# Patient Record
Sex: FEMALE | Race: WHITE | NOT HISPANIC OR LATINO
[De-identification: names, ages, dates, MRNs, and addresses within clinical notes are randomized per-mention and may not be internally consistent; named-entity substitution may affect disease eponyms.]

---

## 2018-02-02 PROBLEM — Z00.00 ENCOUNTER FOR PREVENTIVE HEALTH EXAMINATION: Status: ACTIVE | Noted: 2018-02-02

## 2018-02-16 ENCOUNTER — APPOINTMENT (OUTPATIENT)
Dept: ORTHOPEDIC SURGERY | Facility: CLINIC | Age: 78
End: 2018-02-16
Payer: MEDICARE

## 2018-02-16 VITALS
TEMPERATURE: 98.4 F | SYSTOLIC BLOOD PRESSURE: 156 MMHG | WEIGHT: 140 LBS | DIASTOLIC BLOOD PRESSURE: 77 MMHG | HEIGHT: 64 IN | BODY MASS INDEX: 23.9 KG/M2 | HEART RATE: 79 BPM

## 2018-02-16 DIAGNOSIS — M16.11 UNILATERAL PRIMARY OSTEOARTHRITIS, RIGHT HIP: ICD-10-CM

## 2018-02-16 DIAGNOSIS — Z85.3 PERSONAL HISTORY OF MALIGNANT NEOPLASM OF BREAST: ICD-10-CM

## 2018-02-16 DIAGNOSIS — Z78.9 OTHER SPECIFIED HEALTH STATUS: ICD-10-CM

## 2018-02-16 DIAGNOSIS — M87.051 IDIOPATHIC ASEPTIC NECROSIS OF RIGHT FEMUR: ICD-10-CM

## 2018-02-16 DIAGNOSIS — M25.551 PAIN IN RIGHT HIP: ICD-10-CM

## 2018-02-16 DIAGNOSIS — M16.10 UNILATERAL PRIMARY OSTEOARTHRITIS, UNSPECIFIED HIP: ICD-10-CM

## 2018-02-16 PROCEDURE — 99203 OFFICE O/P NEW LOW 30 MIN: CPT

## 2018-02-16 PROCEDURE — 73502 X-RAY EXAM HIP UNI 2-3 VIEWS: CPT | Mod: RT

## 2018-02-20 PROBLEM — M25.551 HIP PAIN, RIGHT: Status: ACTIVE | Noted: 2018-02-20

## 2018-02-20 PROBLEM — M16.11 PRIMARY OSTEOARTHRITIS OF RIGHT HIP: Status: ACTIVE | Noted: 2018-02-20

## 2018-02-20 PROBLEM — Z85.3 HISTORY OF MALIGNANT NEOPLASM OF FEMALE BREAST: Status: RESOLVED | Noted: 2018-02-16 | Resolved: 2018-02-20

## 2018-02-20 PROBLEM — M87.051 AVASCULAR NECROSIS OF BONE OF RIGHT HIP: Status: ACTIVE | Noted: 2018-02-20

## 2018-02-23 ENCOUNTER — OUTPATIENT (OUTPATIENT)
Dept: OUTPATIENT SERVICES | Facility: HOSPITAL | Age: 78
LOS: 1 days | End: 2018-02-23
Payer: COMMERCIAL

## 2018-02-23 VITALS
OXYGEN SATURATION: 99 % | RESPIRATION RATE: 16 BRPM | HEART RATE: 76 BPM | WEIGHT: 145.06 LBS | DIASTOLIC BLOOD PRESSURE: 78 MMHG | TEMPERATURE: 98 F | HEIGHT: 65 IN | SYSTOLIC BLOOD PRESSURE: 154 MMHG

## 2018-02-23 DIAGNOSIS — Z01.818 ENCOUNTER FOR OTHER PREPROCEDURAL EXAMINATION: ICD-10-CM

## 2018-02-23 DIAGNOSIS — M87.051 IDIOPATHIC ASEPTIC NECROSIS OF RIGHT FEMUR: ICD-10-CM

## 2018-02-23 DIAGNOSIS — M25.551 PAIN IN RIGHT HIP: ICD-10-CM

## 2018-02-23 DIAGNOSIS — Z98.49 CATARACT EXTRACTION STATUS, UNSPECIFIED EYE: Chronic | ICD-10-CM

## 2018-02-23 DIAGNOSIS — Z98.890 OTHER SPECIFIED POSTPROCEDURAL STATES: Chronic | ICD-10-CM

## 2018-02-23 DIAGNOSIS — M16.11 UNILATERAL PRIMARY OSTEOARTHRITIS, RIGHT HIP: ICD-10-CM

## 2018-02-23 LAB
ALBUMIN SERPL ELPH-MCNC: 4.5 G/DL — SIGNIFICANT CHANGE UP (ref 3.3–5)
ALP SERPL-CCNC: 55 U/L — SIGNIFICANT CHANGE UP (ref 30–120)
ALT FLD-CCNC: 29 U/L DA — SIGNIFICANT CHANGE UP (ref 10–60)
ANION GAP SERPL CALC-SCNC: 8 MMOL/L — SIGNIFICANT CHANGE UP (ref 5–17)
APTT BLD: 32.7 SEC — SIGNIFICANT CHANGE UP (ref 27.5–37.4)
AST SERPL-CCNC: 20 U/L — SIGNIFICANT CHANGE UP (ref 10–40)
BILIRUB SERPL-MCNC: 0.4 MG/DL — SIGNIFICANT CHANGE UP (ref 0.2–1.2)
BLD GP AB SCN SERPL QL: SIGNIFICANT CHANGE UP
BUN SERPL-MCNC: 24 MG/DL — HIGH (ref 7–23)
CALCIUM SERPL-MCNC: 11.1 MG/DL — HIGH (ref 8.4–10.5)
CHLORIDE SERPL-SCNC: 104 MMOL/L — SIGNIFICANT CHANGE UP (ref 96–108)
CO2 SERPL-SCNC: 26 MMOL/L — SIGNIFICANT CHANGE UP (ref 22–31)
CREAT SERPL-MCNC: 0.82 MG/DL — SIGNIFICANT CHANGE UP (ref 0.5–1.3)
GLUCOSE SERPL-MCNC: 111 MG/DL — HIGH (ref 70–99)
HCT VFR BLD CALC: 37.2 % — SIGNIFICANT CHANGE UP (ref 34.5–45)
HGB BLD-MCNC: 12.2 G/DL — SIGNIFICANT CHANGE UP (ref 11.5–15.5)
INR BLD: 0.99 RATIO — SIGNIFICANT CHANGE UP (ref 0.88–1.16)
MCHC RBC-ENTMCNC: 31.2 PG — SIGNIFICANT CHANGE UP (ref 27–34)
MCHC RBC-ENTMCNC: 32.7 GM/DL — SIGNIFICANT CHANGE UP (ref 32–36)
MCV RBC AUTO: 95.3 FL — SIGNIFICANT CHANGE UP (ref 80–100)
PLATELET # BLD AUTO: 425 K/UL — HIGH (ref 150–400)
POTASSIUM SERPL-MCNC: 4.5 MMOL/L — SIGNIFICANT CHANGE UP (ref 3.5–5.3)
POTASSIUM SERPL-SCNC: 4.5 MMOL/L — SIGNIFICANT CHANGE UP (ref 3.5–5.3)
PROT SERPL-MCNC: 8.4 G/DL — HIGH (ref 6–8.3)
PROTHROM AB SERPL-ACNC: 10.8 SEC — SIGNIFICANT CHANGE UP (ref 9.8–12.7)
RBC # BLD: 3.9 M/UL — SIGNIFICANT CHANGE UP (ref 3.8–5.2)
RBC # FLD: 12.4 % — SIGNIFICANT CHANGE UP (ref 10.3–14.5)
SODIUM SERPL-SCNC: 138 MMOL/L — SIGNIFICANT CHANGE UP (ref 135–145)
WBC # BLD: 9.5 K/UL — SIGNIFICANT CHANGE UP (ref 3.8–10.5)
WBC # FLD AUTO: 9.5 K/UL — SIGNIFICANT CHANGE UP (ref 3.8–10.5)

## 2018-02-23 PROCEDURE — 80053 COMPREHEN METABOLIC PANEL: CPT

## 2018-02-23 PROCEDURE — 86900 BLOOD TYPING SEROLOGIC ABO: CPT

## 2018-02-23 PROCEDURE — 36415 COLL VENOUS BLD VENIPUNCTURE: CPT

## 2018-02-23 PROCEDURE — G0463: CPT

## 2018-02-23 PROCEDURE — 87641 MR-STAPH DNA AMP PROBE: CPT

## 2018-02-23 PROCEDURE — 87640 STAPH A DNA AMP PROBE: CPT

## 2018-02-23 PROCEDURE — 85610 PROTHROMBIN TIME: CPT

## 2018-02-23 PROCEDURE — 86901 BLOOD TYPING SEROLOGIC RH(D): CPT

## 2018-02-23 PROCEDURE — 85730 THROMBOPLASTIN TIME PARTIAL: CPT

## 2018-02-23 PROCEDURE — 85027 COMPLETE CBC AUTOMATED: CPT

## 2018-02-23 PROCEDURE — 86850 RBC ANTIBODY SCREEN: CPT

## 2018-02-23 NOTE — H&P PST ADULT - PMH
Bipolar mood disorder    Breast cancer, left  treated with surgery and RT, no lymph node dissection  Esophageal reflux    Hip pain, acute, right    Hypercalcemia

## 2018-02-23 NOTE — H&P PST ADULT - HISTORY OF PRESENT ILLNESS
76 yo female presents with 5 month history of right hip pain.  Pain is constant and increases with any activity.  Ambulates with cane and is unable to ambulate without. 78 yo female presents with 5 month history of right hip pain.  Pain is constant and increases with any activity.  Ambulates with cane and is unable to ambulate without.   Pt states that Mobic and Tylenol offer mild relief.

## 2018-02-23 NOTE — H&P PST ADULT - ASSESSMENT
Pt presents to PST.  Instructions reviewed with patient and spouse.  Understanding shown by both.  Medical clearance apt is scheduled.

## 2018-02-23 NOTE — H&P PST ADULT - NSANTHOSAYNRD_GEN_A_CORE
No. GENOVEVA screening performed.  STOP BANG Legend: 0-2 = LOW Risk; 3-4 = INTERMEDIATE Risk; 5-8 = HIGH Risk

## 2018-02-24 LAB
MRSA PCR RESULT.: SIGNIFICANT CHANGE UP
S AUREUS DNA NOSE QL NAA+PROBE: SIGNIFICANT CHANGE UP

## 2018-03-01 RX ORDER — APREPITANT 80 MG/1
40 CAPSULE ORAL ONCE
Qty: 0 | Refills: 0 | Status: COMPLETED | OUTPATIENT
Start: 2018-03-07 | End: 2018-03-07

## 2018-03-01 RX ORDER — CHLORHEXIDINE GLUCONATE 213 G/1000ML
1 SOLUTION TOPICAL ONCE
Qty: 0 | Refills: 0 | Status: COMPLETED | OUTPATIENT
Start: 2018-03-07 | End: 2018-03-07

## 2018-03-01 RX ORDER — SODIUM CHLORIDE 9 MG/ML
1000 INJECTION, SOLUTION INTRAVENOUS
Qty: 0 | Refills: 0 | Status: DISCONTINUED | OUTPATIENT
Start: 2018-03-07 | End: 2018-03-09

## 2018-03-06 RX ORDER — DOCUSATE SODIUM 100 MG
100 CAPSULE ORAL THREE TIMES A DAY
Qty: 0 | Refills: 0 | Status: DISCONTINUED | OUTPATIENT
Start: 2018-03-07 | End: 2018-03-09

## 2018-03-06 RX ORDER — MAGNESIUM HYDROXIDE 400 MG/1
30 TABLET, CHEWABLE ORAL DAILY
Qty: 0 | Refills: 0 | Status: DISCONTINUED | OUTPATIENT
Start: 2018-03-07 | End: 2018-03-09

## 2018-03-06 RX ORDER — SENNA PLUS 8.6 MG/1
2 TABLET ORAL AT BEDTIME
Qty: 0 | Refills: 0 | Status: DISCONTINUED | OUTPATIENT
Start: 2018-03-07 | End: 2018-03-09

## 2018-03-06 RX ORDER — PANTOPRAZOLE SODIUM 20 MG/1
40 TABLET, DELAYED RELEASE ORAL DAILY
Qty: 0 | Refills: 0 | Status: DISCONTINUED | OUTPATIENT
Start: 2018-03-07 | End: 2018-03-09

## 2018-03-06 RX ORDER — ONDANSETRON 8 MG/1
4 TABLET, FILM COATED ORAL EVERY 6 HOURS
Qty: 0 | Refills: 0 | Status: DISCONTINUED | OUTPATIENT
Start: 2018-03-07 | End: 2018-03-09

## 2018-03-06 RX ORDER — POLYETHYLENE GLYCOL 3350 17 G/17G
17 POWDER, FOR SOLUTION ORAL DAILY
Qty: 0 | Refills: 0 | Status: DISCONTINUED | OUTPATIENT
Start: 2018-03-07 | End: 2018-03-09

## 2018-03-06 RX ORDER — SODIUM CHLORIDE 9 MG/ML
1000 INJECTION, SOLUTION INTRAVENOUS
Qty: 0 | Refills: 0 | Status: DISCONTINUED | OUTPATIENT
Start: 2018-03-07 | End: 2018-03-09

## 2018-03-07 ENCOUNTER — RESULT REVIEW (OUTPATIENT)
Age: 78
End: 2018-03-07

## 2018-03-07 ENCOUNTER — INPATIENT (INPATIENT)
Facility: HOSPITAL | Age: 78
LOS: 1 days | Discharge: NURSING, REHAB OR LONG-TERM CARE | DRG: 470 | End: 2018-03-09
Attending: ORTHOPAEDIC SURGERY | Admitting: ORTHOPAEDIC SURGERY
Payer: COMMERCIAL

## 2018-03-07 ENCOUNTER — TRANSCRIPTION ENCOUNTER (OUTPATIENT)
Age: 78
End: 2018-03-07

## 2018-03-07 ENCOUNTER — APPOINTMENT (OUTPATIENT)
Dept: ORTHOPEDIC SURGERY | Facility: HOSPITAL | Age: 78
End: 2018-03-07

## 2018-03-07 VITALS
HEART RATE: 75 BPM | DIASTOLIC BLOOD PRESSURE: 74 MMHG | HEIGHT: 65 IN | RESPIRATION RATE: 18 BRPM | TEMPERATURE: 99 F | SYSTOLIC BLOOD PRESSURE: 134 MMHG | OXYGEN SATURATION: 100 % | WEIGHT: 128.75 LBS

## 2018-03-07 DIAGNOSIS — M87.051 IDIOPATHIC ASEPTIC NECROSIS OF RIGHT FEMUR: ICD-10-CM

## 2018-03-07 DIAGNOSIS — Z98.49 CATARACT EXTRACTION STATUS, UNSPECIFIED EYE: Chronic | ICD-10-CM

## 2018-03-07 DIAGNOSIS — M16.11 UNILATERAL PRIMARY OSTEOARTHRITIS, RIGHT HIP: ICD-10-CM

## 2018-03-07 DIAGNOSIS — Z98.890 OTHER SPECIFIED POSTPROCEDURAL STATES: Chronic | ICD-10-CM

## 2018-03-07 DIAGNOSIS — M25.551 PAIN IN RIGHT HIP: ICD-10-CM

## 2018-03-07 DIAGNOSIS — Z01.818 ENCOUNTER FOR OTHER PREPROCEDURAL EXAMINATION: ICD-10-CM

## 2018-03-07 LAB
ANION GAP SERPL CALC-SCNC: 6 MMOL/L — SIGNIFICANT CHANGE UP (ref 5–17)
BUN SERPL-MCNC: 16 MG/DL — SIGNIFICANT CHANGE UP (ref 7–23)
CALCIUM SERPL-MCNC: 9.7 MG/DL — SIGNIFICANT CHANGE UP (ref 8.4–10.5)
CHLORIDE SERPL-SCNC: 107 MMOL/L — SIGNIFICANT CHANGE UP (ref 96–108)
CO2 SERPL-SCNC: 26 MMOL/L — SIGNIFICANT CHANGE UP (ref 22–31)
CREAT SERPL-MCNC: 0.78 MG/DL — SIGNIFICANT CHANGE UP (ref 0.5–1.3)
GLUCOSE SERPL-MCNC: 197 MG/DL — HIGH (ref 70–99)
HCT VFR BLD CALC: 28.2 % — LOW (ref 34.5–45)
HGB BLD-MCNC: 9 G/DL — LOW (ref 11.5–15.5)
MCHC RBC-ENTMCNC: 31.9 PG — SIGNIFICANT CHANGE UP (ref 27–34)
MCHC RBC-ENTMCNC: 32 GM/DL — SIGNIFICANT CHANGE UP (ref 32–36)
MCV RBC AUTO: 99.8 FL — SIGNIFICANT CHANGE UP (ref 80–100)
PLATELET # BLD AUTO: 326 K/UL — SIGNIFICANT CHANGE UP (ref 150–400)
POTASSIUM SERPL-MCNC: 4 MMOL/L — SIGNIFICANT CHANGE UP (ref 3.5–5.3)
POTASSIUM SERPL-SCNC: 4 MMOL/L — SIGNIFICANT CHANGE UP (ref 3.5–5.3)
RBC # BLD: 2.82 M/UL — LOW (ref 3.8–5.2)
RBC # FLD: 12.3 % — SIGNIFICANT CHANGE UP (ref 10.3–14.5)
SODIUM SERPL-SCNC: 139 MMOL/L — SIGNIFICANT CHANGE UP (ref 135–145)
WBC # BLD: 13.4 K/UL — HIGH (ref 3.8–10.5)
WBC # FLD AUTO: 13.4 K/UL — HIGH (ref 3.8–10.5)

## 2018-03-07 PROCEDURE — 88305 TISSUE EXAM BY PATHOLOGIST: CPT | Mod: 26

## 2018-03-07 PROCEDURE — 88311 DECALCIFY TISSUE: CPT | Mod: 26

## 2018-03-07 PROCEDURE — 99223 1ST HOSP IP/OBS HIGH 75: CPT

## 2018-03-07 PROCEDURE — 27130 TOTAL HIP ARTHROPLASTY: CPT | Mod: RT

## 2018-03-07 RX ORDER — HYDROMORPHONE HYDROCHLORIDE 2 MG/ML
0.5 INJECTION INTRAMUSCULAR; INTRAVENOUS; SUBCUTANEOUS
Qty: 0 | Refills: 0 | Status: DISCONTINUED | OUTPATIENT
Start: 2018-03-07 | End: 2018-03-07

## 2018-03-07 RX ORDER — OMEPRAZOLE 10 MG/1
1 CAPSULE, DELAYED RELEASE ORAL
Qty: 0 | Refills: 0 | COMMUNITY

## 2018-03-07 RX ORDER — DOCUSATE SODIUM 100 MG
1 CAPSULE ORAL
Qty: 0 | Refills: 0 | DISCHARGE
Start: 2018-03-07

## 2018-03-07 RX ORDER — POLYETHYLENE GLYCOL 3350 17 G/17G
17 POWDER, FOR SOLUTION ORAL
Qty: 0 | Refills: 0 | DISCHARGE
Start: 2018-03-07

## 2018-03-07 RX ORDER — OXYCODONE HYDROCHLORIDE 5 MG/1
5 TABLET ORAL
Qty: 0 | Refills: 0 | Status: DISCONTINUED | OUTPATIENT
Start: 2018-03-07 | End: 2018-03-09

## 2018-03-07 RX ORDER — ASPIRIN/CALCIUM CARB/MAGNESIUM 324 MG
2 TABLET ORAL
Qty: 0 | Refills: 0 | COMMUNITY
Start: 2018-03-07

## 2018-03-07 RX ORDER — SENNA PLUS 8.6 MG/1
2 TABLET ORAL
Qty: 0 | Refills: 0 | DISCHARGE
Start: 2018-03-07

## 2018-03-07 RX ORDER — PANTOPRAZOLE SODIUM 20 MG/1
1 TABLET, DELAYED RELEASE ORAL
Qty: 0 | Refills: 0 | DISCHARGE
Start: 2018-03-07

## 2018-03-07 RX ORDER — ACETAMINOPHEN 500 MG
2 TABLET ORAL
Qty: 0 | Refills: 0 | DISCHARGE
Start: 2018-03-07

## 2018-03-07 RX ORDER — ACETAMINOPHEN 500 MG
1000 TABLET ORAL EVERY 8 HOURS
Qty: 0 | Refills: 0 | Status: DISCONTINUED | OUTPATIENT
Start: 2018-03-08 | End: 2018-03-09

## 2018-03-07 RX ORDER — MORPHINE SULFATE 50 MG/1
5 CAPSULE, EXTENDED RELEASE ORAL EVERY 4 HOURS
Qty: 0 | Refills: 0 | Status: DISCONTINUED | OUTPATIENT
Start: 2018-03-07 | End: 2018-03-09

## 2018-03-07 RX ORDER — OXYCODONE HYDROCHLORIDE 5 MG/1
10 TABLET ORAL
Qty: 0 | Refills: 0 | Status: DISCONTINUED | OUTPATIENT
Start: 2018-03-07 | End: 2018-03-09

## 2018-03-07 RX ORDER — CELECOXIB 200 MG/1
1 CAPSULE ORAL
Qty: 0 | Refills: 0 | DISCHARGE
Start: 2018-03-07

## 2018-03-07 RX ORDER — ASPIRIN/CALCIUM CARB/MAGNESIUM 324 MG
162 TABLET ORAL
Qty: 0 | Refills: 0 | Status: DISCONTINUED | OUTPATIENT
Start: 2018-03-08 | End: 2018-03-09

## 2018-03-07 RX ORDER — ASPIRIN/CALCIUM CARB/MAGNESIUM 324 MG
2 TABLET ORAL
Qty: 0 | Refills: 0 | DISCHARGE
Start: 2018-03-07

## 2018-03-07 RX ORDER — CELECOXIB 200 MG/1
1 CAPSULE ORAL
Qty: 0 | Refills: 0 | COMMUNITY
Start: 2018-03-07

## 2018-03-07 RX ORDER — SODIUM CHLORIDE 9 MG/ML
1000 INJECTION, SOLUTION INTRAVENOUS
Qty: 0 | Refills: 0 | Status: DISCONTINUED | OUTPATIENT
Start: 2018-03-07 | End: 2018-03-07

## 2018-03-07 RX ORDER — TRANEXAMIC ACID 100 MG/ML
1000 INJECTION, SOLUTION INTRAVENOUS ONCE
Qty: 0 | Refills: 0 | Status: COMPLETED | OUTPATIENT
Start: 2018-03-07 | End: 2018-03-07

## 2018-03-07 RX ORDER — ACETAMINOPHEN 500 MG
2 TABLET ORAL
Qty: 0 | Refills: 0 | COMMUNITY

## 2018-03-07 RX ORDER — MELOXICAM 15 MG/1
1 TABLET ORAL
Qty: 0 | Refills: 0 | COMMUNITY

## 2018-03-07 RX ORDER — ACETAMINOPHEN 500 MG
1000 TABLET ORAL ONCE
Qty: 0 | Refills: 0 | Status: COMPLETED | OUTPATIENT
Start: 2018-03-07 | End: 2018-03-07

## 2018-03-07 RX ORDER — ACETAMINOPHEN 500 MG
1000 TABLET ORAL EVERY 6 HOURS
Qty: 0 | Refills: 0 | Status: COMPLETED | OUTPATIENT
Start: 2018-03-07 | End: 2018-03-08

## 2018-03-07 RX ORDER — CEFAZOLIN SODIUM 1 G
2000 VIAL (EA) INJECTION ONCE
Qty: 0 | Refills: 0 | Status: COMPLETED | OUTPATIENT
Start: 2018-03-07 | End: 2018-03-07

## 2018-03-07 RX ORDER — CINACALCET 30 MG/1
30 TABLET, FILM COATED ORAL
Qty: 0 | Refills: 0 | Status: DISCONTINUED | OUTPATIENT
Start: 2018-03-07 | End: 2018-03-09

## 2018-03-07 RX ORDER — LITHIUM CARBONATE 300 MG/1
300 TABLET, EXTENDED RELEASE ORAL DAILY
Qty: 0 | Refills: 0 | Status: DISCONTINUED | OUTPATIENT
Start: 2018-03-07 | End: 2018-03-09

## 2018-03-07 RX ORDER — CELECOXIB 200 MG/1
200 CAPSULE ORAL
Qty: 0 | Refills: 0 | Status: DISCONTINUED | OUTPATIENT
Start: 2018-03-07 | End: 2018-03-09

## 2018-03-07 RX ORDER — CEFAZOLIN SODIUM 1 G
2000 VIAL (EA) INJECTION EVERY 8 HOURS
Qty: 0 | Refills: 0 | Status: COMPLETED | OUTPATIENT
Start: 2018-03-07 | End: 2018-03-08

## 2018-03-07 RX ADMIN — HYDROMORPHONE HYDROCHLORIDE 0.5 MILLIGRAM(S): 2 INJECTION INTRAMUSCULAR; INTRAVENOUS; SUBCUTANEOUS at 12:54

## 2018-03-07 RX ADMIN — Medication 400 MILLIGRAM(S): at 17:27

## 2018-03-07 RX ADMIN — Medication 100 MILLIGRAM(S): at 17:28

## 2018-03-07 RX ADMIN — Medication 1000 MILLIGRAM(S): at 21:44

## 2018-03-07 RX ADMIN — CELECOXIB 200 MILLIGRAM(S): 200 CAPSULE ORAL at 17:31

## 2018-03-07 RX ADMIN — SODIUM CHLORIDE 75 MILLILITER(S): 9 INJECTION, SOLUTION INTRAVENOUS at 11:49

## 2018-03-07 RX ADMIN — SODIUM CHLORIDE 75 MILLILITER(S): 9 INJECTION, SOLUTION INTRAVENOUS at 11:51

## 2018-03-07 RX ADMIN — Medication 400 MILLIGRAM(S): at 21:13

## 2018-03-07 RX ADMIN — APREPITANT 40 MILLIGRAM(S): 80 CAPSULE ORAL at 07:36

## 2018-03-07 RX ADMIN — HYDROMORPHONE HYDROCHLORIDE 0.5 MILLIGRAM(S): 2 INJECTION INTRAMUSCULAR; INTRAVENOUS; SUBCUTANEOUS at 12:24

## 2018-03-07 RX ADMIN — CHLORHEXIDINE GLUCONATE 1 APPLICATION(S): 213 SOLUTION TOPICAL at 07:36

## 2018-03-07 NOTE — DISCHARGE NOTE ADULT - PATIENT PORTAL LINK FT
You can access the [facility] Patient Portal, offered by [facility], by registering with the following website: [email or web address]

## 2018-03-07 NOTE — DISCHARGE NOTE ADULT - PROVIDER TOKENS
FREE:[LAST:[Ed],FIRST:[Ciaran],PHONE:[phone]],FAX:[phone]   [phone]    -],ADDRESS:[[address] [place] [address]]]

## 2018-03-07 NOTE — CONSULT NOTE ADULT - ASSESSMENT
76 y/o female PMH of left breast cancer, GERD, Bipolar disorder, who  presented  with 5 month history of right hip pain, s/p Right total hip replacement surgery.     - S/p Right Total Hips replacement Surgery: POD # 0. Pain management per anesthesia, ortho. PT/ OT once cleared by ortho. Incentive spirometry. DVT PPX. Stool softeners while on narcotics.  - Bipolar disorder: Continue home lithium dose and frequency.  - GERD: Protnix daily  - GI/ DVt PPX  - CBC, BMP for am.   - Medically stable.

## 2018-03-07 NOTE — DISCHARGE NOTE ADULT - CARE PLAN
Principal Discharge DX:	Primary osteoarthritis of right hip  Goal:	Improve ambulation, ADLs and quality of life  Assessment and plan of treatment:	PT/OT Total Hip Protocol; Ambulation, transfers, stairs, & ADLs  Full weight bearing both legs; Walker/cane use as instructed by PT/OT  Anterior THR precautions for 4 weeks: No straight leg raise; No external rotation of hip when extended-standing or lying flat; No hyperextension of hip when standing (kickback)  Ice packs to hip  Prineo tape removal on/near after Post Op Day # 14 in rehab facility / Surgeon's office.  Heterotopic Bone Protocol post THR: Celebrex 200mg bid for 21 days; If stopped mid course for intolerance, contact Surgeon & Antonio BULL to notify.  Instruct patient to see PCP in office near 2-3 weeks from discharge from rehab for exam/labwork.  DVT: prophylaxis - Aspirin 162 mg 2 times daily for a total of 6 weeks.  - Call your doctor if you experience:  • An increase in pain not controlled by pain medication or change in activity or position.  • Temperature greater than 101° F.  • Redness, increased swelling or foul smelling drainage from or around the incision.  • Numbness, tingling or a change in color or temperature of the operative leg.  • Call your doctor immediately if you experience chest pain, shortness of breath or calf pain.

## 2018-03-07 NOTE — DISCHARGE NOTE ADULT - MEDICATION SUMMARY - MEDICATIONS TO TAKE
I will START or STAY ON the medications listed below when I get home from the hospital:    acetaminophen 500 mg oral tablet  -- 2 tab(s) by mouth every 8 hours  -- Indication: For mild pain    celecoxib 200 mg oral capsule  -- 1 cap(s) by mouth 2 times a day (with meals) for 21 days post-operatively for HO prophylaxis stop on 3/28  -- Indication: For Prevention of bony overgrowth    aspirin 81 mg oral delayed release tablet  -- 2 tab(s) by mouth for 42 days total post-operatively stop on 4/17  -- Indication: For Prevent blood clots    oxyCODONE 5 mg oral tablet  -- 1 tab(s) by mouth every 3 hours, As needed, moderate pain  -- Indication: For moderate pain    lithium 300 mg oral tablet  -- 1 tab(s) by mouth once a day  -- Indication: For mood    Sensipar 30 mg oral tablet  -- 1 tab(s) by mouth 2 times a day  -- Indication: For hormone    ferrous sulfate 325 mg (65 mg elemental iron) oral tablet  -- 1 tab(s) by mouth 2 times a day  -- Indication: For supplement    senna oral tablet  -- 2 tab(s) by mouth once a day (at bedtime)  -- Indication: For constipation as needed    docusate sodium 100 mg oral capsule  -- 1 cap(s) by mouth 3 times a day  -- Indication: For constipation as needed    polyethylene glycol 3350 oral powder for reconstitution  -- 17 gram(s) by mouth once a day, As needed, Constipation  -- Indication: For constipation as needed    pantoprazole 40 mg oral delayed release tablet  -- 1 tab(s) by mouth once a day  -- Indication: For Prevent ulcers I will START or STAY ON the medications listed below when I get home from the hospital:    acetaminophen 500 mg oral tablet  -- 2 tab(s) by mouth every 8 hours  -- Indication: For mild pain    celecoxib 200 mg oral capsule  -- 1 cap(s) by mouth 2 times a day (with meals) for 21 days post-operatively for HO prophylaxis stop on 3/28  -- Indication: For Prevention of bony overgrowth    aspirin 81 mg oral delayed release tablet  -- 2 tab(s) by mouth for 42 days total post-operatively stop on 4/17  -- Indication: For Prevent blood clots    oxyCODONE 5 mg oral tablet  -- 1 tab(s) by mouth every 4 hours, As Needed for mild hip pain. 2 tabs for severe pain  -- Indication: For Surgical pain    lithium 300 mg oral tablet  -- 1 tab(s) by mouth once a day  -- Indication: For mood    Sensipar 30 mg oral tablet  -- 1 tab(s) by mouth 2 times a day  -- Indication: For hormone    ferrous sulfate 325 mg (65 mg elemental iron) oral tablet  -- 1 tab(s) by mouth 2 times a day  -- Indication: For supplement    senna oral tablet  -- 2 tab(s) by mouth once a day (at bedtime)  -- Indication: For constipation as needed    docusate sodium 100 mg oral capsule  -- 1 cap(s) by mouth 3 times a day  -- Indication: For constipation as needed    polyethylene glycol 3350 oral powder for reconstitution  -- 17 gram(s) by mouth once a day, As needed, Constipation  -- Indication: For constipation as needed    pantoprazole 40 mg oral delayed release tablet  -- 1 tab(s) by mouth once a day  -- Indication: For Prevent ulcers

## 2018-03-07 NOTE — OCCUPATIONAL THERAPY INITIAL EVALUATION ADULT - PRECAUTIONS/LIMITATIONS, REHAB EVAL
No hyperextension, no extreme external rotation, no extreme abduction of operated LE./fall precautions/surgical precautions

## 2018-03-07 NOTE — OCCUPATIONAL THERAPY INITIAL EVALUATION ADULT - ADDITIONAL COMMENTS
Pt lives in  a house with 3 ALISHA + railing + stairlift to bedroom/bathroom +tub with shower chair. PTA pt used a SAC

## 2018-03-07 NOTE — CONSULT NOTE ADULT - SUBJECTIVE AND OBJECTIVE BOX
Patient is a 77y old  Female who presents with a chief complaint of " I have pain in my right hip." (06 Mar 2018 15:50)       INTERVAL HPI/ OVERNIGHT EVENTS:78 y/o female PMH of left breast cancer, GERD, Bipolar disorder, who  presented  with 5 month history of right hip pain.  Pain has been  constant and increases with any activity.  Ambulates with cane and is unable to ambulate without.   Pt states that Mobic and Tylenol offer mild relief. 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 Seen and examined Post operatively. Denies any symptoms, but wants to take her lithium.     PMH: As per HPI  PSH: Cataract removal surgery, Lumpectomy   FH: No significant family  history.  SH: Denies Tobacco, alcohol, drug use.          MEDICATIONS  (STANDING):  ceFAZolin   IVPB 2000 milliGRAM(s) IV Intermittent every 8 hours  celecoxib 200 milliGRAM(s) Oral two times a day with meals  cinacalcet 30 milliGRAM(s) Oral two times a day  lactated ringers. 1000 milliLiter(s) (75 mL/Hr) IV Continuous <Continuous>  lactated ringers. 1000 milliLiter(s) (75 mL/Hr) IV Continuous <Continuous>  lithium 300 milliGRAM(s) Oral daily    MEDICATIONS  (PRN):  HYDROmorphone  Injectable 0.5 milliGRAM(s) IV Push every 10 minutes PRN Moderate Pain  morphine  - Injectable 5 milliGRAM(s) IV Push every 4 hours PRN Severe Pain (7 - 10)  oxyCODONE    IR 5 milliGRAM(s) Oral every 3 hours PRN Mild Pain (1 - 3)  oxyCODONE    IR 10 milliGRAM(s) Oral every 3 hours PRN Moderate Pain (4 - 6)  promethazine IVPB 6.25 milliGRAM(s) IV Intermittent once PRN Nausea and/or Vomiting      Allergies    No Known Allergies    Intolerances        REVIEW OF SYSTEMS:  CONSTITUTIONAL: No fever, weight loss, or fatigue  EYES: No eye pain, visual disturbances, or discharge  ENMT:  No difficulty hearing, tinnitus, vertigo; No sinus or throat pain  NECK: No pain or stiffness  BREASTS: No pain, masses, or nipple discharge  RESPIRATORY: No cough, wheezing, chills or hemoptysis; No shortness of breath  CARDIOVASCULAR: No chest pain, palpitations, dizziness, or leg swelling  GASTROINTESTINAL: No abdominal or epigastric pain. No nausea, vomiting, or hematemesis; No diarrhea or constipation. No melena or hematochezia.  GENITOURINARY: No dysuria, frequency, hematuria, or incontinence  NEUROLOGICAL: No headaches, memory loss, loss of strength, numbness, or tremors  SKIN: No itching, burning, rashes, or lesions   LYMPH NODES: No enlarged glands  ENDOCRINE: No heat or cold intolerance; No hair loss; No polydipsia or polyuria  MUSCULOSKELETAL: No joint pain or swelling; No muscle, back, or extremity pain  PSYCHIATRIC: No depression, anxiety, mood swings, or difficulty sleeping  HEME/LYMPH: No easy bruising, or bleeding gums  ALLERGY AND IMMUNOLOGIC: No hives or eczema    Vital Signs Last 24 Hrs  T(C): 36.6 (07 Mar 2018 11:26), Max: 37.1 (07 Mar 2018 06:51)  T(F): 97.8 (07 Mar 2018 11:26), Max: 98.7 (07 Mar 2018 06:51)  HR: 76 (07 Mar 2018 11:41) (75 - 78)  BP: 100/64 (07 Mar 2018 11:41) (100/64 - 134/74)  BP(mean): --  RR: 19 (07 Mar 2018 11:41) (14 - 22)  SpO2: 100% (07 Mar 2018 11:41) (100% - 100%)    PHYSICAL EXAM:  GENERAL: NAD, well-groomed, well-developed  HEAD:  Atraumatic, Normocephalic  EYES: EOMI, PERRLA, conjunctiva and sclera clear  ENMT: No tonsillar erythema, exudates, or enlargement; Moist mucous membranes, Good dentition, No lesions  NECK: Supple, No JVD, Normal thyroid  NERVOUS SYSTEM:  Alert & Oriented X3, Good concentration; Motor Strength 5/5 B/L upper and lower extremities; DTRs 2+ intact and symmetric  CHEST/LUNG: Clear to auscultation bilaterally; No rales, rhonchi, wheezing, or rubs  HEART: Regular rate and rhythm; No murmurs, rubs, or gallops  ABDOMEN: Soft, Nontender, Nondistended; Bowel sounds present  EXTREMITIES:  2+ Peripheral Pulses, No clubbing, cyanosis, or edema  LYMPH: No lymphadenopathy noted  SKIN: No rashes or lesions    LABS:            CAPILLARY BLOOD GLUCOSE        BLOOD CULTURE    RADIOLOGY & ADDITIONAL TESTS:    Imaging Personally Reviewed:  [ ] YES     Consultant(s) Notes Reviewed:      Care Discussed with Consultants/Other Providers:

## 2018-03-07 NOTE — DISCHARGE NOTE ADULT - HOSPITAL COURSE
This patient was admitted to [facility] with a history of severe degenerative joint disease of the right hip.  Patient went to Pre-Surgical Testing at [facility] and was medically cleared to undergo elective procedure. Patient underwent right Ant THR by Dr. Ciaran Ward on 3/7/18. Procedure was well tolerated.  No operative or stan-operative complications arose during patients hospital course.  Patient received antibiotic according to SCIP guidelines for infection prevention.  Aspirin was given for DVT prophylaxis.  Anesthesia, Medical Hospitalist, Physical Therapy and Occupational Therapy were consulted. Patient is stable for discharge with a good prognosis.  Appropriate discharge instructions and medications are provided in this document.

## 2018-03-07 NOTE — DISCHARGE NOTE ADULT - MEDICATION SUMMARY - MEDICATIONS TO STOP TAKING
I will STOP taking the medications listed below when I get home from the hospital:    omeprazole 20 mg oral delayed release tablet  -- 1 tab(s) by mouth 2 times a day    Tylenol 325 mg oral tablet  -- 2 tab(s) by mouth prn

## 2018-03-07 NOTE — DISCHARGE NOTE ADULT - PLAN OF CARE
PT/OT Total Hip Protocol; Ambulation, transfers, stairs, & ADLs  Full weight bearing both legs; Walker/cane use as instructed by PT/OT  Anterior THR precautions for 4 weeks: No straight leg raise; No external rotation of hip when extended-standing or lying flat; No hyperextension of hip when standing (kickback)  Ice packs to hip  Prineo tape removal on/near after Post Op Day # 14 in rehab facility / Surgeon's office.  Heterotopic Bone Protocol post THR: Celebrex 200mg bid for 21 days; If stopped mid course for intolerance, contact Surgeon & House MD to notify.  Instruct patient to see PCP in office near 2-3 weeks from discharge from rehab for exam/labwork.  DVT: prophylaxis - Aspirin 162 mg 2 times daily for a total of 6 weeks.  - Call your doctor if you experience:  • An increase in pain not controlled by pain medication or change in activity or position.  • Temperature greater than 101° F.  • Redness, increased swelling or foul smelling drainage from or around the incision.  • Numbness, tingling or a change in color or temperature of the operative leg.  • Call your doctor immediately if you experience chest pain, shortness of breath or calf pain. Improve ambulation, ADLs and quality of life

## 2018-03-07 NOTE — OCCUPATIONAL THERAPY INITIAL EVALUATION ADULT - NS ASR FOLLOW COMMAND OT EVAL
No hyperextension, no extreme external rotation, no extreme abduction of operated LE./100% of the time

## 2018-03-07 NOTE — PHYSICAL THERAPY INITIAL EVALUATION ADULT - ACTIVE RANGE OF MOTION EXAMINATION, REHAB EVAL
antolin. upper extremity Active ROM was WNL (within normal limits)/bilateral lower extremity Active ROM was WNL (within normal limits)

## 2018-03-07 NOTE — PHYSICAL THERAPY INITIAL EVALUATION ADULT - ADDITIONAL COMMENTS
Pt lives with  in a house with 4 ALISHA, +HRs. There is a stairlift present to get to and from second floor. Pt reports using a standard cane prior to surgery and has a rolling walker as well.

## 2018-03-08 LAB
ANION GAP SERPL CALC-SCNC: 6 MMOL/L — SIGNIFICANT CHANGE UP (ref 5–17)
BUN SERPL-MCNC: 11 MG/DL — SIGNIFICANT CHANGE UP (ref 7–23)
CALCIUM SERPL-MCNC: 9.6 MG/DL — SIGNIFICANT CHANGE UP (ref 8.4–10.5)
CHLORIDE SERPL-SCNC: 107 MMOL/L — SIGNIFICANT CHANGE UP (ref 96–108)
CO2 SERPL-SCNC: 29 MMOL/L — SIGNIFICANT CHANGE UP (ref 22–31)
CREAT SERPL-MCNC: 0.64 MG/DL — SIGNIFICANT CHANGE UP (ref 0.5–1.3)
GLUCOSE SERPL-MCNC: 98 MG/DL — SIGNIFICANT CHANGE UP (ref 70–99)
HBA1C BLD-MCNC: 5 % — SIGNIFICANT CHANGE UP (ref 4–5.6)
HCT VFR BLD CALC: 30.2 % — LOW (ref 34.5–45)
HGB BLD-MCNC: 9.9 G/DL — LOW (ref 11.5–15.5)
MCHC RBC-ENTMCNC: 32.6 PG — SIGNIFICANT CHANGE UP (ref 27–34)
MCHC RBC-ENTMCNC: 32.8 GM/DL — SIGNIFICANT CHANGE UP (ref 32–36)
MCV RBC AUTO: 99.4 FL — SIGNIFICANT CHANGE UP (ref 80–100)
PLATELET # BLD AUTO: 309 K/UL — SIGNIFICANT CHANGE UP (ref 150–400)
POTASSIUM SERPL-MCNC: 4.3 MMOL/L — SIGNIFICANT CHANGE UP (ref 3.5–5.3)
POTASSIUM SERPL-SCNC: 4.3 MMOL/L — SIGNIFICANT CHANGE UP (ref 3.5–5.3)
RBC # BLD: 3.03 M/UL — LOW (ref 3.8–5.2)
RBC # FLD: 12.3 % — SIGNIFICANT CHANGE UP (ref 10.3–14.5)
SODIUM SERPL-SCNC: 142 MMOL/L — SIGNIFICANT CHANGE UP (ref 135–145)
WBC # BLD: 10.1 K/UL — SIGNIFICANT CHANGE UP (ref 3.8–10.5)
WBC # FLD AUTO: 10.1 K/UL — SIGNIFICANT CHANGE UP (ref 3.8–10.5)

## 2018-03-08 PROCEDURE — 99233 SBSQ HOSP IP/OBS HIGH 50: CPT

## 2018-03-08 RX ADMIN — Medication 162 MILLIGRAM(S): at 21:35

## 2018-03-08 RX ADMIN — CELECOXIB 200 MILLIGRAM(S): 200 CAPSULE ORAL at 17:11

## 2018-03-08 RX ADMIN — PANTOPRAZOLE SODIUM 40 MILLIGRAM(S): 20 TABLET, DELAYED RELEASE ORAL at 12:50

## 2018-03-08 RX ADMIN — Medication 162 MILLIGRAM(S): at 10:04

## 2018-03-08 RX ADMIN — CINACALCET 30 MILLIGRAM(S): 30 TABLET, FILM COATED ORAL at 08:21

## 2018-03-08 RX ADMIN — Medication 1000 MILLIGRAM(S): at 04:43

## 2018-03-08 RX ADMIN — Medication 1000 MILLIGRAM(S): at 17:11

## 2018-03-08 RX ADMIN — CELECOXIB 200 MILLIGRAM(S): 200 CAPSULE ORAL at 08:21

## 2018-03-08 RX ADMIN — Medication 100 MILLIGRAM(S): at 01:30

## 2018-03-08 RX ADMIN — LITHIUM CARBONATE 300 MILLIGRAM(S): 300 TABLET, EXTENDED RELEASE ORAL at 08:21

## 2018-03-08 RX ADMIN — Medication 100 MILLIGRAM(S): at 12:50

## 2018-03-08 RX ADMIN — CELECOXIB 200 MILLIGRAM(S): 200 CAPSULE ORAL at 09:00

## 2018-03-08 RX ADMIN — Medication 1000 MILLIGRAM(S): at 10:04

## 2018-03-08 RX ADMIN — Medication 400 MILLIGRAM(S): at 04:24

## 2018-03-08 RX ADMIN — Medication 1000 MILLIGRAM(S): at 11:00

## 2018-03-08 RX ADMIN — CINACALCET 30 MILLIGRAM(S): 30 TABLET, FILM COATED ORAL at 17:11

## 2018-03-08 NOTE — PROGRESS NOTE ADULT - ASSESSMENT
78 y/o female PMH of left breast cancer, GERD, Bipolar disorder, who  presented  with 5 month history of right hip pain, s/p Right total hip replacement surgery.     - S/p Right Total Hips replacement Surgery: POD # 1. Pain management per anesthesia, ortho. PT/ OT once cleared by ortho. Incentive spirometry. DVT PPX. Stool softeners while on narcotics.  - Leukocytosis, likely reactive. Monitor counts for now. Afebrile, asymptomatic.  - Hyperglycemia:  No h/o DM. Check HbA1c.   - Bipolar disorder: Continue home lithium dose and frequency.  - GERD: Protonix daily  - GI/ DVt PPX: Protonix , Aspirin   - CBC, BMP for am.   - Medically stable.

## 2018-03-09 VITALS
OXYGEN SATURATION: 98 % | TEMPERATURE: 98 F | RESPIRATION RATE: 16 BRPM | DIASTOLIC BLOOD PRESSURE: 62 MMHG | HEART RATE: 72 BPM | SYSTOLIC BLOOD PRESSURE: 98 MMHG

## 2018-03-09 LAB
ANION GAP SERPL CALC-SCNC: 6 MMOL/L — SIGNIFICANT CHANGE UP (ref 5–17)
BUN SERPL-MCNC: 15 MG/DL — SIGNIFICANT CHANGE UP (ref 7–23)
CALCIUM SERPL-MCNC: 9.6 MG/DL — SIGNIFICANT CHANGE UP (ref 8.4–10.5)
CHLORIDE SERPL-SCNC: 109 MMOL/L — HIGH (ref 96–108)
CO2 SERPL-SCNC: 26 MMOL/L — SIGNIFICANT CHANGE UP (ref 22–31)
CREAT SERPL-MCNC: 0.64 MG/DL — SIGNIFICANT CHANGE UP (ref 0.5–1.3)
GLUCOSE SERPL-MCNC: 101 MG/DL — HIGH (ref 70–99)
HCT VFR BLD CALC: 27.9 % — LOW (ref 34.5–45)
HGB BLD-MCNC: 9.1 G/DL — LOW (ref 11.5–15.5)
MCHC RBC-ENTMCNC: 32.5 GM/DL — SIGNIFICANT CHANGE UP (ref 32–36)
MCHC RBC-ENTMCNC: 32.5 PG — SIGNIFICANT CHANGE UP (ref 27–34)
MCV RBC AUTO: 100.1 FL — HIGH (ref 80–100)
PLATELET # BLD AUTO: 299 K/UL — SIGNIFICANT CHANGE UP (ref 150–400)
POTASSIUM SERPL-MCNC: 4.3 MMOL/L — SIGNIFICANT CHANGE UP (ref 3.5–5.3)
POTASSIUM SERPL-SCNC: 4.3 MMOL/L — SIGNIFICANT CHANGE UP (ref 3.5–5.3)
RBC # BLD: 2.79 M/UL — LOW (ref 3.8–5.2)
RBC # FLD: 12.8 % — SIGNIFICANT CHANGE UP (ref 10.3–14.5)
SODIUM SERPL-SCNC: 141 MMOL/L — SIGNIFICANT CHANGE UP (ref 135–145)
WBC # BLD: 10.9 K/UL — HIGH (ref 3.8–10.5)
WBC # FLD AUTO: 10.9 K/UL — HIGH (ref 3.8–10.5)

## 2018-03-09 PROCEDURE — 94664 DEMO&/EVAL PT USE INHALER: CPT

## 2018-03-09 PROCEDURE — 97530 THERAPEUTIC ACTIVITIES: CPT

## 2018-03-09 PROCEDURE — 97161 PT EVAL LOW COMPLEX 20 MIN: CPT

## 2018-03-09 PROCEDURE — 76000 FLUOROSCOPY <1 HR PHYS/QHP: CPT

## 2018-03-09 PROCEDURE — 88305 TISSUE EXAM BY PATHOLOGIST: CPT

## 2018-03-09 PROCEDURE — 80048 BASIC METABOLIC PNL TOTAL CA: CPT

## 2018-03-09 PROCEDURE — 85027 COMPLETE CBC AUTOMATED: CPT

## 2018-03-09 PROCEDURE — C1889: CPT

## 2018-03-09 PROCEDURE — 83036 HEMOGLOBIN GLYCOSYLATED A1C: CPT

## 2018-03-09 PROCEDURE — 97116 GAIT TRAINING THERAPY: CPT

## 2018-03-09 PROCEDURE — C1713: CPT

## 2018-03-09 PROCEDURE — 97165 OT EVAL LOW COMPLEX 30 MIN: CPT

## 2018-03-09 PROCEDURE — C1776: CPT

## 2018-03-09 PROCEDURE — 97535 SELF CARE MNGMENT TRAINING: CPT

## 2018-03-09 PROCEDURE — 88311 DECALCIFY TISSUE: CPT

## 2018-03-09 PROCEDURE — 97110 THERAPEUTIC EXERCISES: CPT

## 2018-03-09 PROCEDURE — 99233 SBSQ HOSP IP/OBS HIGH 50: CPT

## 2018-03-09 RX ORDER — OXYCODONE HYDROCHLORIDE 5 MG/1
1 TABLET ORAL
Qty: 0 | Refills: 0 | COMMUNITY
Start: 2018-03-09

## 2018-03-09 RX ORDER — OXYCODONE HYDROCHLORIDE 5 MG/1
1 TABLET ORAL
Qty: 0 | Refills: 0 | DISCHARGE
Start: 2018-03-09

## 2018-03-09 RX ADMIN — Medication 1000 MILLIGRAM(S): at 11:05

## 2018-03-09 RX ADMIN — CINACALCET 30 MILLIGRAM(S): 30 TABLET, FILM COATED ORAL at 08:01

## 2018-03-09 RX ADMIN — LITHIUM CARBONATE 300 MILLIGRAM(S): 300 TABLET, EXTENDED RELEASE ORAL at 08:01

## 2018-03-09 RX ADMIN — CELECOXIB 200 MILLIGRAM(S): 200 CAPSULE ORAL at 08:01

## 2018-03-09 RX ADMIN — PANTOPRAZOLE SODIUM 40 MILLIGRAM(S): 20 TABLET, DELAYED RELEASE ORAL at 11:05

## 2018-03-09 RX ADMIN — CELECOXIB 200 MILLIGRAM(S): 200 CAPSULE ORAL at 08:55

## 2018-03-09 RX ADMIN — Medication 1000 MILLIGRAM(S): at 12:01

## 2018-03-09 RX ADMIN — Medication 162 MILLIGRAM(S): at 08:01

## 2018-03-09 NOTE — PROGRESS NOTE ADULT - SUBJECTIVE AND OBJECTIVE BOX
Patient is a 77y old  Female who presents with a chief complaint of " I have pain in my right hip." (07 Mar 2018 22:00)       INTERVAL HPI/OVERNIGHT EVENTS: No new symptoms , complaints. Denies chest pain, palpitation, nausea, vomiting or diarrhea.     MEDICATIONS  (STANDING):  acetaminophen   Tablet. 1000 milliGRAM(s) Oral every 8 hours  aspirin enteric coated 162 milliGRAM(s) Oral <User Schedule>  celecoxib 200 milliGRAM(s) Oral two times a day with meals  cinacalcet 30 milliGRAM(s) Oral two times a day  docusate sodium 100 milliGRAM(s) Oral three times a day  lactated ringers. 1000 milliLiter(s) (75 mL/Hr) IV Continuous <Continuous>  lactated ringers. 1000 milliLiter(s) (100 mL/Hr) IV Continuous <Continuous>  lithium 300 milliGRAM(s) Oral daily  pantoprazole    Tablet 40 milliGRAM(s) Oral daily  senna 2 Tablet(s) Oral at bedtime    MEDICATIONS  (PRN):  aluminum hydroxide/magnesium hydroxide/simethicone Suspension 30 milliLiter(s) Oral four times a day PRN Indigestion  magnesium hydroxide Suspension 30 milliLiter(s) Oral daily PRN Constipation  morphine  - Injectable 5 milliGRAM(s) IV Push every 4 hours PRN Severe Pain (7 - 10)  ondansetron Injectable 4 milliGRAM(s) IV Push every 6 hours PRN Nausea and/or Vomiting  oxyCODONE    IR 5 milliGRAM(s) Oral every 3 hours PRN Mild Pain (1 - 3)  oxyCODONE    IR 10 milliGRAM(s) Oral every 3 hours PRN Moderate Pain (4 - 6)  polyethylene glycol 3350 17 Gram(s) Oral daily PRN Constipation      Allergies    No Known Allergies    Intolerances        REVIEW OF SYSTEMS:  CONSTITUTIONAL: No fever, weight loss, or fatigue  EYES: No eye pain, visual disturbances, or discharge  ENMT:  No difficulty hearing, tinnitus, vertigo; No sinus or throat pain  NECK: No pain or stiffness  RESPIRATORY: No cough, wheezing, chills or hemoptysis; No shortness of breath  CARDIOVASCULAR: No chest pain, palpitations, dizziness, or leg swelling  GASTROINTESTINAL: No abdominal or epigastric pain. No nausea, vomiting, or hematemesis; No diarrhea or constipation. No melena or hematochezia.  GENITOURINARY: No dysuria, frequency, hematuria, or incontinence  NEUROLOGICAL: No headaches, memory loss, loss of strength, numbness, or tremors  SKIN: No itching, burning, rashes, or lesions   LYMPH NODES: No enlarged glands  ENDOCRINE: No heat or cold intolerance; No hair loss; No polydipsia or polyuria  MUSCULOSKELETAL: No joint pain or swelling; No muscle, back, or extremity pain  HEME/LYMPH: No easy bruising, or bleeding gums  ALLERGY AND IMMUNOLOGIC: No hives or eczema    Vital Signs Last 24 Hrs  T(C): 36.8 (08 Mar 2018 03:30), Max: 37 (07 Mar 2018 20:34)  T(F): 98.3 (08 Mar 2018 03:30), Max: 98.6 (07 Mar 2018 20:34)  HR: 72 (08 Mar 2018 03:30) (69 - 78)  BP: 102/55 (08 Mar 2018 03:30) (93/55 - 122/56)  BP(mean): --  RR: 16 (08 Mar 2018 03:30) (13 - 22)  SpO2: 99% (08 Mar 2018 03:30) (96% - 100%)    PHYSICAL EXAM:  GENERAL: NAD, well-groomed, well-developed  HEAD:  Atraumatic, Normocephalic  EYES: EOMI, PERRLA, conjunctiva and sclera clear  ENMT: No tonsillar erythema, exudates, or enlargement; Moist mucous membranes  NECK: Supple, No JVD, Normal thyroid  NERVOUS SYSTEM:  Alert & Oriented X3, Good concentration; Non Focal   CHEST/LUNG: Clear to auscultation bilaterally; No rales, rhonchi, wheezing, or rubs  HEART: Regular rate and rhythm; No murmurs, rubs, or gallops  ABDOMEN: Soft, Nontender, Nondistended; Bowel sounds present  EXTREMITIES:  2+ Peripheral Pulses, No clubbing, cyanosis, or edema  LYMPH: No lymphadenopathy noted  SKIN: No rashes or lesions    LABS:                        9.0    13.4  )-----------( 326      ( 07 Mar 2018 17:05 )             28.2     07 Mar 2018 17:05    139    |  107    |  16     ----------------------------<  197    4.0     |  26     |  0.78     Ca    9.7        07 Mar 2018 17:05        CAPILLARY BLOOD GLUCOSE        BLOOD CULTURE    RADIOLOGY & ADDITIONAL TESTS:    Imaging Personally Reviewed:  [ ] YES     Consultant(s) Notes Reviewed:      Care Discussed with Consultants/Other Providers:
INTERVAL HPI/OVERNIGHT EVENTS:   Patient seen and examined.  Eating, voiding, no BM yet.  Pain controlled.  No fevers, chills, dizziness, cp, sob, n/v/d, abd pain, calf pain, or focal weakness.    REVIEW OF SYSTEMS:  See HPI,  all others negative    PHYSICAL EXAM:  Vital Signs Last 24 Hrs  T(C): 36.6 (09 Mar 2018 07:30), Max: 37 (09 Mar 2018 03:08)  T(F): 97.8 (09 Mar 2018 07:30), Max: 98.6 (09 Mar 2018 03:08)  HR: 73 (09 Mar 2018 07:30) (73 - 76)  BP: 104/64 (09 Mar 2018 07:30) (95/56 - 109/53)  BP(mean): --  RR: 16 (09 Mar 2018 07:30) (14 - 18)  SpO2: 97% (09 Mar 2018 07:30) (96% - 99%)    GENERAL: NAD, well-groomed, well-developed, awake, alert, oriented x 3, fluent and coherent speech, tangential at times  HEAD:  Atraumatic, Normocephalic  EYES: EOMI, PERRLA, conjunctiva and sclera clear  ENMT: No tonsillar erythema, exudates, or enlargement; Moist mucous membranes, Good dentition, No lesions  NECK: Supple, No JVD, No Cervical LAD, No thyromegaly, No thyroid nodules felt  NERVOUS SYSTEM:  Good concentration; Moving all 4 extremities; No gross sensory deficits, No facial droop  CHEST/LUNG: Clear to auscultation bilaterally; No rales, rhonchi, wheezing, or rubs  HEART: Regular rate and rhythm; No murmurs, rubs, or gallops  ABDOMEN: Soft, Nontender, Nondistended, Bowel sounds present, No palpable masses or organomegaly, No bruits  EXTREMITIES:  2+ Peripheral Pulses, No clubbing, cyanosis, or edema  LYMPH: No lymphadenopathy noted  SKIN: No rashes or lesions  INCISION: dressing c/d/i, wound c/d/i, no drainage    LABS:                        9.1    10.9  )-----------( 299      ( 09 Mar 2018 07:35 )             27.9     09 Mar 2018 07:35    141    |  109    |  15     ----------------------------<  101    4.3     |  26     |  0.64     Ca    9.6        09 Mar 2018 07:35
MEDINA SWIFT [id number]    Pt is a 77y year old Female s/p right THR. pain is 3/10. Tolerating regular diet, (+) voids.  Denies chest pain/shortness of breath/nausea/vomitting.     Vital Signs Last 24 Hrs  T(C): 36.6 (09 Mar 2018 07:30), Max: 37 (09 Mar 2018 03:08)  T(F): 97.8 (09 Mar 2018 07:30), Max: 98.6 (09 Mar 2018 03:08)  HR: 73 (09 Mar 2018 07:30) (73 - 76)  BP: 104/64 (09 Mar 2018 07:30) (95/56 - 109/53)  BP(mean): --  RR: 16 (09 Mar 2018 07:30) (14 - 18)  SpO2: 97% (09 Mar 2018 07:30) (96% - 99%)    I&O's Detail    08 Mar 2018 07:01  -  09 Mar 2018 07:00  --------------------------------------------------------  IN:    Oral Fluid: 60 mL  Total IN: 60 mL    OUT:    Voided: 1600 mL  Total OUT: 1600 mL    Total NET: -1540 mL                                9.1    10.9  )-----------( 299      ( 09 Mar 2018 07:35 )             27.9     03-09    141  |  109<H>  |  15  ----------------------------<  101<H>  4.3   |  26  |  0.64    Ca    9.6      09 Mar 2018 07:35          PE:   RLE: Dressing changed, wound clean and intact, no erythema or drainage, prineo intact. Sensation intact to light touch distally, (+2) DP/PT pulses, EHL/FHL/TA intact, Capillary refill < 2 seconds. negative calf tenderness PAS on.    A: 77y year old Female s/p right THR POD#2    Plan:   -DVT ppx = PAS +  aspirin enteric coated 162 milliGRAM(s) Oral BID    -PT/OT = OOB  -Hip dislocation precautions  -Pain control   -Medicine to follow   -Incentive spirometry  dispo: NGA for today pending clearance
ORTHOPEDIC PA PROGRESS NOTE  MEDINA JAMISON      77y Female                                                                                                                               POD #1        STATUS POST:               Pre-Op Dx: Primary osteoarthritis of right hip    Post-Op Dx:  Primary osteoarthritis of right hip    Procedure: Hip replacement                                              Patient comfortable pain controlled.  Martinez discontinued patient had post void tolerating p.o diet. No complaints  Pain (0-10):   Current Pain Management:  [ ] PCA   [ x] Po Analgesics [ ] IM /IV Anagesics     T(F): 98.3  HR: 72  BP: 102/55  RR: 16  SpO2: 99%                        9.0    13.4  )-----------( 326      ( 07 Mar 2018 17:05 )             28.2                     03-07    139  |  107  |  16  ----------------------------<  197<H>  4.0   |  26  |  0.78    Ca    9.7      07 Mar 2018 17:05    Hemovac 100cc  Physical Exam :    -   Surgical site C/D/ hemovac intact   -   Distal Neurvascular status intact grossly.   -   Warm well perfused; capillary refill <3 seconds   -   (+)EHL/FHL 5/5 dorsi/plantar flexion 5/5  -   (+) Sensation to light touch  -   (-) Calf tenderness Bilaterally    A/P: 77y Female s/p Primary osteoarthritis of right hip  -  Patient refused am blood draw.  Will reorder for noon.  Discussed with patient she has acute blood loss anemia and the importance of monitoring blood work.  -   Ortho Stable  -   Pain control   -   Medicine to follow  -   DVT ppx:     [x ]SCDs     [ ] ASA     [ ] Eliquis     [ ] Lovenox  -   Weight bearing status:  WBAT [x ]        PWB    [ ]     TTWB  [ ]      NWB  [ ]   -  Dispo:     Home [ x]     Acute Rehab [ ]     NGA [ ]     TBD [ ]
Orthopaedic Post Op Note    Procedure: Right Ant THR  Surgeon: Ciaran Ward    77y Female comfortable, without complaints. Reported pain score = 0  Denies N/V, CP, SOB, numbness/tingling of extremities.    PE:  Vital Signs Last 24 Hrs  T(C): 36.6 (07 Mar 2018 11:26), Max: 37.1 (07 Mar 2018 06:51)  T(F): 97.8 (07 Mar 2018 11:26), Max: 98.7 (07 Mar 2018 06:51)  HR: 75 (07 Mar 2018 13:10) (72 - 78)  BP: 117/55 (07 Mar 2018 13:10) (100/64 - 134/74)  RR: 15 (07 Mar 2018 13:10) (13 - 22)  SpO2: 100% (07 Mar 2018 13:10) (96% - 100%)  General: Pt alert and oriented   Lungs: + BS CTA bilaterally  Heart: +S1 & S2 heard, RRR  Abd: + BS heard, soft, NT, ND  Right Hip Dressing: C/D/I, hemovac drain in place  Bilateral LEs:  Motor:   5/5 dorsiflexion, plantarflexion, EHL  Sensation intact to LT   2+ DP Pulses    A/P: 77y Female stable POD#0 s/p Right Ant THR   -  Acetaminophen, Dilaudid/Oxycodone for Pain Control   - DVT ppx: Aspirin  - Rosita op IV abx: Ancef  - Celebrex for HO ppx  - Anterior total hip precautions  - PT, OT per protocol  - F/U AM Labs  DCP = home vs. Rehab

## 2018-03-09 NOTE — PROGRESS NOTE ADULT - ASSESSMENT
POD# 2 s/p RIGHT THR  - Pain controlled  - Bowel regimen  - PT/OT  - DVT PPx per Ortho  - Stable for DC to [facility] from medical perspective    Leukocytosis  - reactive post-surgery  - resolved today    Hyperglycemia  - likely due to stress of surgery, hgbA1c 5    Bipolar disorder  - Continue home lithium dose and frequency. POD# 2 s/p RIGHT THR  - Pain controlled  - Bowel regimen  - PT/OT  - DVT PPx per Ortho  - Stable for DC to [facility] from medical perspective    Leukocytosis  - reactive post-surgery  - resolving    Post-Operative Anemia due to blood loss  - asymptomatic, no intervention necessary at this time    Hyperglycemia  - likely due to stress of surgery, hgbA1c 5    Bipolar disorder  - Continue home lithium dose and frequency.

## 2018-03-23 ENCOUNTER — APPOINTMENT (OUTPATIENT)
Dept: ORTHOPEDIC SURGERY | Facility: CLINIC | Age: 78
End: 2018-03-23

## 2018-04-06 ENCOUNTER — APPOINTMENT (OUTPATIENT)
Dept: ORTHOPEDIC SURGERY | Facility: CLINIC | Age: 78
End: 2018-04-06
Payer: MEDICARE

## 2018-04-06 VITALS
WEIGHT: 140 LBS | HEART RATE: 73 BPM | HEIGHT: 64 IN | SYSTOLIC BLOOD PRESSURE: 145 MMHG | BODY MASS INDEX: 23.9 KG/M2 | DIASTOLIC BLOOD PRESSURE: 76 MMHG

## 2018-04-06 PROCEDURE — 99024 POSTOP FOLLOW-UP VISIT: CPT

## 2018-04-06 PROCEDURE — 73502 X-RAY EXAM HIP UNI 2-3 VIEWS: CPT | Mod: RT

## 2018-04-06 RX ORDER — PANTOPRAZOLE 40 MG/1
40 TABLET, DELAYED RELEASE ORAL
Qty: 30 | Refills: 0 | Status: ACTIVE | COMMUNITY
Start: 2018-03-29

## 2018-04-06 RX ORDER — AMOXICILLIN 500 MG/1
500 TABLET, FILM COATED ORAL
Qty: 20 | Refills: 4 | Status: ACTIVE | COMMUNITY
Start: 2018-04-06 | End: 1900-01-01

## 2018-04-06 RX ORDER — MELOXICAM 15 MG/1
15 TABLET ORAL
Qty: 90 | Refills: 0 | Status: ACTIVE | COMMUNITY
Start: 2017-09-08

## 2018-04-06 RX ORDER — LITHIUM CARBONATE 300 MG/1
300 TABLET ORAL
Qty: 30 | Refills: 0 | Status: ACTIVE | COMMUNITY
Start: 2018-03-29

## 2018-04-06 RX ORDER — OMEPRAZOLE 20 MG/1
20 CAPSULE, DELAYED RELEASE ORAL
Qty: 90 | Refills: 0 | Status: ACTIVE | COMMUNITY
Start: 2017-10-16

## 2018-04-06 RX ORDER — CINACALCET HYDROCHLORIDE 30 MG/1
30 TABLET, COATED ORAL
Qty: 60 | Refills: 0 | Status: ACTIVE | COMMUNITY
Start: 2017-11-27

## 2018-04-06 RX ORDER — LITHIUM CARBONATE 300 MG/1
300 CAPSULE ORAL
Qty: 30 | Refills: 0 | Status: ACTIVE | COMMUNITY
Start: 2018-02-19

## 2018-05-10 PROBLEM — Z96.641 STATUS POST TOTAL REPLACEMENT OF RIGHT HIP: Status: ACTIVE | Noted: 2018-03-20

## 2018-05-10 PROBLEM — Z47.1 AFTERCARE FOLLOWING RIGHT HIP JOINT REPLACEMENT SURGERY: Status: ACTIVE | Noted: 2018-03-30

## 2018-05-11 ENCOUNTER — APPOINTMENT (OUTPATIENT)
Dept: ORTHOPEDIC SURGERY | Facility: CLINIC | Age: 78
End: 2018-05-11
Payer: COMMERCIAL

## 2018-05-11 VITALS
HEIGHT: 64 IN | HEART RATE: 76 BPM | WEIGHT: 140 LBS | DIASTOLIC BLOOD PRESSURE: 75 MMHG | BODY MASS INDEX: 23.9 KG/M2 | SYSTOLIC BLOOD PRESSURE: 150 MMHG

## 2018-05-11 DIAGNOSIS — Z96.641 PRESENCE OF RIGHT ARTIFICIAL HIP JOINT: ICD-10-CM

## 2018-05-11 DIAGNOSIS — Z96.641 AFTERCARE FOLLOWING JOINT REPLACEMENT SURGERY: ICD-10-CM

## 2018-05-11 DIAGNOSIS — Z47.1 AFTERCARE FOLLOWING JOINT REPLACEMENT SURGERY: ICD-10-CM

## 2018-05-11 PROCEDURE — 99024 POSTOP FOLLOW-UP VISIT: CPT

## 2018-05-11 PROCEDURE — 73502 X-RAY EXAM HIP UNI 2-3 VIEWS: CPT | Mod: RT

## 2018-07-23 PROBLEM — Z78.9 ALCOHOL USE: Status: INACTIVE | Noted: 2018-02-16

## 2020-05-04 NOTE — PHYSICAL THERAPY INITIAL EVALUATION ADULT - PLANNED THERAPY INTERVENTIONS, PT EVAL
If you are a smoker, it is important for your health to stop smoking. Please be aware that second hand smoke is also harmful.
transfer training/bed mobility training/gait training
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
## 2021-09-01 NOTE — PATIENT PROFILE ADULT. - MEDICATIONS TO TAKE
lithium sensipar omeprazole Localized Dermabrasion Text: The patient was draped in routine manner.  Localized dermabrasion using 3 x 17 mm wire brush was performed in routine manner to papillary dermis. This spot dermabrasion is being performed to complete skin cancer reconstruction. It also will eliminate the other sun damaged precancerous cells that are known to be part of the regional effect of a lifetime's worth of sun exposure. This localized dermabrasion is therapeutic and should not be considered cosmetic in any regard. Localized Dermabrasion With Wire Brush Text: The patient was draped in routine manner.  Localized dermabrasion using 3 x 17 mm wire brush was performed in routine manner to papillary dermis. This spot dermabrasion is being performed to complete skin cancer reconstruction. It also will eliminate the other sun damaged precancerous cells that are known to be part of the regional effect of a lifetime's worth of sun exposure. This localized dermabrasion is therapeutic and should not be considered cosmetic in any regard.

## 2022-12-20 ENCOUNTER — INPATIENT (INPATIENT)
Facility: HOSPITAL | Age: 82
LOS: 3 days | Discharge: OTHER INSTITUTION | DRG: 603 | End: 2022-12-24
Attending: INTERNAL MEDICINE | Admitting: INTERNAL MEDICINE
Payer: MEDICARE

## 2022-12-20 VITALS
HEART RATE: 73 BPM | TEMPERATURE: 99 F | OXYGEN SATURATION: 97 % | SYSTOLIC BLOOD PRESSURE: 126 MMHG | RESPIRATION RATE: 18 BRPM | DIASTOLIC BLOOD PRESSURE: 62 MMHG

## 2022-12-20 DIAGNOSIS — Z98.890 OTHER SPECIFIED POSTPROCEDURAL STATES: Chronic | ICD-10-CM

## 2022-12-20 DIAGNOSIS — L03.211 CELLULITIS OF FACE: ICD-10-CM

## 2022-12-20 DIAGNOSIS — Z98.49 CATARACT EXTRACTION STATUS, UNSPECIFIED EYE: Chronic | ICD-10-CM

## 2022-12-20 PROBLEM — C50.912 MALIGNANT NEOPLASM OF UNSPECIFIED SITE OF LEFT FEMALE BREAST: Chronic | Status: ACTIVE | Noted: 2018-02-23

## 2022-12-20 PROBLEM — M25.551 PAIN IN RIGHT HIP: Chronic | Status: ACTIVE | Noted: 2018-02-23

## 2022-12-20 LAB
ALBUMIN SERPL ELPH-MCNC: 2.4 G/DL — LOW (ref 3.3–5)
ALP SERPL-CCNC: 71 U/L — SIGNIFICANT CHANGE UP (ref 40–120)
ALT FLD-CCNC: 29 U/L — SIGNIFICANT CHANGE UP (ref 12–78)
ANION GAP SERPL CALC-SCNC: 4 MMOL/L — LOW (ref 5–17)
AST SERPL-CCNC: 28 U/L — SIGNIFICANT CHANGE UP (ref 15–37)
BASOPHILS # BLD AUTO: 0.07 K/UL — SIGNIFICANT CHANGE UP (ref 0–0.2)
BASOPHILS NFR BLD AUTO: 1 % — SIGNIFICANT CHANGE UP (ref 0–2)
BILIRUB SERPL-MCNC: 0.2 MG/DL — SIGNIFICANT CHANGE UP (ref 0.2–1.2)
BUN SERPL-MCNC: 24 MG/DL — HIGH (ref 7–23)
CALCIUM SERPL-MCNC: 10.3 MG/DL — HIGH (ref 8.5–10.1)
CHLORIDE SERPL-SCNC: 105 MMOL/L — SIGNIFICANT CHANGE UP (ref 96–108)
CO2 SERPL-SCNC: 26 MMOL/L — SIGNIFICANT CHANGE UP (ref 22–31)
CREAT SERPL-MCNC: 0.72 MG/DL — SIGNIFICANT CHANGE UP (ref 0.5–1.3)
EGFR: 83 ML/MIN/1.73M2 — SIGNIFICANT CHANGE UP
EOSINOPHIL # BLD AUTO: 0.07 K/UL — SIGNIFICANT CHANGE UP (ref 0–0.5)
EOSINOPHIL NFR BLD AUTO: 1 % — SIGNIFICANT CHANGE UP (ref 0–6)
FLUAV AG NPH QL: SIGNIFICANT CHANGE UP
FLUBV AG NPH QL: SIGNIFICANT CHANGE UP
GLUCOSE SERPL-MCNC: 102 MG/DL — HIGH (ref 70–99)
HCT VFR BLD CALC: 32 % — LOW (ref 34.5–45)
HGB BLD-MCNC: 10.1 G/DL — LOW (ref 11.5–15.5)
IMM GRANULOCYTES NFR BLD AUTO: 1 % — HIGH (ref 0–0.9)
LYMPHOCYTES # BLD AUTO: 0.73 K/UL — LOW (ref 1–3.3)
LYMPHOCYTES # BLD AUTO: 10.6 % — LOW (ref 13–44)
MCHC RBC-ENTMCNC: 29.3 PG — SIGNIFICANT CHANGE UP (ref 27–34)
MCHC RBC-ENTMCNC: 31.6 GM/DL — LOW (ref 32–36)
MCV RBC AUTO: 92.8 FL — SIGNIFICANT CHANGE UP (ref 80–100)
MONOCYTES # BLD AUTO: 0.65 K/UL — SIGNIFICANT CHANGE UP (ref 0–0.9)
MONOCYTES NFR BLD AUTO: 9.5 % — SIGNIFICANT CHANGE UP (ref 2–14)
NEUTROPHILS # BLD AUTO: 5.27 K/UL — SIGNIFICANT CHANGE UP (ref 1.8–7.4)
NEUTROPHILS NFR BLD AUTO: 76.9 % — SIGNIFICANT CHANGE UP (ref 43–77)
NRBC # BLD: 0 /100 WBCS — SIGNIFICANT CHANGE UP (ref 0–0)
PLATELET # BLD AUTO: 429 K/UL — HIGH (ref 150–400)
POTASSIUM SERPL-MCNC: 4.8 MMOL/L — SIGNIFICANT CHANGE UP (ref 3.5–5.3)
POTASSIUM SERPL-SCNC: 4.8 MMOL/L — SIGNIFICANT CHANGE UP (ref 3.5–5.3)
PROT SERPL-MCNC: 7.6 G/DL — SIGNIFICANT CHANGE UP (ref 6–8.3)
RBC # BLD: 3.45 M/UL — LOW (ref 3.8–5.2)
RBC # FLD: 15.2 % — HIGH (ref 10.3–14.5)
RSV RNA NPH QL NAA+NON-PROBE: SIGNIFICANT CHANGE UP
SARS-COV-2 RNA SPEC QL NAA+PROBE: SIGNIFICANT CHANGE UP
SODIUM SERPL-SCNC: 135 MMOL/L — SIGNIFICANT CHANGE UP (ref 135–145)
WBC # BLD: 6.86 K/UL — SIGNIFICANT CHANGE UP (ref 3.8–10.5)
WBC # FLD AUTO: 6.86 K/UL — SIGNIFICANT CHANGE UP (ref 3.8–10.5)

## 2022-12-20 PROCEDURE — 93010 ELECTROCARDIOGRAM REPORT: CPT

## 2022-12-20 PROCEDURE — 99285 EMERGENCY DEPT VISIT HI MDM: CPT

## 2022-12-20 PROCEDURE — 70481 CT ORBIT/EAR/FOSSA W/DYE: CPT | Mod: 26,MA

## 2022-12-20 PROCEDURE — 71045 X-RAY EXAM CHEST 1 VIEW: CPT | Mod: 26

## 2022-12-20 RX ORDER — SENNA PLUS 8.6 MG/1
2 TABLET ORAL AT BEDTIME
Refills: 0 | Status: DISCONTINUED | OUTPATIENT
Start: 2022-12-20 | End: 2022-12-21

## 2022-12-20 RX ORDER — SODIUM CHLORIDE 9 MG/ML
1000 INJECTION, SOLUTION INTRAVENOUS
Refills: 0 | Status: DISCONTINUED | OUTPATIENT
Start: 2022-12-20 | End: 2022-12-24

## 2022-12-20 RX ORDER — SODIUM CHLORIDE 9 MG/ML
1000 INJECTION INTRAMUSCULAR; INTRAVENOUS; SUBCUTANEOUS ONCE
Refills: 0 | Status: COMPLETED | OUTPATIENT
Start: 2022-12-20 | End: 2022-12-20

## 2022-12-20 RX ORDER — ONDANSETRON 8 MG/1
4 TABLET, FILM COATED ORAL EVERY 8 HOURS
Refills: 0 | Status: DISCONTINUED | OUTPATIENT
Start: 2022-12-20 | End: 2022-12-24

## 2022-12-20 RX ORDER — LANOLIN ALCOHOL/MO/W.PET/CERES
3 CREAM (GRAM) TOPICAL AT BEDTIME
Refills: 0 | Status: DISCONTINUED | OUTPATIENT
Start: 2022-12-20 | End: 2022-12-24

## 2022-12-20 RX ORDER — PIPERACILLIN AND TAZOBACTAM 4; .5 G/20ML; G/20ML
3.38 INJECTION, POWDER, LYOPHILIZED, FOR SOLUTION INTRAVENOUS ONCE
Refills: 0 | Status: COMPLETED | OUTPATIENT
Start: 2022-12-20 | End: 2022-12-20

## 2022-12-20 RX ORDER — ACETAMINOPHEN 500 MG
650 TABLET ORAL EVERY 6 HOURS
Refills: 0 | Status: DISCONTINUED | OUTPATIENT
Start: 2022-12-20 | End: 2022-12-24

## 2022-12-20 RX ORDER — LACTOBACILLUS ACIDOPHILUS 100MM CELL
1 CAPSULE ORAL
Refills: 0 | Status: DISCONTINUED | OUTPATIENT
Start: 2022-12-20 | End: 2022-12-21

## 2022-12-20 RX ORDER — TRAMADOL HYDROCHLORIDE 50 MG/1
50 TABLET ORAL
Refills: 0 | Status: DISCONTINUED | OUTPATIENT
Start: 2022-12-20 | End: 2022-12-24

## 2022-12-20 RX ORDER — ERYTHROMYCIN BASE 5 MG/GRAM
1 OINTMENT (GRAM) OPHTHALMIC (EYE) EVERY 4 HOURS
Refills: 0 | Status: DISCONTINUED | OUTPATIENT
Start: 2022-12-20 | End: 2022-12-24

## 2022-12-20 RX ORDER — PIPERACILLIN AND TAZOBACTAM 4; .5 G/20ML; G/20ML
3.38 INJECTION, POWDER, LYOPHILIZED, FOR SOLUTION INTRAVENOUS EVERY 8 HOURS
Refills: 0 | Status: DISCONTINUED | OUTPATIENT
Start: 2022-12-20 | End: 2022-12-21

## 2022-12-20 RX ORDER — MAGNESIUM HYDROXIDE 400 MG/1
30 TABLET, CHEWABLE ORAL DAILY
Refills: 0 | Status: DISCONTINUED | OUTPATIENT
Start: 2022-12-20 | End: 2022-12-24

## 2022-12-20 RX ORDER — PANTOPRAZOLE SODIUM 20 MG/1
40 TABLET, DELAYED RELEASE ORAL
Refills: 0 | Status: DISCONTINUED | OUTPATIENT
Start: 2022-12-20 | End: 2022-12-22

## 2022-12-20 RX ADMIN — PIPERACILLIN AND TAZOBACTAM 200 GRAM(S): 4; .5 INJECTION, POWDER, LYOPHILIZED, FOR SOLUTION INTRAVENOUS at 16:24

## 2022-12-20 RX ADMIN — SODIUM CHLORIDE 1000 MILLILITER(S): 9 INJECTION INTRAMUSCULAR; INTRAVENOUS; SUBCUTANEOUS at 16:24

## 2022-12-20 NOTE — ED PROVIDER NOTE - NSICDXPASTSURGICALHX_GEN_ALL_CORE_FT
PAST SURGICAL HISTORY:  S/P cataract extraction, unspecified laterality bilateral - 2016    S/P lumpectomy, left breast

## 2022-12-20 NOTE — H&P ADULT - NSHPADDITIONALINFOADULT_GEN_ALL_CORE
MEDICATIONS  (STANDING):  artificial tears (preservative free) Ophthalmic Solution 1 Drop(s) Both EYES three times a day  cefTRIAXone   IVPB 1000 milliGRAM(s) IV Intermittent every 24 hours  cinacalcet 60 milliGRAM(s) Oral daily  dextrose 5% + sodium chloride 0.45%. 1000 milliLiter(s) (50 mL/Hr) IV Continuous <Continuous>  dextrose 5%. 1000 milliLiter(s) (100 mL/Hr) IV Continuous <Continuous>  dextrose 5%. 1000 milliLiter(s) (50 mL/Hr) IV Continuous <Continuous>  dextrose 50% Injectable 25 Gram(s) IV Push once  dextrose 50% Injectable 12.5 Gram(s) IV Push once  dextrose 50% Injectable 25 Gram(s) IV Push once  erythromycin   Ointment 1 Application(s) Left EYE every 4 hours  ferrous    sulfate Liquid 300 milliGRAM(s) Enteral Tube daily  glucagon  Injectable 1 milliGRAM(s) IntraMuscular once  heparin   Injectable 5000 Unit(s) SubCutaneous every 12 hours  insulin lispro (ADMELOG) corrective regimen sliding scale   SubCutaneous every 6 hours  lactobacillus acidophilus 1 Tablet(s) Oral daily  lithium 300 milliGRAM(s) Oral daily  metroNIDAZOLE  IVPB 500 milliGRAM(s) IV Intermittent every 8 hours  metroNIDAZOLE  IVPB      pantoprazole    Tablet 40 milliGRAM(s) Oral before breakfast  senna 2 Tablet(s) Oral at bedtime  vancomycin  IVPB 1000 milliGRAM(s) IV Intermittent every 24 hours

## 2022-12-20 NOTE — ED ADULT NURSE NOTE - NSIMPLEMENTINTERV_GEN_ALL_ED
Implemented All Fall Risk Interventions:  [place] to call system. Call bell, personal items and telephone within reach. Instruct patient to call for assistance. Room bathroom lighting operational. Non-slip footwear when patient is off stretcher. Physically safe environment: no spills, clutter or unnecessary equipment. Stretcher in lowest position, wheels locked, appropriate side rails in place. Provide visual cue, wrist band, yellow gown, etc. Monitor gait and stability. Monitor for mental status changes and reorient to person, place, and time. Review medications for side effects contributing to fall risk. Reinforce activity limits and safety measures with patient and family.

## 2022-12-20 NOTE — ED ADULT NURSE NOTE - OBJECTIVE STATEMENT
81 yo F BIBEMS co L eye redness and swelling. + drainage from site, pt unable to open eye. Pt unsure of when sx started, pt is a poor historian unable to provide hx.

## 2022-12-20 NOTE — ED PROVIDER NOTE - PROGRESS NOTE DETAILS
Discussed with ophthalmology, Dr. Barrios, sent photos, she states is likely just chemosis, continue frequent lubrication, erythromycin ointment every 4 hours.  Discussed with Dr. Julio, states to admit to her service.

## 2022-12-20 NOTE — H&P ADULT - NSHPLABSRESULTS_GEN_ALL_CORE
10.1   6.86  )-----------( 429      ( 20 Dec 2022 16:10 )             32.0       CBC Full  -  ( 20 Dec 2022 16:10 )  WBC Count : 6.86 K/uL  RBC Count : 3.45 M/uL  Hemoglobin : 10.1 g/dL  Hematocrit : 32.0 %  Platelet Count - Automated : 429 K/uL  Mean Cell Volume : 92.8 fl  Mean Cell Hemoglobin : 29.3 pg  Mean Cell Hemoglobin Concentration : 31.6 gm/dL  Auto Neutrophil # : 5.27 K/uL  Auto Lymphocyte # : 0.73 K/uL  Auto Monocyte # : 0.65 K/uL  Auto Eosinophil # : 0.07 K/uL  Auto Basophil # : 0.07 K/uL  Auto Neutrophil % : 76.9 %  Auto Lymphocyte % : 10.6 %  Auto Monocyte % : 9.5 %  Auto Eosinophil % : 1.0 %  Auto Basophil % : 1.0 %      12-20    135  |  105  |  24<H>  ----------------------------<  102<H>  4.8   |  26  |  0.72    Ca    10.3<H>      20 Dec 2022 16:10    TPro  7.6  /  Alb  2.4<L>  /  TBili  0.2  /  DBili  x   /  AST  28  /  ALT  29  /  AlkPhos  71  12-20      CAPILLARY BLOOD GLUCOSE          Vital Signs Last 24 Hrs  T(C): 37.1 (20 Dec 2022 15:28), Max: 37.1 (20 Dec 2022 15:28)  T(F): 98.8 (20 Dec 2022 15:28), Max: 98.8 (20 Dec 2022 15:28)  HR: 73 (20 Dec 2022 15:28) (73 - 73)  BP: 126/62 (20 Dec 2022 15:28) (126/62 - 126/62)  BP(mean): --  RR: 18 (20 Dec 2022 15:28) (18 - 18)  SpO2: 97% (20 Dec 2022 15:28) (97% - 97%)    Parameters below as of 20 Dec 2022 15:28  Patient On (Oxygen Delivery Method): room air

## 2022-12-20 NOTE — ED PROVIDER NOTE - NSICDXPASTMEDICALHX_GEN_ALL_CORE_FT
PAST MEDICAL HISTORY:  Bipolar mood disorder     Breast cancer, left treated with surgery and RT, no lymph node dissection    Esophageal reflux     Hip pain, acute, right     Hypercalcemia

## 2022-12-20 NOTE — ED PROVIDER NOTE - PHYSICAL EXAMINATION
L diffuse facial redness, mild swelling from upper parietal to maxillary area.  Moderate periorbital swelling, however no erythema to the eye itself.  EOMI. L diffuse facial redness, mild swelling from upper parietal to maxillary area.  Moderate periorbital swelling,.  EOMI.  Moderate conjunctival redness, no acute uptake of fluorescein.  Patient able to see when eyelid held open.

## 2022-12-20 NOTE — H&P ADULT - NSHPSOCIALHISTORY_GEN_ALL_CORE
Social History:    Marital Status:   Occupation:   Lives with:     Substance Use :  Tobacco Usage:  (   ) never smoked   (   ) former smoker   (   ) current smoker  (     ) pack year  (        ) last tobacco use date  Alcohol Usage:      Health Management     For female:   Last Mammo:   Last Pap:     For male:  Last prostate exam:          [  ] date:            (  ) findings      Immunization Hx:   (  ) flu shot                               (     ) date   (  ) pneumonia shot               (     ) date  (  ) tetanus                               (     ) date     (     ) Advanced Directives: (     ) declined   [  ] health care proxy Social History:    Marital Status:   Occupation:   Lives with:     Substance Use :none  Tobacco Usage:  (   ) never smoked   (   ) former smoker   (no   ) current smoker  (     ) pack year  (        ) last tobacco use date  Alcohol Usage: none      Health Management     For female:   Last Mammo:   Last Pap:     For male:  Last prostate exam:          [  ] date:            (  ) findings      Immunization Hx:   (  ) flu shot                               (     ) date   (  ) pneumonia shot               (     ) date  (  ) tetanus                               (     ) date     (     ) Advanced Directives: (     ) declined   [  ] health care proxy

## 2022-12-20 NOTE — ED PROVIDER NOTE - CLINICAL SUMMARY MEDICAL DECISION MAKING FREE TEXT BOX
Acute left-sided facial cellulitis with periorbital swelling rule out orbital cellulitis.  Check labs, IV antibiotics, TBA

## 2022-12-20 NOTE — H&P ADULT - ASSESSMENT
82-year-old female with history of diabetes, anemia, bipolar disorder, delirium/psychosis, edema presents with his current long-term resident of [facility] brought in by EMS for left facial redness and swelling today.  No aggravating or alleviating factors otherwise noted.  No known trauma.  Patient unable to give history, no other acute complaints at this timeBipolar mood disorder     Breast cancer, left treated with surgery and RT, no lymph node dissection    Esophageal reflux     Hip pain, acute, right     Hypercalcemia.     PAST SURGICAL HISTORY:  S/P cataract extraction, unspecified laterality bilateral - 2016    S/P lumpectomy, left breast. < from: CT Orbit w/ IV Cont (12.20.22 @ 17:38) >    ACC: [id number] EXAM:  CT ORBITS IC                          PROCEDURE DATE:  12/20/2022          INTERPRETATION:  CLINICAL STATEMENT: Left facial infection. Rule out   orbital cellulitis    TECHNIQUE: CT of the orbits was performed with IV contrast.Coronal   reformat was obtained. 90 cc of Omnipaque 350 administered    COMPARISON: None.    FINDINGS:  There is soft tissue stranding of the fat in the left preseptal space.   The globes are intact. No inflammatory stranding in the left retrobulbar   fat/intraconal space.    There is no loculated fluid collection.    Small retention cyst/polyp right maxillary sinus    IMPRESSION:  Left preseptal cellulitis without evidence of orbital cellulitis    --- End of Report ---            MEGHAN RODRIGUEZ MD; Attending Radiologist  This document has been electronically signed. Dec 20 2022  7:20PM    < end of copied text >  < from: Xray Fluoro up to 1 Hr in OR (03.07.18 @ 12:49) >    EXAM:  FLOURO IN O.R. 1HR [id number]                                  PROCEDURE DATE:  03/07/2018          INTERPRETATION:  Imaging guidance was provided by the Department of   Radiology for a procedure performed by a physician from another clinical   department. This study was performed and will be interpreted by that   physician and therefore the department of radiology will not render an   interpretation of these images.    This report was generated by an  in   the department of radiology.                  DEPARTMENT RADIOLOGY   This document has been electronically signed. Mar  8 2018 10:09AM                < end of copied text >  < from: Xray Fluoro up to 1 Hr in OR (03.07.18 @ 12:49) >    EXAM:  FLOURO IN O.R. 1HR [id number]                                  PROCEDURE DATE:  03/07/2018          INTERPRETATION:  Imaging guidance was provided by the Department of   Radiology for a procedure performed by a physician from another clinical   department. This study was performed and will be interpreted by that   physician and therefore the department of radiology will not render an   interpretation of these images.    This report was generated by an  in   the department of radiology.                  DEPARTMENT RADIOLOGY   This document has been electronically signed. Mar  8 2018 10:09AM                < end of copied text >  < from: Xray Chest 1 View AP/PA (11.14.14 @ 17:19) >     EXAM:  CHEST 1 VIEW           PROCEDURE DATE:  11/14/2014      INTERPRETATION:  Clinical information: Hypercalcemia    No prior studies present for comparison  Portable study, 5:09 PM  Clear lungs. No sign of infiltrate effusion or congestive failure. Heart   size within normal limits. Aortic knob contains calcifications. Prominent   right paratracheal shadow likely related to tortuous brachiocephalic   vessels. Focal calcification present right humeral neck.  Surgical clips visible along lateral aspect of left chest wall.    IMPRESSION: No active disease.              PILI TESFAYE M.D.,ATTENDING RADIOLOGIST  This examination was interpreted on: Nov 14 2014  5:28P.  This document   has been electronically signed. Nov 14 2014  5:30P.          < end of copied text >

## 2022-12-20 NOTE — H&P ADULT - HISTORY OF PRESENT ILLNESS
82-year-old female with history of diabetes, anemia, bipolar disorder, delirium/psychosis, edema presents with his current long-term resident of [facility] brought in by EMS for left facial redness and swelling today.  No aggravating or alleviating factors otherwise noted.  No known trauma.  Patient unable to give history, no other acute complaints at this timeBipolar mood disorder     Breast cancer, left treated with surgery and RT, no lymph node dissection    Esophageal reflux     Hip pain, acute, right     Hypercalcemia.     PAST SURGICAL HISTORY:  S/P cataract extraction, unspecified laterality bilateral - 2016    S/P lumpectomy, left breast. < from: CT Orbit w/ IV Cont (12.20.22 @ 17:38) >    ACC: [id number] EXAM:  CT ORBITS IC                          PROCEDURE DATE:  12/20/2022          INTERPRETATION:  CLINICAL STATEMENT: Left facial infection. Rule out   orbital cellulitis    TECHNIQUE: CT of the orbits was performed with IV contrast.Coronal   reformat was obtained. 90 cc of Omnipaque 350 administered    COMPARISON: None.    FINDINGS:  There is soft tissue stranding of the fat in the left preseptal space.   The globes are intact. No inflammatory stranding in the left retrobulbar   fat/intraconal space.    There is no loculated fluid collection.    Small retention cyst/polyp right maxillary sinus    IMPRESSION:  Left preseptal cellulitis without evidence of orbital cellulitis    --- End of Report ---            MEGHAN RODRIGUEZ MD; Attending Radiologist  This document has been electronically signed. Dec 20 2022  7:20PM    < end of copied text >  < from: Xray Fluoro up to 1 Hr in OR (03.07.18 @ 12:49) >    EXAM:  FLOURO IN O.R. 1HR [id number]                                  PROCEDURE DATE:  03/07/2018          INTERPRETATION:  Imaging guidance was provided by the Department of   Radiology for a procedure performed by a physician from another clinical   department. This study was performed and will be interpreted by that   physician and therefore the department of radiology will not render an   interpretation of these images.    This report was generated by an  in   the department of radiology.                  DEPARTMENT RADIOLOGY   This document has been electronically signed. Mar  8 2018 10:09AM                < end of copied text >  < from: Xray Fluoro up to 1 Hr in OR (03.07.18 @ 12:49) >    EXAM:  FLOURO IN O.R. 1HR [id number]                                  PROCEDURE DATE:  03/07/2018          INTERPRETATION:  Imaging guidance was provided by the Department of   Radiology for a procedure performed by a physician from another clinical   department. This study was performed and will be interpreted by that   physician and therefore the department of radiology will not render an   interpretation of these images.    This report was generated by an  in   the department of radiology.                  DEPARTMENT RADIOLOGY   This document has been electronically signed. Mar  8 2018 10:09AM                < end of copied text >  < from: Xray Chest 1 View AP/PA (11.14.14 @ 17:19) >     EXAM:  CHEST 1 VIEW           PROCEDURE DATE:  11/14/2014      INTERPRETATION:  Clinical information: Hypercalcemia    No prior studies present for comparison  Portable study, 5:09 PM  Clear lungs. No sign of infiltrate effusion or congestive failure. Heart   size within normal limits. Aortic knob contains calcifications. Prominent   right paratracheal shadow likely related to tortuous brachiocephalic   vessels. Focal calcification present right humeral neck.  Surgical clips visible along lateral aspect of left chest wall.    IMPRESSION: No active disease.              PILI TESFAYE M.D.,ATTENDING RADIOLOGIST  This examination was interpreted on: Nov 14 2014  5:28P.  This document   has been electronically signed. Nov 14 2014  5:30P.          < end of copied text >

## 2022-12-20 NOTE — H&P ADULT - ALLERGIC/IMMUNOLOGIC
I will START or STAY ON the medications listed below when I get home from the hospital:  None
details…

## 2022-12-20 NOTE — ED ADULT TRIAGE NOTE - CHIEF COMPLAINT QUOTE
as per EMS, pt woke up this AM with L eye swelling, redness, drainage. unable to open L eye at this time. pt confused at this time, unable to provide history. pmhx diabetes, anemia, bipolar, delirium.

## 2022-12-20 NOTE — ED PROVIDER NOTE - OBJECTIVE STATEMENT
82-year-old female with history of diabetes, anemia, bipolar disorder, delirium/psychosis, edema presents with his current long-term resident of [facility] brought in by EMS for left facial redness and swelling today.  No aggravating or alleviating factors otherwise noted.  No known trauma.  Patient unable to give history, no other acute complaints at this time.

## 2022-12-21 DIAGNOSIS — H11.422 CONJUNCTIVAL EDEMA, LEFT EYE: ICD-10-CM

## 2022-12-21 DIAGNOSIS — K59.00 CONSTIPATION, UNSPECIFIED: ICD-10-CM

## 2022-12-21 DIAGNOSIS — E83.52 HYPERCALCEMIA: ICD-10-CM

## 2022-12-21 DIAGNOSIS — L03.211 CELLULITIS OF FACE: ICD-10-CM

## 2022-12-21 DIAGNOSIS — F31.9 BIPOLAR DISORDER, UNSPECIFIED: ICD-10-CM

## 2022-12-21 DIAGNOSIS — E11.9 TYPE 2 DIABETES MELLITUS WITHOUT COMPLICATIONS: ICD-10-CM

## 2022-12-21 DIAGNOSIS — K21.9 GASTRO-ESOPHAGEAL REFLUX DISEASE WITHOUT ESOPHAGITIS: ICD-10-CM

## 2022-12-21 DIAGNOSIS — Z29.9 ENCOUNTER FOR PROPHYLACTIC MEASURES, UNSPECIFIED: ICD-10-CM

## 2022-12-21 LAB
AMMONIA BLD-MCNC: <17 UMOL/L — SIGNIFICANT CHANGE UP (ref 11–32)
ANION GAP SERPL CALC-SCNC: 6 MMOL/L — SIGNIFICANT CHANGE UP (ref 5–17)
APPEARANCE UR: CLEAR — SIGNIFICANT CHANGE UP
BACTERIA # UR AUTO: ABNORMAL
BILIRUB UR-MCNC: NEGATIVE — SIGNIFICANT CHANGE UP
BUN SERPL-MCNC: 18 MG/DL — SIGNIFICANT CHANGE UP (ref 7–23)
CALCIUM SERPL-MCNC: 10.3 MG/DL — SIGNIFICANT CHANGE UP (ref 8.4–10.5)
CALCIUM SERPL-MCNC: 9.9 MG/DL — SIGNIFICANT CHANGE UP (ref 8.5–10.1)
CHLORIDE SERPL-SCNC: 105 MMOL/L — SIGNIFICANT CHANGE UP (ref 96–108)
CHOLEST SERPL-MCNC: 172 MG/DL — SIGNIFICANT CHANGE UP
CO2 SERPL-SCNC: 26 MMOL/L — SIGNIFICANT CHANGE UP (ref 22–31)
COLOR SPEC: YELLOW — SIGNIFICANT CHANGE UP
CREAT SERPL-MCNC: 0.71 MG/DL — SIGNIFICANT CHANGE UP (ref 0.5–1.3)
DIFF PNL FLD: ABNORMAL
EGFR: 85 ML/MIN/1.73M2 — SIGNIFICANT CHANGE UP
EPI CELLS # UR: SIGNIFICANT CHANGE UP
GLUCOSE SERPL-MCNC: 120 MG/DL — HIGH (ref 70–99)
GLUCOSE UR QL: NEGATIVE — SIGNIFICANT CHANGE UP
HCT VFR BLD CALC: 31.3 % — LOW (ref 34.5–45)
HDLC SERPL-MCNC: 45 MG/DL — LOW
HGB BLD-MCNC: 9.7 G/DL — LOW (ref 11.5–15.5)
INR BLD: 1.14 RATIO — SIGNIFICANT CHANGE UP (ref 0.88–1.16)
IRON SATN MFR SERPL: 22 UG/DL — LOW (ref 30–160)
IRON SATN MFR SERPL: 9 % — LOW (ref 14–50)
KETONES UR-MCNC: NEGATIVE — SIGNIFICANT CHANGE UP
LEUKOCYTE ESTERASE UR-ACNC: ABNORMAL
LIDOCAIN IGE QN: 49 U/L — LOW (ref 73–393)
LIPID PNL WITH DIRECT LDL SERPL: 105 MG/DL — HIGH
MAGNESIUM SERPL-MCNC: 2.2 MG/DL — SIGNIFICANT CHANGE UP (ref 1.6–2.6)
MCHC RBC-ENTMCNC: 29.1 PG — SIGNIFICANT CHANGE UP (ref 27–34)
MCHC RBC-ENTMCNC: 31 GM/DL — LOW (ref 32–36)
MCV RBC AUTO: 94 FL — SIGNIFICANT CHANGE UP (ref 80–100)
NITRITE UR-MCNC: NEGATIVE — SIGNIFICANT CHANGE UP
NON HDL CHOLESTEROL: 127 MG/DL — SIGNIFICANT CHANGE UP
NRBC # BLD: 0 /100 WBCS — SIGNIFICANT CHANGE UP (ref 0–0)
PH UR: 8 — SIGNIFICANT CHANGE UP (ref 5–8)
PLATELET # BLD AUTO: 410 K/UL — HIGH (ref 150–400)
POTASSIUM SERPL-MCNC: 4.2 MMOL/L — SIGNIFICANT CHANGE UP (ref 3.5–5.3)
POTASSIUM SERPL-SCNC: 4.2 MMOL/L — SIGNIFICANT CHANGE UP (ref 3.5–5.3)
PROT UR-MCNC: 30 MG/DL
PROTHROM AB SERPL-ACNC: 13.4 SEC — SIGNIFICANT CHANGE UP (ref 10.5–13.4)
PTH-INTACT FLD-MCNC: 52 PG/ML — SIGNIFICANT CHANGE UP (ref 15–65)
RBC # BLD: 3.33 M/UL — LOW (ref 3.8–5.2)
RBC # FLD: 15.3 % — HIGH (ref 10.3–14.5)
RBC CASTS # UR COMP ASSIST: SIGNIFICANT CHANGE UP /HPF (ref 0–4)
SODIUM SERPL-SCNC: 137 MMOL/L — SIGNIFICANT CHANGE UP (ref 135–145)
SP GR SPEC: 1.01 — SIGNIFICANT CHANGE UP (ref 1.01–1.02)
TIBC SERPL-MCNC: 258 UG/DL — SIGNIFICANT CHANGE UP (ref 220–430)
TRIGL SERPL-MCNC: 112 MG/DL — SIGNIFICANT CHANGE UP
TROPONIN I, HIGH SENSITIVITY RESULT: 6.7 NG/L — SIGNIFICANT CHANGE UP
TSH SERPL-MCNC: 0.47 UIU/ML — SIGNIFICANT CHANGE UP (ref 0.36–3.74)
UIBC SERPL-MCNC: 236 UG/DL — SIGNIFICANT CHANGE UP (ref 110–370)
URATE SERPL-MCNC: 2.9 MG/DL — SIGNIFICANT CHANGE UP (ref 2.5–7)
UROBILINOGEN FLD QL: NEGATIVE — SIGNIFICANT CHANGE UP
WBC # BLD: 7.21 K/UL — SIGNIFICANT CHANGE UP (ref 3.8–10.5)
WBC # FLD AUTO: 7.21 K/UL — SIGNIFICANT CHANGE UP (ref 3.8–10.5)
WBC UR QL: SIGNIFICANT CHANGE UP

## 2022-12-21 PROCEDURE — 99221 1ST HOSP IP/OBS SF/LOW 40: CPT

## 2022-12-21 PROCEDURE — 99223 1ST HOSP IP/OBS HIGH 75: CPT

## 2022-12-21 RX ORDER — DIPHENHYDRAMINE HCL 50 MG
25 CAPSULE ORAL EVERY 6 HOURS
Refills: 0 | Status: DISCONTINUED | OUTPATIENT
Start: 2022-12-21 | End: 2022-12-24

## 2022-12-21 RX ORDER — INSULIN LISPRO 100/ML
VIAL (ML) SUBCUTANEOUS EVERY 6 HOURS
Refills: 0 | Status: DISCONTINUED | OUTPATIENT
Start: 2022-12-21 | End: 2022-12-24

## 2022-12-21 RX ORDER — DEXTROSE 50 % IN WATER 50 %
15 SYRINGE (ML) INTRAVENOUS ONCE
Refills: 0 | Status: DISCONTINUED | OUTPATIENT
Start: 2022-12-21 | End: 2022-12-24

## 2022-12-21 RX ORDER — DEXTROSE 50 % IN WATER 50 %
25 SYRINGE (ML) INTRAVENOUS ONCE
Refills: 0 | Status: DISCONTINUED | OUTPATIENT
Start: 2022-12-21 | End: 2022-12-24

## 2022-12-21 RX ORDER — SODIUM CHLORIDE 9 MG/ML
1000 INJECTION, SOLUTION INTRAVENOUS
Refills: 0 | Status: DISCONTINUED | OUTPATIENT
Start: 2022-12-21 | End: 2022-12-24

## 2022-12-21 RX ORDER — FERROUS SULFATE 325(65) MG
300 TABLET ORAL DAILY
Refills: 0 | Status: DISCONTINUED | OUTPATIENT
Start: 2022-12-21 | End: 2022-12-24

## 2022-12-21 RX ORDER — POLYETHYLENE GLYCOL 3350 17 G/17G
17 POWDER, FOR SOLUTION ORAL DAILY
Refills: 0 | Status: DISCONTINUED | OUTPATIENT
Start: 2022-12-21 | End: 2022-12-24

## 2022-12-21 RX ORDER — CEFTRIAXONE 500 MG/1
1000 INJECTION, POWDER, FOR SOLUTION INTRAMUSCULAR; INTRAVENOUS EVERY 24 HOURS
Refills: 0 | Status: DISCONTINUED | OUTPATIENT
Start: 2022-12-21 | End: 2022-12-24

## 2022-12-21 RX ORDER — GLUCAGON INJECTION, SOLUTION 0.5 MG/.1ML
1 INJECTION, SOLUTION SUBCUTANEOUS ONCE
Refills: 0 | Status: DISCONTINUED | OUTPATIENT
Start: 2022-12-21 | End: 2022-12-24

## 2022-12-21 RX ORDER — CINACALCET 30 MG/1
60 TABLET, FILM COATED ORAL DAILY
Refills: 0 | Status: DISCONTINUED | OUTPATIENT
Start: 2022-12-21 | End: 2022-12-23

## 2022-12-21 RX ORDER — DEXTROSE 50 % IN WATER 50 %
12.5 SYRINGE (ML) INTRAVENOUS ONCE
Refills: 0 | Status: DISCONTINUED | OUTPATIENT
Start: 2022-12-21 | End: 2022-12-24

## 2022-12-21 RX ORDER — METRONIDAZOLE 500 MG
500 TABLET ORAL ONCE
Refills: 0 | Status: COMPLETED | OUTPATIENT
Start: 2022-12-21 | End: 2022-12-21

## 2022-12-21 RX ORDER — CINACALCET 30 MG/1
30 TABLET, FILM COATED ORAL
Refills: 0 | Status: DISCONTINUED | OUTPATIENT
Start: 2022-12-21 | End: 2022-12-21

## 2022-12-21 RX ORDER — SENNA PLUS 8.6 MG/1
2 TABLET ORAL AT BEDTIME
Refills: 0 | Status: DISCONTINUED | OUTPATIENT
Start: 2022-12-21 | End: 2022-12-24

## 2022-12-21 RX ORDER — HEPARIN SODIUM 5000 [USP'U]/ML
5000 INJECTION INTRAVENOUS; SUBCUTANEOUS EVERY 12 HOURS
Refills: 0 | Status: DISCONTINUED | OUTPATIENT
Start: 2022-12-21 | End: 2022-12-24

## 2022-12-21 RX ORDER — METRONIDAZOLE 500 MG
TABLET ORAL
Refills: 0 | Status: DISCONTINUED | OUTPATIENT
Start: 2022-12-21 | End: 2022-12-24

## 2022-12-21 RX ORDER — LACTOBACILLUS ACIDOPHILUS 100MM CELL
1 CAPSULE ORAL DAILY
Refills: 0 | Status: DISCONTINUED | OUTPATIENT
Start: 2022-12-21 | End: 2022-12-24

## 2022-12-21 RX ORDER — METRONIDAZOLE 500 MG
500 TABLET ORAL EVERY 8 HOURS
Refills: 0 | Status: DISCONTINUED | OUTPATIENT
Start: 2022-12-21 | End: 2022-12-24

## 2022-12-21 RX ORDER — LITHIUM CARBONATE 300 MG/1
300 TABLET, EXTENDED RELEASE ORAL DAILY
Refills: 0 | Status: DISCONTINUED | OUTPATIENT
Start: 2022-12-21 | End: 2022-12-24

## 2022-12-21 RX ORDER — VANCOMYCIN HCL 1 G
1000 VIAL (EA) INTRAVENOUS EVERY 24 HOURS
Refills: 0 | Status: COMPLETED | OUTPATIENT
Start: 2022-12-21 | End: 2022-12-23

## 2022-12-21 RX ADMIN — Medication 100 MILLIGRAM(S): at 21:20

## 2022-12-21 RX ADMIN — Medication 100 MILLIGRAM(S): at 15:14

## 2022-12-21 RX ADMIN — Medication 300 MILLIGRAM(S): at 12:17

## 2022-12-21 RX ADMIN — Medication 1 APPLICATION(S): at 21:19

## 2022-12-21 RX ADMIN — SENNA PLUS 2 TABLET(S): 8.6 TABLET ORAL at 21:20

## 2022-12-21 RX ADMIN — Medication 1 APPLICATION(S): at 03:23

## 2022-12-21 RX ADMIN — PANTOPRAZOLE SODIUM 40 MILLIGRAM(S): 20 TABLET, DELAYED RELEASE ORAL at 05:15

## 2022-12-21 RX ADMIN — Medication 1 APPLICATION(S): at 18:31

## 2022-12-21 RX ADMIN — Medication 1 APPLICATION(S): at 13:39

## 2022-12-21 RX ADMIN — HEPARIN SODIUM 5000 UNIT(S): 5000 INJECTION INTRAVENOUS; SUBCUTANEOUS at 18:31

## 2022-12-21 RX ADMIN — Medication 1 DROP(S): at 21:20

## 2022-12-21 RX ADMIN — Medication 1 APPLICATION(S): at 05:15

## 2022-12-21 RX ADMIN — Medication 1 APPLICATION(S): at 11:16

## 2022-12-21 RX ADMIN — CEFTRIAXONE 100 MILLIGRAM(S): 500 INJECTION, POWDER, FOR SOLUTION INTRAMUSCULAR; INTRAVENOUS at 14:16

## 2022-12-21 RX ADMIN — CINACALCET 60 MILLIGRAM(S): 30 TABLET, FILM COATED ORAL at 12:17

## 2022-12-21 RX ADMIN — SODIUM CHLORIDE 50 MILLILITER(S): 9 INJECTION, SOLUTION INTRAVENOUS at 00:45

## 2022-12-21 RX ADMIN — Medication 1 TABLET(S): at 12:17

## 2022-12-21 RX ADMIN — PIPERACILLIN AND TAZOBACTAM 25 GRAM(S): 4; .5 INJECTION, POWDER, LYOPHILIZED, FOR SOLUTION INTRAVENOUS at 05:15

## 2022-12-21 RX ADMIN — LITHIUM CARBONATE 300 MILLIGRAM(S): 300 TABLET, EXTENDED RELEASE ORAL at 12:17

## 2022-12-21 RX ADMIN — PIPERACILLIN AND TAZOBACTAM 25 GRAM(S): 4; .5 INJECTION, POWDER, LYOPHILIZED, FOR SOLUTION INTRAVENOUS at 00:46

## 2022-12-21 RX ADMIN — SODIUM CHLORIDE 50 MILLILITER(S): 9 INJECTION, SOLUTION INTRAVENOUS at 09:50

## 2022-12-21 RX ADMIN — Medication 250 MILLIGRAM(S): at 16:33

## 2022-12-21 RX ADMIN — Medication 1 DROP(S): at 13:40

## 2022-12-21 NOTE — CONSULT NOTE ADULT - ASSESSMENT
82-year-old female with history of diabetes, anemia, bipolar disorder, delirium/psychosis, edema, from [facility], who presented with left facial redness and swelling. Concern for left preseptal cellulitis based on CT, which did not show evidence of orbital cellulitis. Would cover for most typical pathogens causing this including staph aureus, strep, haemophilis, moraxella and anaerobes.    -suggest ceftriaxone and Flagyl  -add vancomycin pending MRSA nasal PCR  -discontinue Zosy  -follow blood cultures    Thank you for courtesy of this consult.     Will follow.  Discussed with the primary team.     Shahla Apple MD  Division of Infectious Diseases   Cell [phone] between 8am and 6pm   After 6pm and weekends please call ID service at [phone].

## 2022-12-21 NOTE — DIETITIAN INITIAL EVALUATION ADULT - PERTINENT MEDS FT
MEDICATIONS  (STANDING):  artificial tears (preservative free) Ophthalmic Solution 1 Drop(s) Both EYES three times a day  cefTRIAXone   IVPB 1000 milliGRAM(s) IV Intermittent every 24 hours  cinacalcet 60 milliGRAM(s) Oral daily  dextrose 5% + sodium chloride 0.45%. 1000 milliLiter(s) (50 mL/Hr) IV Continuous <Continuous>  dextrose 5%. 1000 milliLiter(s) (100 mL/Hr) IV Continuous <Continuous>  dextrose 5%. 1000 milliLiter(s) (50 mL/Hr) IV Continuous <Continuous>  dextrose 50% Injectable 25 Gram(s) IV Push once  dextrose 50% Injectable 12.5 Gram(s) IV Push once  dextrose 50% Injectable 25 Gram(s) IV Push once  erythromycin   Ointment 1 Application(s) Left EYE every 4 hours  ferrous    sulfate Liquid 300 milliGRAM(s) Enteral Tube daily  glucagon  Injectable 1 milliGRAM(s) IntraMuscular once  heparin   Injectable 5000 Unit(s) SubCutaneous every 12 hours  insulin lispro (ADMELOG) corrective regimen sliding scale   SubCutaneous every 6 hours  lactobacillus acidophilus 1 Tablet(s) Oral daily  lithium 300 milliGRAM(s) Oral daily  metroNIDAZOLE  IVPB      metroNIDAZOLE  IVPB 500 milliGRAM(s) IV Intermittent once  metroNIDAZOLE  IVPB 500 milliGRAM(s) IV Intermittent every 8 hours  pantoprazole    Tablet 40 milliGRAM(s) Oral before breakfast  senna 2 Tablet(s) Oral at bedtime  vancomycin  IVPB 1000 milliGRAM(s) IV Intermittent every 24 hours    MEDICATIONS  (PRN):  acetaminophen     Tablet .. 650 milliGRAM(s) Oral every 6 hours PRN Temp greater or equal to 38C (100.4F), Mild Pain (1 - 3)  aluminum hydroxide/magnesium hydroxide/simethicone Suspension 30 milliLiter(s) Oral every 4 hours PRN Dyspepsia  bisacodyl Suppository 10 milliGRAM(s) Rectal daily PRN Constipation  dextrose Oral Gel 15 Gram(s) Oral once PRN Blood Glucose LESS THAN 70 milliGRAM(s)/deciliter  magnesium hydroxide Suspension 30 milliLiter(s) Oral daily PRN Constipation  melatonin 3 milliGRAM(s) Oral at bedtime PRN Insomnia  ondansetron Injectable 4 milliGRAM(s) IV Push every 8 hours PRN Nausea and/or Vomiting  polyethylene glycol 3350 17 Gram(s) Oral daily PRN Constipation  traMADol 50 milliGRAM(s) Oral four times a day PRN Moderate Pain (4 - 6)

## 2022-12-21 NOTE — SWALLOW BEDSIDE ASSESSMENT ADULT - COMMENTS
HPI: 82-year-old female with history of diabetes, anemia, bipolar disorder, delirium/psychosis, edema presents with his current long-term resident of [facility] brought in by EMS for left facial redness and swelling today.  No aggravating or alleviating factors otherwise noted.  No known trauma.  Patient unable to give history, no other acute complaints at this time. Bipolar mood disorder   WBC WNL  No chest imaging    Orders received and chart reviewed RN reporting patient swallows pills without incident.  Patient is received in bed, alert with confusion, intermittently follows 1 step commands and is uncooperative with swallow assessment. Patient states "I don't want anything. I don't want to eat" and forms tight labial seal when presented with utensil. No swallow triggered throughout.

## 2022-12-21 NOTE — PROGRESS NOTE ADULT - SUBJECTIVE AND OBJECTIVE BOX
PROGRESS NOTE /IM INITIAL EVAL  Patient is a 82y old  Female who presents with a chief complaint of   Chart and available morning labs /imaging are reviewed electronically , urgent issues addressed . More information  is being added upon completion of rounds , when more information is collected and management discussed with consultants , medical staff and social service/case management on the floor   OVERNIGHT  No new issues reported by medical staff . All above noted Patient is resting in a bed comfortably .Confused ,poor mentation .No distress noted   HPI:  82-year-old female with history of diabetes, anemia, bipolar disorder, delirium/psychosis, edema presents with his current long-term resident of [facility] brought in by EMS for left facial redness and swelling today.  No aggravating or alleviating factors otherwise noted.  No known trauma.  Patient unable to give history, no other acute complaints at this timeBipolar mood disorder     Breast cancer, left treated with surgery and RT, no lymph node dissection    Esophageal reflux     Hip pain, acute, right     Hypercalcemia.     PAST SURGICAL HISTORY:  S/P cataract extraction, unspecified laterality bilateral - 2016    S/P lumpectomy, left breast. < from: CT Orbit w/ IV Cont (12.20.22 @ 17:38) >    ACC: [id number] EXAM:  CT ORBITS IC                          PROCEDURE DATE:  12/20/2022          INTERPRETATION:  CLINICAL STATEMENT: Left facial infection. Rule out   orbital cellulitis    TECHNIQUE: CT of the orbits was performed with IV contrast.Coronal   reformat was obtained. 90 cc of Omnipaque 350 administered    COMPARISON: None.    FINDINGS:  There is soft tissue stranding of the fat in the left preseptal space.   The globes are intact. No inflammatory stranding in the left retrobulbar   fat/intraconal space.    There is no loculated fluid collection.    Small retention cyst/polyp right maxillary sinus    IMPRESSION:  Left preseptal cellulitis without evidence of orbital cellulitis    --- End of Report ---            MEGHAN RODRIGUEZ MD; Attending Radiologist  This document has been electronically signed. Dec 20 2022  7:20PM    < end of copied text >  < from: Xray Fluoro up to 1 Hr in OR (03.07.18 @ 12:49) >    EXAM:  FLOURO IN O.R. 1HR [id number]                                  PROCEDURE DATE:  03/07/2018          INTERPRETATION:  Imaging guidance was provided by the Department of   Radiology for a procedure performed by a physician from another clinical   department. This study was performed and will be interpreted by that   physician and therefore the department of radiology will not render an   interpretation of these images.    This report was generated by an  in   the department of radiology.                  DEPARTMENT RADIOLOGY   This document has been electronically signed. Mar  8 2018 10:09AM                < end of copied text >  < from: Xray Fluoro up to 1 Hr in OR (03.07.18 @ 12:49) >    EXAM:  FLOURO IN O.R. 1HR [id number]                                  PROCEDURE DATE:  03/07/2018          INTERPRETATION:  Imaging guidance was provided by the Department of   Radiology for a procedure performed by a physician from another clinical   department. This study was performed and will be interpreted by that   physician and therefore the department of radiology will not render an   interpretation of these images.    This report was generated by an  in   the department of radiology.                  DEPARTMENT RADIOLOGY   This document has been electronically signed. Mar  8 2018 10:09AM                < end of copied text >  < from: Xray Chest 1 View AP/PA (11.14.14 @ 17:19) >     EXAM:  CHEST 1 VIEW           PROCEDURE DATE:  11/14/2014      INTERPRETATION:  Clinical information: Hypercalcemia    No prior studies present for comparison  Portable study, 5:09 PM  Clear lungs. No sign of infiltrate effusion or congestive failure. Heart   size within normal limits. Aortic knob contains calcifications. Prominent   right paratracheal shadow likely related to tortuous brachiocephalic   vessels. Focal calcification present right humeral neck.  Surgical clips visible along lateral aspect of left chest wall.    IMPRESSION: No active disease.              PILI TESFAYE M.D.,ATTENDING RADIOLOGIST  This examination was interpreted on: Nov 14 2014  5:28P.  This document   has been electronically signed. Nov 14 2014  5:30P.          < end of copied text >   (20 Dec 2022 21:00)    PAST MEDICAL & SURGICAL HISTORY:  Bipolar mood disorder      Esophageal reflux      Hypercalcemia      Hip pain, acute, right      Breast cancer, left  treated with surgery and RT, no lymph node dissection      S/P cataract extraction, unspecified laterality  bilateral - 2016      S/P lumpectomy, left breast          MEDICATIONS  (STANDING):  dextrose 5% + sodium chloride 0.45%. 1000 milliLiter(s) (50 mL/Hr) IV Continuous <Continuous>  erythromycin   Ointment 1 Application(s) Left EYE every 4 hours  lactobacillus acidophilus 1 Tablet(s) Oral two times a day with meals  pantoprazole    Tablet 40 milliGRAM(s) Oral before breakfast  piperacillin/tazobactam IVPB.. 3.375 Gram(s) IV Intermittent every 8 hours  senna 2 Tablet(s) Oral at bedtime    MEDICATIONS  (PRN):  acetaminophen     Tablet .. 650 milliGRAM(s) Oral every 6 hours PRN Temp greater or equal to 38C (100.4F), Mild Pain (1 - 3)  aluminum hydroxide/magnesium hydroxide/simethicone Suspension 30 milliLiter(s) Oral every 4 hours PRN Dyspepsia  artificial tears (preservative free) Ophthalmic Solution 1 Drop(s) Both EYES three times a day PRN Dry Eyes  bisacodyl Suppository 10 milliGRAM(s) Rectal daily PRN Constipation  magnesium hydroxide Suspension 30 milliLiter(s) Oral daily PRN Constipation  melatonin 3 milliGRAM(s) Oral at bedtime PRN Insomnia  ondansetron Injectable 4 milliGRAM(s) IV Push every 8 hours PRN Nausea and/or Vomiting  traMADol 50 milliGRAM(s) Oral four times a day PRN Moderate Pain (4 - 6)      OBJECTIVE    T(C): 36.7 (12-21-22 @ 04:32), Max: 37.1 (12-20-22 @ 15:28)  HR: 71 (12-21-22 @ 04:32) (71 - 73)  BP: 128/66 (12-21-22 @ 04:32) (126/62 - 128/66)  RR: 17 (12-21-22 @ 04:32) (17 - 18)  SpO2: 98% (12-21-22 @ 04:32) (97% - 98%)  Wt(kg): --  I&O's Summary        REVIEW OF SYSTEMS:  CONSTITUTIONAL: No fever, weight loss, or fatigue  EYES: No eye pain, visual disturbances, or discharge  ENMT:   No sinus or throat pain  NECK: No pain or stiffness  RESPIRATORY: No cough, wheezing, chills or hemoptysis; No shortness of breath  CARDIOVASCULAR: No chest pain, palpitations, dizziness, or leg swelling  GASTROINTESTINAL: No abdominal pain. No nausea, vomiting; No diarrhea or constipation. No melena or hematochezia.  GENITOURINARY: No dysuria, frequency, hematuria, or incontinence  NEUROLOGICAL: No headaches, memory loss, loss of strength, numbness, or tremors  SKIN: No itching, burning, rashes, or lesions   MUSCULOSKELETAL: No joint pain or swelling; No muscle, back, or extremity pain    PHYSICAL EXAM:  Appearance: NAD. VS past 24 hrs -as above   HEENT:   Moist oral mucosa. Conjunctiva clear b/l. left facial erythema and swelling   Neck : supple  Respiratory: Lungs CTAB.  Gastrointestinal:  Soft, nontender. No rebound. No rigidity. BS present	  Cardiovascular: RRR ,S1S2 present  Neurologic: Non-focal. Moving all extremities.  Extremities: No edema. No erythema. No calf tenderness.  Skin: No rashes, No ecchymoses, No cyanosis.	  wounds ,skin lesions-See skin assesment flow sheet   LABS:                        10.1   6.86  )-----------( 429      ( 20 Dec 2022 16:10 )             32.0     12-20    135  |  105  |  24<H>  ----------------------------<  102<H>  4.8   |  26  |  0.72    Ca    10.3<H>      20 Dec 2022 16:10    TPro  7.6  /  Alb  2.4<L>  /  TBili  0.2  /  DBili  x   /  AST  28  /  ALT  29  /  AlkPhos  71  12-20    CAPILLARY BLOOD GLUCOSE              RADIOLOGY & ADDITIONAL TESTS: < from: CT Orbit w/ IV Cont (12.20.22 @ 17:38) >    INTERPRETATION:  CLINICAL STATEMENT: Left facial infection. Rule out   orbital cellulitis    TECHNIQUE: CT of the orbits was performed with IV contrast.Coronal   reformat was obtained. 90 cc of Omnipaque 350 administered    COMPARISON: None.    FINDINGS:  There is soft tissue stranding of the fat in the left preseptal space.   The globes are intact. No inflammatory stranding in the left retrobulbar   fat/intraconal space.    There is no loculated fluid collection.    Small retention cyst/polyp right maxillary sinus    IMPRESSION:  Left preseptal cellulitis without evidence of orbital cellulitis    < end of copied text >     reviewed elctronically  ASSESSMENT/PLAN: 	     PROGRESS NOTE /IM INITIAL EVAL  Patient is a 82y old  Female who presents with a chief complaint of   Chart and available morning labs /imaging are reviewed electronically , urgent issues addressed . More information  is being added upon completion of rounds , when more information is collected and management discussed with consultants , medical staff and social service/case management on the floor   OVERNIGHT  No new issues reported by medical staff . All above noted Patient is resting in a bed comfortably .Confused ,poor mentation .No distress noted   HPI:  82-year-old female with history of diabetes, anemia, bipolar disorder, delirium/psychosis, edema presents with his current long-term resident of [facility] brought in by EMS for left facial redness and swelling today.  No aggravating or alleviating factors otherwise noted.  No known trauma.  Patient unable to give history, no other acute complaints at this timeBipolar mood disorder     Breast cancer, left treated with surgery and RT, no lymph node dissection    Esophageal reflux     Hip pain, acute, right     Hypercalcemia.     PAST SURGICAL HISTORY:  S/P cataract extraction, unspecified laterality bilateral - 2016    S/P lumpectomy, left breast. < from: CT Orbit w/ IV Cont (12.20.22 @ 17:38) >    ACC: [id number] EXAM:  CT ORBITS IC                          PROCEDURE DATE:  12/20/2022          INTERPRETATION:  CLINICAL STATEMENT: Left facial infection. Rule out   orbital cellulitis    TECHNIQUE: CT of the orbits was performed with IV contrast.Coronal   reformat was obtained. 90 cc of Omnipaque 350 administered    COMPARISON: None.    FINDINGS:  There is soft tissue stranding of the fat in the left preseptal space.   The globes are intact. No inflammatory stranding in the left retrobulbar   fat/intraconal space.    There is no loculated fluid collection.    Small retention cyst/polyp right maxillary sinus    IMPRESSION:  Left preseptal cellulitis without evidence of orbital cellulitis    --- End of Report ---            MEGHAN RODRIGUEZ MD; Attending Radiologist  This document has been electronically signed. Dec 20 2022  7:20PM    < end of copied text >  < from: Xray Fluoro up to 1 Hr in OR (03.07.18 @ 12:49) >    EXAM:  FLOURO IN O.R. 1HR [id number]                                  PROCEDURE DATE:  03/07/2018          INTERPRETATION:  Imaging guidance was provided by the Department of   Radiology for a procedure performed by a physician from another clinical   department. This study was performed and will be interpreted by that   physician and therefore the department of radiology will not render an   interpretation of these images.    This report was generated by an  in   the department of radiology.                  DEPARTMENT RADIOLOGY   This document has been electronically signed. Mar  8 2018 10:09AM                < end of copied text >  < from: Xray Fluoro up to 1 Hr in OR (03.07.18 @ 12:49) >    EXAM:  FLOURO IN O.R. 1HR [id number]                                  PROCEDURE DATE:  03/07/2018          INTERPRETATION:  Imaging guidance was provided by the Department of   Radiology for a procedure performed by a physician from another clinical   department. This study was performed and will be interpreted by that   physician and therefore the department of radiology will not render an   interpretation of these images.    This report was generated by an  in   the department of radiology.                  DEPARTMENT RADIOLOGY   This document has been electronically signed. Mar  8 2018 10:09AM                < end of copied text >  < from: Xray Chest 1 View AP/PA (11.14.14 @ 17:19) >     EXAM:  CHEST 1 VIEW           PROCEDURE DATE:  11/14/2014      INTERPRETATION:  Clinical information: Hypercalcemia    No prior studies present for comparison  Portable study, 5:09 PM  Clear lungs. No sign of infiltrate effusion or congestive failure. Heart   size within normal limits. Aortic knob contains calcifications. Prominent   right paratracheal shadow likely related to tortuous brachiocephalic   vessels. Focal calcification present right humeral neck.  Surgical clips visible along lateral aspect of left chest wall.    IMPRESSION: No active disease.              PILI TESFAYE M.D.,ATTENDING RADIOLOGIST  This examination was interpreted on: Nov 14 2014  5:28P.  This document   has been electronically signed. Nov 14 2014  5:30P.          < end of copied text >   (20 Dec 2022 21:00)    PAST MEDICAL & SURGICAL HISTORY:  Bipolar mood disorder      Esophageal reflux      Hypercalcemia      Hip pain, acute, right      Breast cancer, left  treated with surgery and RT, no lymph node dissection      S/P cataract extraction, unspecified laterality  bilateral - 2016      S/P lumpectomy, left breast          MEDICATIONS  (STANDING):  dextrose 5% + sodium chloride 0.45%. 1000 milliLiter(s) (50 mL/Hr) IV Continuous <Continuous>  erythromycin   Ointment 1 Application(s) Left EYE every 4 hours  lactobacillus acidophilus 1 Tablet(s) Oral two times a day with meals  pantoprazole    Tablet 40 milliGRAM(s) Oral before breakfast  piperacillin/tazobactam IVPB.. 3.375 Gram(s) IV Intermittent every 8 hours  senna 2 Tablet(s) Oral at bedtime    MEDICATIONS  (PRN):  acetaminophen     Tablet .. 650 milliGRAM(s) Oral every 6 hours PRN Temp greater or equal to 38C (100.4F), Mild Pain (1 - 3)  aluminum hydroxide/magnesium hydroxide/simethicone Suspension 30 milliLiter(s) Oral every 4 hours PRN Dyspepsia  artificial tears (preservative free) Ophthalmic Solution 1 Drop(s) Both EYES three times a day PRN Dry Eyes  bisacodyl Suppository 10 milliGRAM(s) Rectal daily PRN Constipation  magnesium hydroxide Suspension 30 milliLiter(s) Oral daily PRN Constipation  melatonin 3 milliGRAM(s) Oral at bedtime PRN Insomnia  ondansetron Injectable 4 milliGRAM(s) IV Push every 8 hours PRN Nausea and/or Vomiting  traMADol 50 milliGRAM(s) Oral four times a day PRN Moderate Pain (4 - 6)      OBJECTIVE    T(C): 36.7 (12-21-22 @ 04:32), Max: 37.1 (12-20-22 @ 15:28)  HR: 71 (12-21-22 @ 04:32) (71 - 73)  BP: 128/66 (12-21-22 @ 04:32) (126/62 - 128/66)  RR: 17 (12-21-22 @ 04:32) (17 - 18)  SpO2: 98% (12-21-22 @ 04:32) (97% - 98%)  Wt(kg): --  I&O's Summary        REVIEW OF SYSTEMS:  CONSTITUTIONAL: No fever, weight loss, or fatigue  EYES: No eye pain, visual disturbances, or discharge  ENMT:   No sinus or throat pain  NECK: No pain or stiffness  RESPIRATORY: No cough, wheezing, chills or hemoptysis; No shortness of breath  CARDIOVASCULAR: No chest pain, palpitations, dizziness, or leg swelling  GASTROINTESTINAL: No abdominal pain. No nausea, vomiting; No diarrhea or constipation. No melena or hematochezia.  GENITOURINARY: No dysuria, frequency, hematuria, or incontinence  NEUROLOGICAL: No headaches, memory loss, loss of strength, numbness, or tremors  SKIN: No itching, burning, rashes, or lesions   MUSCULOSKELETAL: No joint pain or swelling; No muscle, back, or extremity pain    PHYSICAL EXAM:  Appearance: NAD. VS past 24 hrs -as above   HEENT:   Moist oral mucosa. Conjunctiva clear b/l. left facial erythema and swelling   Neck : supple  Respiratory: Lungs CTAB.  Gastrointestinal:  Soft, nontender. No rebound. No rigidity. BS present	  Cardiovascular: RRR ,S1S2 present  Neurologic: Non-focal. Moving all extremities.  Extremities: No edema. No erythema. No calf tenderness.  Skin: No rashes, No ecchymoses, No cyanosis.	  wounds ,skin lesions-See skin assesment flow sheet   LABS:                        10.1   6.86  )-----------( 429      ( 20 Dec 2022 16:10 )             32.0     12-20    135  |  105  |  24<H>  ----------------------------<  102<H>  4.8   |  26  |  0.72    Ca    10.3<H>      20 Dec 2022 16:10    TPro  7.6  /  Alb  2.4<L>  /  TBili  0.2  /  DBili  x   /  AST  28  /  ALT  29  /  AlkPhos  71  12-20    CAPILLARY BLOOD GLUCOSE              RADIOLOGY & ADDITIONAL TESTS: < from: CT Orbit w/ IV Cont (12.20.22 @ 17:38) >    INTERPRETATION:  CLINICAL STATEMENT: Left facial infection. Rule out   orbital cellulitis    TECHNIQUE: CT of the orbits was performed with IV contrast.Coronal   reformat was obtained. 90 cc of Omnipaque 350 administered    COMPARISON: None.    FINDINGS:  There is soft tissue stranding of the fat in the left preseptal space.   The globes are intact. No inflammatory stranding in the left retrobulbar   fat/intraconal space.    There is no loculated fluid collection.    Small retention cyst/polyp right maxillary sinus    IMPRESSION:  Left preseptal cellulitis without evidence of orbital cellulitis    < end of copied text >     reviewed elctronically  ASSESSMENT/PLAN: 	    75minutes spent on this visit, 50% visit time spent in care co-ordination with other attendings and counselling patient ,writing admission orders ( see complete and current orders and order section) ,requesting necessary consults ,informing family about status & plan of care .I have discussed care plan with EVELIN /[facility] wellness/admitting /nursing   department ,outpatient PCP , hospital consultants , ER physician & med staff .

## 2022-12-21 NOTE — DIETITIAN INITIAL EVALUATION ADULT - OTHER INFO
82-year-old female with history of diabetes, anemia, bipolar disorder, delirium/psychosis, edema presents with his current long-term resident of [facility] brought in by EMS for left facial redness and swelling today.  no weight this admit RD weighed on bedscale 60.5 kg (133#) previous admit 2018 weight 128.7#  no weight history from transfer papers

## 2022-12-21 NOTE — CONSULT NOTE ADULT - ASSESSMENT
cellulitis left face- on zosyn  dm2  bipolar disorder  s/p ca left breast  gerd  hypercalcemia  1st degree av block

## 2022-12-21 NOTE — PROGRESS NOTE ADULT - PROBLEM SELECTOR PLAN 5
Gastrointestinal stress ulcer prophylaxis and DVT prophylaxis administered on sensipar ,management as per nephrologist

## 2022-12-21 NOTE — PROGRESS NOTE ADULT - ASSESSMENT
HPI:  82-year-old female with history of diabetes, anemia, bipolar disorder, delirium/psychosis, edema presents with his current long-term resident of [facility] brought in by EMS for left facial redness and swelling today.  No aggravating or alleviating factors otherwise noted.  No known trauma.  Patient unable to give history, no other acute complaints at this timeBipolar mood disorder     Breast cancer, left treated with surgery and RT, no lymph node dissection    Esophageal reflux     Hip pain, acute, right     Hypercalcemia.     PAST SURGICAL HISTORY:  S/P cataract extraction, unspecified laterality bilateral - 2016    S/P lumpectomy, left breast. < from: CT Orbit w/ IV Cont (12.20.22 @ 17:38) >    ACC: [id number] EXAM:  CT ORBITS IC                          PROCEDURE DATE:  12/20/2022          INTERPRETATION:  CLINICAL STATEMENT: Left facial infection. Rule out   orbital cellulitis    TECHNIQUE: CT of the orbits was performed with IV contrast.Coronal   reformat was obtained. 90 cc of Omnipaque 350 administered    COMPARISON: None.    FINDINGS:  There is soft tissue stranding of the fat in the left preseptal space.   The globes are intact. No inflammatory stranding in the left retrobulbar   fat/intraconal space.    There is no loculated fluid collection.    Small retention cyst/polyp right maxillary sinus    IMPRESSION:  Left preseptal cellulitis without evidence of orbital cellulitis    --- End of Report ---            MEGHAN RODRIGUEZ MD; Attending Radiologist  This document has been electronically signed. Dec 20 2022  7:20PM    < end of copied text >  < from: Xray Fluoro up to 1 Hr in OR (03.07.18 @ 12:49) >    EXAM:  FLOURO IN O.R. 1HR [id number]                                  PROCEDURE DATE:  03/07/2018          INTERPRETATION:  Imaging guidance was provided by the Department of   Radiology for a procedure performed by a physician from another clinical   department. This study was performed and will be interpreted by that   physician and therefore the department of radiology will not render an   interpretation of these images.    This report was generated by an  in   the department of radiology.                  DEPARTMENT RADIOLOGY   This document has been electronically signed. Mar  8 2018 10:09AM                < end of copied text >  < from: Xray Fluoro up to 1 Hr in OR (03.07.18 @ 12:49) >    EXAM:  FLOURO IN O.R. 1HR [id number]                                  PROCEDURE DATE:  03/07/2018          INTERPRETATION:  Imaging guidance was provided by the Department of   Radiology for a procedure performed by a physician from another clinical   department. This study was performed and will be interpreted by that   physician and therefore the department of radiology will not render an   interpretation of these images.    This report was generated by an  in   the department of radiology.                  DEPARTMENT RADIOLOGY   This document has been electronically signed. Mar  8 2018 10:09AM                < end of copied text >  < from: Xray Chest 1 View AP/PA (11.14.14 @ 17:19) >     EXAM:  CHEST 1 VIEW           PROCEDURE DATE:  11/14/2014      INTERPRETATION:  Clinical information: Hypercalcemia    No prior studies present for comparison  Portable study, 5:09 PM  Clear lungs. No sign of infiltrate effusion or congestive failure. Heart   size within normal limits. Aortic knob contains calcifications. Prominent   right paratracheal shadow likely related to tortuous brachiocephalic   vessels. Focal calcification present right humeral neck.  Surgical clips visible along lateral aspect of left chest wall.    IMPRESSION: No active disease.              PILI TESFAYE M.D.,ATTENDING RADIOLOGIST  This examination was interpreted on: Nov 14 2014  5:28P.  This document   has been electronically signed. Nov 14 2014  5:30P.          < end of copied text >   (20 Dec 2022 21:00)  hyperparathyroidism   will check pth   ca  phos   continue with sensipar

## 2022-12-21 NOTE — PATIENT PROFILE ADULT - FUNCTIONAL ASSESSMENT - BASIC MOBILITY 6.
1-calculated by average/Not able to assess (calculate score using [facility] averaging method)

## 2022-12-21 NOTE — PROGRESS NOTE ADULT - ASSESSMENT
82-year-old female with history of diabetes, anemia, bipolar disorder, delirium/psychosis, edema presents with his current long-term resident of [facility] brought in by EMS for left facial redness and swelling today.  No aggravating or alleviating factors otherwise noted.  No known trauma.  Patient unable to give history, no other acute complaints at this time .ER physician Discussed with ophthalmology, Dr. Barrios, sent l eye  photos, she states is likely just chemosis, continue frequent lubrication, erythromycin ointment every 4 hours. Septic workup sent and ID consult called Palliative care consult requested ,to discuss advance directives and complete MOLST

## 2022-12-21 NOTE — PATIENT PROFILE ADULT - FALL HARM RISK - HARM RISK INTERVENTIONS
Assistance with ambulation/Assistance OOB with selected safe patient handling equipment/Communicate Risk of Fall with Harm to all staff/Discuss with provider need for PT consult/Monitor gait and stability/Reinforce activity limits and safety measures with patient and family/Tailored Fall Risk Interventions/Visual Cue: Yellow wristband and red socks/Bed in lowest position, wheels locked, appropriate side rails in place/Call bell, personal items and telephone in reach/Instruct patient to call for assistance before getting out of bed or chair/Non-slip footwear when patient is out of bed/[place] to call system/Physically safe environment - no spills, clutter or unnecessary equipment/Purposeful Proactive Rounding/Room/bathroom lighting operational, light cord in reach

## 2022-12-21 NOTE — DIETITIAN INITIAL EVALUATION ADULT - ORAL INTAKE PTA/DIET HISTORY
unable to obtain history from this patient. seen with feeding PEG running at 30ml hr per RN since this AM  patient from [facility] on jevity 1.5 [id number] hr PTA per transfer papers  education not appropriate  patient on PEG feeds

## 2022-12-21 NOTE — DIETITIAN INITIAL EVALUATION ADULT - NS FNS DIET ORDER
Diet, NPO with Tube Feed:   Tube Feeding Modality: Gastrostomy  Jevity 1.5  Total Volume for 24 Hours (mL): 1440  Continuous  Starting Tube Feed Rate {mL per Hour}: 30  Increase Tube Feed Rate by (mL): 10     Every 6 hours  Until Goal Tube Feed Rate (mL per Hour): 60  Tube Feed Duration (in Hours): 24  Tube Feed Start Time: 08:00 (12-21-22 @ 07:30)

## 2022-12-21 NOTE — PROGRESS NOTE ADULT - SUBJECTIVE AND OBJECTIVE BOX
Patient is a 82y Female whom presented to the hospital with     PAST MEDICAL & SURGICAL HISTORY:  Bipolar mood disorder      Esophageal reflux      Hypercalcemia      Hip pain, acute, right      Breast cancer, left  treated with surgery and RT, no lymph node dissection      S/P cataract extraction, unspecified laterality  bilateral - 2016      S/P lumpectomy, left breast          MEDICATIONS  (STANDING):  artificial tears (preservative free) Ophthalmic Solution 1 Drop(s) Both EYES three times a day  cefTRIAXone   IVPB 1000 milliGRAM(s) IV Intermittent every 24 hours  cinacalcet 60 milliGRAM(s) Oral daily  dextrose 5% + sodium chloride 0.45%. 1000 milliLiter(s) (50 mL/Hr) IV Continuous <Continuous>  dextrose 5%. 1000 milliLiter(s) (100 mL/Hr) IV Continuous <Continuous>  dextrose 5%. 1000 milliLiter(s) (50 mL/Hr) IV Continuous <Continuous>  dextrose 50% Injectable 25 Gram(s) IV Push once  dextrose 50% Injectable 12.5 Gram(s) IV Push once  dextrose 50% Injectable 25 Gram(s) IV Push once  erythromycin   Ointment 1 Application(s) Left EYE every 4 hours  ferrous    sulfate Liquid 300 milliGRAM(s) Enteral Tube daily  glucagon  Injectable 1 milliGRAM(s) IntraMuscular once  heparin   Injectable 5000 Unit(s) SubCutaneous every 12 hours  insulin lispro (ADMELOG) corrective regimen sliding scale   SubCutaneous every 6 hours  lactobacillus acidophilus 1 Tablet(s) Oral daily  lithium 300 milliGRAM(s) Oral daily  metroNIDAZOLE  IVPB 500 milliGRAM(s) IV Intermittent every 8 hours  metroNIDAZOLE  IVPB      pantoprazole    Tablet 40 milliGRAM(s) Oral before breakfast  senna 2 Tablet(s) Oral at bedtime  vancomycin  IVPB 1000 milliGRAM(s) IV Intermittent every 24 hours      Allergies    No Known Allergies    Intolerances        SOCIAL HISTORY:  Denies ETOh,Smoking,     FAMILY HISTORY:  No pertinent family history in first degree relatives        REVIEW OF SYSTEMS:    CONSTITUTIONAL: No weakness, fevers or chills  EYES/ENT: No visual changes;  no throat pain   NECK: No pain or stiffness  RESPIRATORY: No cough, wheezing, hemoptysis; No shortness of breath  CARDIOVASCULAR: No chest pain or palpitations  GASTROINTESTINAL: No abdominal or epigastric pain. No nausea, vomiting,     No diarrhea or constipation. No melena   GENITOURINARY: No dysuria, frequency or hematuria  NEUROLOGICAL: No numbness or weakness  SKIN: dry      VITAL:  T(C): , Max: 36.9 (22 @ 12:33)  T(F): , Max: 98.5 (22 @ 12:33)  HR: 70 (22 @ 12:33)  BP: 108/59 (22 @ 12:33)  BP(mean): --  RR: 16 (22 @ 12:33)  SpO2: 95% (22 @ 12:33)  Wt(kg): --    I and O's:     @ 07:01  -   @ 18:29  --------------------------------------------------------  IN: 0 mL / OUT: 300 mL / NET: -300 mL          PHYSICAL EXAM:    Constitutional: NAD  HEENT: conjunctive   clear   Neck:  No JVD  Respiratory: CTAB  Cardiovascular: S1 and S2  Gastrointestinal: BS+, soft, NT/ND  Extremities: No peripheral edema  Neurological: A/O x 3, no focal deficits  Psychiatric: Normal mood, normal affect  : No Martinez  Skin: No rashes  Access: Not applicable    LABS:                        9.7    7.21  )-----------( 410      ( 21 Dec 2022 07:35 )             31.3         137  |  105  |  18  ----------------------------<  120<H>  4.2   |  26  |  0.71    Ca    9.9      21 Dec 2022 07:35  Mg     2.2         TPro  7.6  /  Alb  2.4<L>  /  TBili  0.2  /  DBili  x   /  AST  28  /  ALT  29  /  AlkPhos  71        Urine Studies:  Urinalysis Basic - ( 21 Dec 2022 11:10 )    Color: Yellow / Appearance: Clear / S.010 / pH: x  Gluc: x / Ketone: Negative  / Bili: Negative / Urobili: Negative   Blood: x / Protein: 30 mg/dL / Nitrite: Negative   Leuk Esterase: Trace / RBC: 0-2 /HPF / WBC 3-5   Sq Epi: x / Non Sq Epi: Occasional / Bacteria: Occasional            RADIOLOGY & ADDITIONAL STUDIES:

## 2022-12-21 NOTE — SWALLOW BEDSIDE ASSESSMENT ADULT - SWALLOW EVAL: THERAPY FREQUENCY
SLP to sign off at this time as the patient appears to be functioning at baseline and is not cooperative with evaluation/directives

## 2022-12-21 NOTE — DIETITIAN INITIAL EVALUATION ADULT - PERTINENT LABORATORY DATA
12-21    137  |  105  |  18  ----------------------------<  120<H>  4.2   |  26  |  0.71    Ca    9.9      21 Dec 2022 07:35  Mg     2.2     12-21    TPro  7.6  /  Alb  2.4<L>  /  TBili  0.2  /  DBili  x   /  AST  28  /  ALT  29  /  AlkPhos  71  12-20  POCT Blood Glucose.: 115 mg/dL (12-21-22 @ 11:57)

## 2022-12-21 NOTE — PROGRESS NOTE ADULT - PROBLEM SELECTOR PLAN 4
continue home medications Accuchecks monitoring and insulin corrective regimen  sliding scale coverage with short acting inslulin, add longacting insulin as needed ,no concentrated sweets diet, serial labs ,HbA1C,education

## 2022-12-22 DIAGNOSIS — B00.9 HERPESVIRAL INFECTION, UNSPECIFIED: ICD-10-CM

## 2022-12-22 LAB
A1C WITH ESTIMATED AVERAGE GLUCOSE RESULT: 5.4 % — SIGNIFICANT CHANGE UP (ref 4–5.6)
ALBUMIN SERPL ELPH-MCNC: 2.4 G/DL — LOW (ref 3.3–5)
ALP SERPL-CCNC: 66 U/L — SIGNIFICANT CHANGE UP (ref 40–120)
ALT FLD-CCNC: 25 U/L — SIGNIFICANT CHANGE UP (ref 12–78)
ANION GAP SERPL CALC-SCNC: 7 MMOL/L — SIGNIFICANT CHANGE UP (ref 5–17)
AST SERPL-CCNC: 28 U/L — SIGNIFICANT CHANGE UP (ref 15–37)
BASOPHILS # BLD AUTO: 0.06 K/UL — SIGNIFICANT CHANGE UP (ref 0–0.2)
BASOPHILS NFR BLD AUTO: 0.8 % — SIGNIFICANT CHANGE UP (ref 0–2)
BILIRUB SERPL-MCNC: 0.3 MG/DL — SIGNIFICANT CHANGE UP (ref 0.2–1.2)
BUN SERPL-MCNC: 20 MG/DL — SIGNIFICANT CHANGE UP (ref 7–23)
CALCIUM SERPL-MCNC: 9.3 MG/DL — SIGNIFICANT CHANGE UP (ref 8.4–10.5)
CALCIUM SERPL-MCNC: 9.6 MG/DL — SIGNIFICANT CHANGE UP (ref 8.5–10.1)
CHLORIDE SERPL-SCNC: 104 MMOL/L — SIGNIFICANT CHANGE UP (ref 96–108)
CO2 SERPL-SCNC: 24 MMOL/L — SIGNIFICANT CHANGE UP (ref 22–31)
CREAT SERPL-MCNC: 0.6 MG/DL — SIGNIFICANT CHANGE UP (ref 0.5–1.3)
CULTURE RESULTS: SIGNIFICANT CHANGE UP
EGFR: 90 ML/MIN/1.73M2 — SIGNIFICANT CHANGE UP
EOSINOPHIL # BLD AUTO: 0.1 K/UL — SIGNIFICANT CHANGE UP (ref 0–0.5)
EOSINOPHIL NFR BLD AUTO: 1.3 % — SIGNIFICANT CHANGE UP (ref 0–6)
ESTIMATED AVERAGE GLUCOSE: 108 MG/DL — SIGNIFICANT CHANGE UP (ref 68–114)
GLUCOSE SERPL-MCNC: 116 MG/DL — HIGH (ref 70–99)
HCT VFR BLD CALC: 32 % — LOW (ref 34.5–45)
HGB BLD-MCNC: 10.1 G/DL — LOW (ref 11.5–15.5)
IMM GRANULOCYTES NFR BLD AUTO: 0.8 % — SIGNIFICANT CHANGE UP (ref 0–0.9)
LITHIUM SERPL-MCNC: 0.3 MMOL/L — LOW (ref 0.6–1.2)
LYMPHOCYTES # BLD AUTO: 1.03 K/UL — SIGNIFICANT CHANGE UP (ref 1–3.3)
LYMPHOCYTES # BLD AUTO: 13 % — SIGNIFICANT CHANGE UP (ref 13–44)
MCHC RBC-ENTMCNC: 29.5 PG — SIGNIFICANT CHANGE UP (ref 27–34)
MCHC RBC-ENTMCNC: 31.6 GM/DL — LOW (ref 32–36)
MCV RBC AUTO: 93.6 FL — SIGNIFICANT CHANGE UP (ref 80–100)
MONOCYTES # BLD AUTO: 1 K/UL — HIGH (ref 0–0.9)
MONOCYTES NFR BLD AUTO: 12.6 % — SIGNIFICANT CHANGE UP (ref 2–14)
NEUTROPHILS # BLD AUTO: 5.7 K/UL — SIGNIFICANT CHANGE UP (ref 1.8–7.4)
NEUTROPHILS NFR BLD AUTO: 71.5 % — SIGNIFICANT CHANGE UP (ref 43–77)
NRBC # BLD: 0 /100 WBCS — SIGNIFICANT CHANGE UP (ref 0–0)
PLATELET # BLD AUTO: 401 K/UL — HIGH (ref 150–400)
POTASSIUM SERPL-MCNC: 4.1 MMOL/L — SIGNIFICANT CHANGE UP (ref 3.5–5.3)
POTASSIUM SERPL-SCNC: 4.1 MMOL/L — SIGNIFICANT CHANGE UP (ref 3.5–5.3)
PROT SERPL-MCNC: 7 G/DL — SIGNIFICANT CHANGE UP (ref 6–8.3)
PTH-INTACT FLD-MCNC: 54 PG/ML — SIGNIFICANT CHANGE UP (ref 15–65)
RBC # BLD: 3.42 M/UL — LOW (ref 3.8–5.2)
RBC # FLD: 15.1 % — HIGH (ref 10.3–14.5)
SODIUM SERPL-SCNC: 135 MMOL/L — SIGNIFICANT CHANGE UP (ref 135–145)
SPECIMEN SOURCE: SIGNIFICANT CHANGE UP
WBC # BLD: 7.95 K/UL — SIGNIFICANT CHANGE UP (ref 3.8–10.5)
WBC # FLD AUTO: 7.95 K/UL — SIGNIFICANT CHANGE UP (ref 3.8–10.5)

## 2022-12-22 PROCEDURE — 99233 SBSQ HOSP IP/OBS HIGH 50: CPT

## 2022-12-22 RX ORDER — VALACYCLOVIR 500 MG/1
1000 TABLET, FILM COATED ORAL EVERY 8 HOURS
Refills: 0 | Status: DISCONTINUED | OUTPATIENT
Start: 2022-12-22 | End: 2022-12-24

## 2022-12-22 RX ORDER — PANTOPRAZOLE SODIUM 20 MG/1
40 TABLET, DELAYED RELEASE ORAL DAILY
Refills: 0 | Status: DISCONTINUED | OUTPATIENT
Start: 2022-12-22 | End: 2022-12-24

## 2022-12-22 RX ADMIN — VALACYCLOVIR 1000 MILLIGRAM(S): 500 TABLET, FILM COATED ORAL at 16:23

## 2022-12-22 RX ADMIN — Medication 1 TABLET(S): at 12:28

## 2022-12-22 RX ADMIN — HEPARIN SODIUM 5000 UNIT(S): 5000 INJECTION INTRAVENOUS; SUBCUTANEOUS at 05:22

## 2022-12-22 RX ADMIN — Medication 250 MILLIGRAM(S): at 16:23

## 2022-12-22 RX ADMIN — Medication 1 DROP(S): at 05:22

## 2022-12-22 RX ADMIN — Medication 1 APPLICATION(S): at 05:22

## 2022-12-22 RX ADMIN — Medication 100 MILLIGRAM(S): at 06:09

## 2022-12-22 RX ADMIN — SENNA PLUS 2 TABLET(S): 8.6 TABLET ORAL at 21:59

## 2022-12-22 RX ADMIN — CEFTRIAXONE 100 MILLIGRAM(S): 500 INJECTION, POWDER, FOR SOLUTION INTRAMUSCULAR; INTRAVENOUS at 12:44

## 2022-12-22 RX ADMIN — Medication 100 MILLIGRAM(S): at 13:40

## 2022-12-22 RX ADMIN — Medication 1 APPLICATION(S): at 21:58

## 2022-12-22 RX ADMIN — Medication 1 APPLICATION(S): at 10:17

## 2022-12-22 RX ADMIN — HEPARIN SODIUM 5000 UNIT(S): 5000 INJECTION INTRAVENOUS; SUBCUTANEOUS at 17:28

## 2022-12-22 RX ADMIN — Medication 100 MILLIGRAM(S): at 21:58

## 2022-12-22 RX ADMIN — PANTOPRAZOLE SODIUM 40 MILLIGRAM(S): 20 TABLET, DELAYED RELEASE ORAL at 12:30

## 2022-12-22 RX ADMIN — CINACALCET 60 MILLIGRAM(S): 30 TABLET, FILM COATED ORAL at 12:30

## 2022-12-22 RX ADMIN — Medication 1 APPLICATION(S): at 13:40

## 2022-12-22 RX ADMIN — VALACYCLOVIR 1000 MILLIGRAM(S): 500 TABLET, FILM COATED ORAL at 21:59

## 2022-12-22 RX ADMIN — LITHIUM CARBONATE 300 MILLIGRAM(S): 300 TABLET, EXTENDED RELEASE ORAL at 12:29

## 2022-12-22 RX ADMIN — SODIUM CHLORIDE 50 MILLILITER(S): 9 INJECTION, SOLUTION INTRAVENOUS at 12:30

## 2022-12-22 RX ADMIN — Medication 1 APPLICATION(S): at 17:28

## 2022-12-22 RX ADMIN — Medication 1 DROP(S): at 21:58

## 2022-12-22 RX ADMIN — Medication 300 MILLIGRAM(S): at 12:29

## 2022-12-22 RX ADMIN — Medication 1: at 12:29

## 2022-12-22 NOTE — PROGRESS NOTE ADULT - SUBJECTIVE AND OBJECTIVE BOX
Patient is a 82y Female whom presented to the hospital with high pth     PAST MEDICAL & SURGICAL HISTORY:  Bipolar mood disorder      Esophageal reflux      Hypercalcemia      Hip pain, acute, right      Breast cancer, left  treated with surgery and RT, no lymph node dissection      S/P cataract extraction, unspecified laterality  bilateral - 2016      S/P lumpectomy, left breast          MEDICATIONS  (STANDING):  artificial tears (preservative free) Ophthalmic Solution 1 Drop(s) Both EYES three times a day  cefTRIAXone   IVPB 1000 milliGRAM(s) IV Intermittent every 24 hours  cinacalcet 60 milliGRAM(s) Oral daily  dextrose 5% + sodium chloride 0.45%. 1000 milliLiter(s) (50 mL/Hr) IV Continuous <Continuous>  dextrose 5%. 1000 milliLiter(s) (100 mL/Hr) IV Continuous <Continuous>  dextrose 5%. 1000 milliLiter(s) (50 mL/Hr) IV Continuous <Continuous>  dextrose 50% Injectable 25 Gram(s) IV Push once  dextrose 50% Injectable 12.5 Gram(s) IV Push once  dextrose 50% Injectable 25 Gram(s) IV Push once  erythromycin   Ointment 1 Application(s) Left EYE every 4 hours  ferrous    sulfate Liquid 300 milliGRAM(s) Enteral Tube daily  glucagon  Injectable 1 milliGRAM(s) IntraMuscular once  heparin   Injectable 5000 Unit(s) SubCutaneous every 12 hours  insulin lispro (ADMELOG) corrective regimen sliding scale   SubCutaneous every 6 hours  lactobacillus acidophilus 1 Tablet(s) Oral daily  lithium 300 milliGRAM(s) Oral daily  metroNIDAZOLE  IVPB 500 milliGRAM(s) IV Intermittent every 8 hours  metroNIDAZOLE  IVPB      pantoprazole    Tablet 40 milliGRAM(s) Oral before breakfast  senna 2 Tablet(s) Oral at bedtime  vancomycin  IVPB 1000 milliGRAM(s) IV Intermittent every 24 hours      Allergies    No Known Allergies    Intolerances        SOCIAL HISTORY:  Denies ETOh,Smoking,     FAMILY HISTORY:  No pertinent family history in first degree relatives        REVIEW OF SYSTEMS:    CONSTITUTIONAL: No weakness, fevers or chills  EYES/ENT: No visual changes;  no throat pain   NECK: No pain or stiffness  RESPIRATORY: No cough, wheezing, hemoptysis; No shortness of breath  CARDIOVASCULAR: No chest pain or palpitations  GASTROINTESTINAL: No abdominal or epigastric pain. No nausea, vomiting,     No diarrhea or constipation. No melena   GENITOURINARY: No dysuria, frequency or hematuria  NEUROLOGICAL: No numbness or weakness  SKIN: dry      VITAL:  T(C): , Max: 36.9 (22 @ 12:33)  T(F): , Max: 98.5 (22 @ 12:33)  HR: 70 (22 @ 12:33)  BP: 108/59 (22 @ 12:33)  BP(mean): --  RR: 16 (22 @ 12:33)  SpO2: 95% (22 @ 12:33)  Wt(kg): --    I and O's:     @ 07:01  -   @ 18:29  --------------------------------------------------------  IN: 0 mL / OUT: 300 mL / NET: -300 mL          PHYSICAL EXAM:    Constitutional: NAD  HEENT: conjunctive   clear   Neck:  No JVD  Respiratory: CTAB  Cardiovascular: S1 and S2  Gastrointestinal: BS+, soft, NT/ND  Extremities: No peripheral edema  Neurological: A/O x 3, no focal deficits  Psychiatric: Normal mood, normal affect  : No Martinez  Skin: No rashes  Access: Not applicable    LABS:                        9.7    7.21  )-----------( 410      ( 21 Dec 2022 07:35 )             31.3         137  |  105  |  18  ----------------------------<  120<H>  4.2   |  26  |  0.71    Ca    9.9      21 Dec 2022 07:35  Mg     2.2         TPro  7.6  /  Alb  2.4<L>  /  TBili  0.2  /  DBili  x   /  AST  28  /  ALT  29  /  AlkPhos  71        Urine Studies:  Urinalysis Basic - ( 21 Dec 2022 11:10 )    Color: Yellow / Appearance: Clear / S.010 / pH: x  Gluc: x / Ketone: Negative  / Bili: Negative / Urobili: Negative   Blood: x / Protein: 30 mg/dL / Nitrite: Negative   Leuk Esterase: Trace / RBC: 0-2 /HPF / WBC 3-5   Sq Epi: x / Non Sq Epi: Occasional / Bacteria: Occasional            RADIOLOGY & ADDITIONAL STUDIES:

## 2022-12-22 NOTE — PROGRESS NOTE ADULT - SUBJECTIVE AND OBJECTIVE BOX
Patient is a 82y Female with a known history of :  Facial cellulitis [L03.211]    GERD (gastroesophageal reflux disease) [K21.9]    Bipolar disorder [F31.9]    Prophylactic measure [Z29.9]    Chemosis, conjunctiva, left [H11.422]    DM (diabetes mellitus) [E11.9]    Hypercalcemia [E83.52]    Constipation [K59.00]      HPI:  82-year-old female with history of diabetes, anemia, bipolar disorder, delirium/psychosis, edema presents with his current long-term resident of [facility] brought in by EMS for left facial redness and swelling today.  No aggravating or alleviating factors otherwise noted.  No known trauma.  Patient unable to give history, no other acute complaints at this timeBipolar mood disorder     Breast cancer, left treated with surgery and RT, no lymph node dissection    Esophageal reflux     Hip pain, acute, right     Hypercalcemia.     PAST SURGICAL HISTORY:  S/P cataract extraction, unspecified laterality bilateral - 2016    S/P lumpectomy, left breast. < from: CT Orbit w/ IV Cont (12.20.22 @ 17:38) >    ACC: [id number] EXAM:  CT ORBITS IC                          PROCEDURE DATE:  12/20/2022          INTERPRETATION:  CLINICAL STATEMENT: Left facial infection. Rule out   orbital cellulitis    TECHNIQUE: CT of the orbits was performed with IV contrast.Coronal   reformat was obtained. 90 cc of Omnipaque 350 administered    COMPARISON: None.    FINDINGS:  There is soft tissue stranding of the fat in the left preseptal space.   The globes are intact. No inflammatory stranding in the left retrobulbar   fat/intraconal space.    There is no loculated fluid collection.    Small retention cyst/polyp right maxillary sinus    IMPRESSION:  Left preseptal cellulitis without evidence of orbital cellulitis    --- End of Report ---            MEGHAN RODRIGUEZ MD; Attending Radiologist  This document has been electronically signed. Dec 20 2022  7:20PM    < end of copied text >  < from: Xray Fluoro up to 1 Hr in OR (03.07.18 @ 12:49) >    EXAM:  FLOURO IN O.R. 1HR [id number]                                  PROCEDURE DATE:  03/07/2018          INTERPRETATION:  Imaging guidance was provided by the Department of   Radiology for a procedure performed by a physician from another clinical   department. This study was performed and will be interpreted by that   physician and therefore the department of radiology will not render an   interpretation of these images.    This report was generated by an  in   the department of radiology.                  DEPARTMENT RADIOLOGY   This document has been electronically signed. Mar  8 2018 10:09AM                < end of copied text >  < from: Xray Fluoro up to 1 Hr in OR (03.07.18 @ 12:49) >    EXAM:  FLOURO IN O.R. 1HR [id number]                                  PROCEDURE DATE:  03/07/2018          INTERPRETATION:  Imaging guidance was provided by the Department of   Radiology for a procedure performed by a physician from another clinical   department. This study was performed and will be interpreted by that   physician and therefore the department of radiology will not render an   interpretation of these images.    This report was generated by an  in   the department of radiology.                  DEPARTMENT RADIOLOGY   This document has been electronically signed. Mar  8 2018 10:09AM                < end of copied text >  < from: Xray Chest 1 View AP/PA (11.14.14 @ 17:19) >     EXAM:  CHEST 1 VIEW           PROCEDURE DATE:  11/14/2014      INTERPRETATION:  Clinical information: Hypercalcemia    No prior studies present for comparison  Portable study, 5:09 PM  Clear lungs. No sign of infiltrate effusion or congestive failure. Heart   size within normal limits. Aortic knob contains calcifications. Prominent   right paratracheal shadow likely related to tortuous brachiocephalic   vessels. Focal calcification present right humeral neck.  Surgical clips visible along lateral aspect of left chest wall.    IMPRESSION: No active disease.              PILI TESFAYE M.D.,ATTENDING RADIOLOGIST  This examination was interpreted on: Nov 14 2014  5:28P.  This document   has been electronically signed. Nov 14 2014  5:30P.          < end of copied text >   (20 Dec 2022 21:00)      REVIEW OF SYSTEMS:    CONSTITUTIONAL: No fever, weight loss, or fatigue  EYES: No eye pain, visual disturbances, or discharge  ENMT:  No difficulty hearing, tinnitus, vertigo; No sinus or throat pain  NECK: No pain or stiffness  BREASTS: No pain, masses, or nipple discharge  RESPIRATORY: No cough, wheezing, chills or hemoptysis; No shortness of breath  CARDIOVASCULAR: No chest pain, palpitations, dizziness, or leg swelling  GASTROINTESTINAL: No abdominal or epigastric pain. No nausea, vomiting, or hematemesis; No diarrhea or constipation. No melena or hematochezia.  GENITOURINARY: No dysuria, frequency, hematuria, or incontinence  NEUROLOGICAL: No headaches, memory loss, loss of strength, numbness, or tremors  SKIN: No itching, burning, rashes, or lesions   LYMPH NODES: No enlarged glands  ENDOCRINE: No heat or cold intolerance; No hair loss  MUSCULOSKELETAL: No joint pain or swelling; No muscle, back, or extremity pain  PSYCHIATRIC: No depression, anxiety, mood swings, or difficulty sleeping  HEME/LYMPH: No easy bruising, or bleeding gums  ALLERGY AND IMMUNOLOGIC: No hives or eczema    MEDICATIONS  (STANDING):  artificial tears (preservative free) Ophthalmic Solution 1 Drop(s) Both EYES three times a day  cefTRIAXone   IVPB 1000 milliGRAM(s) IV Intermittent every 24 hours  cinacalcet 60 milliGRAM(s) Oral daily  dextrose 5% + sodium chloride 0.45%. 1000 milliLiter(s) (50 mL/Hr) IV Continuous <Continuous>  dextrose 5%. 1000 milliLiter(s) (50 mL/Hr) IV Continuous <Continuous>  dextrose 5%. 1000 milliLiter(s) (100 mL/Hr) IV Continuous <Continuous>  dextrose 50% Injectable 25 Gram(s) IV Push once  dextrose 50% Injectable 12.5 Gram(s) IV Push once  dextrose 50% Injectable 25 Gram(s) IV Push once  erythromycin   Ointment 1 Application(s) Left EYE every 4 hours  ferrous    sulfate Liquid 300 milliGRAM(s) Enteral Tube daily  glucagon  Injectable 1 milliGRAM(s) IntraMuscular once  heparin   Injectable 5000 Unit(s) SubCutaneous every 12 hours  insulin lispro (ADMELOG) corrective regimen sliding scale   SubCutaneous every 6 hours  lactobacillus acidophilus 1 Tablet(s) Oral daily  lithium 300 milliGRAM(s) Oral daily  metroNIDAZOLE  IVPB      metroNIDAZOLE  IVPB 500 milliGRAM(s) IV Intermittent every 8 hours  pantoprazole  Injectable 40 milliGRAM(s) IV Push daily  senna 2 Tablet(s) Oral at bedtime  vancomycin  IVPB 1000 milliGRAM(s) IV Intermittent every 24 hours    MEDICATIONS  (PRN):  acetaminophen     Tablet .. 650 milliGRAM(s) Oral every 6 hours PRN Temp greater or equal to 38C (100.4F), Mild Pain (1 - 3)  aluminum hydroxide/magnesium hydroxide/simethicone Suspension 30 milliLiter(s) Oral every 4 hours PRN Dyspepsia  bisacodyl Suppository 10 milliGRAM(s) Rectal daily PRN Constipation  dextrose Oral Gel 15 Gram(s) Oral once PRN Blood Glucose LESS THAN 70 milliGRAM(s)/deciliter  diphenhydrAMINE 25 milliGRAM(s) Oral every 6 hours PRN Rash and/or Itching  magnesium hydroxide Suspension 30 milliLiter(s) Oral daily PRN Constipation  melatonin 3 milliGRAM(s) Oral at bedtime PRN Insomnia  ondansetron Injectable 4 milliGRAM(s) IV Push every 8 hours PRN Nausea and/or Vomiting  polyethylene glycol 3350 17 Gram(s) Oral daily PRN Constipation  traMADol 50 milliGRAM(s) Oral four times a day PRN Moderate Pain (4 - 6)      ALLERGIES: No Known Allergies      FAMILY HISTORY:  No pertinent family history in first degree relatives        PHYSICAL EXAMINATION:  -----------------------------  T(C): 37.6 (12-22-22 @ 04:34), Max: 37.7 (12-21-22 @ 19:51)  HR: 66 (12-22-22 @ 04:34) (66 - 70)  BP: 128/57 (12-22-22 @ 04:34) (108/59 - 128/57)  RR: 16 (12-22-22 @ 04:34) (16 - 16)  SpO2: 95% (12-22-22 @ 04:34) (95% - 97%)  Wt(kg): --    12-21 @ 07:01  -  12-22 @ 07:00  --------------------------------------------------------  IN:    dextrose 5% + sodium chloride 0.45%: 550 mL    IV PiggyBack: 100 mL    Jevity 1.5: 390 mL  Total IN: 1040 mL    OUT:    Voided (mL): 950 mL  Total OUT: 950 mL    Total NET: 90 mL            VITALS  T(C): 37.6 (12-22-22 @ 04:34), Max: 37.7 (12-21-22 @ 19:51)  HR: 66 (12-22-22 @ 04:34) (66 - 70)  BP: 128/57 (12-22-22 @ 04:34) (108/59 - 128/57)  RR: 16 (12-22-22 @ 04:34) (16 - 16)  SpO2: 95% (12-22-22 @ 04:34) (95% - 97%)    Constitutional: well developed, normal appearance, well groomed, well nourished, no deformities and no acute distress.   Eyes: the conjunctiva exhibited no abnormalities and the eyelids demonstrated no xanthelasmas.   HEENT: normal oral mucosa, no oral pallor and no oral cyanosis.   Neck: normal jugular venous A waves present, normal jugular venous V waves present and no jugular venous cullen A waves.   Pulmonary: no respiratory distress, normal respiratory rhythm and effort, no accessory muscle use and lungs were clear to auscultation bilaterally.   Cardiovascular: heart rate and rhythm were normal, normal S1 and S2 and no murmur, gallop, rub, heave or thrill are present.   Abdomen: soft, non-tender, no hepato-splenomegaly and no abdominal mass palpated.   Musculoskeletal: the gait could not be assessed..   Extremities: no clubbing of the fingernails, no localized cyanosis, no petechial hemorrhages and no ischemic changes.   Skin: normal skin color and pigmentation, no rash, no venous stasis, no skin lesions, no skin ulcer and no xanthoma was observed.   Psychiatric: oriented to person, place, and time, the affect was normal, the mood was normal and not feeling anxious.     LABS:   --------  12-21    137  |  105  |  18  ----------------------------<  120<H>  4.2   |  26  |  0.71    Ca    9.9      21 Dec 2022 07:35  Mg     2.2     12-21    TPro  7.6  /  Alb  2.4<L>  /  TBili  0.2  /  DBili  x   /  AST  28  /  ALT  29  /  AlkPhos  71  12-20                         9.7    7.21  )-----------( 410      ( 21 Dec 2022 07:35 )             31.3     PT/INR - ( 21 Dec 2022 07:35 )   PT: 13.4 sec;   INR: 1.14 ratio               172 mg/dL, --, 45 mg/dL<L>, 112 mg/dL    Culture Results:   No growth to date. (12-20 @ 16:10)  Culture Results:   No growth to date. (12-20 @ 16:05)      RADIOLOGY:  -----------------    ECG:     ECHO:

## 2022-12-22 NOTE — PROGRESS NOTE ADULT - SUBJECTIVE AND OBJECTIVE BOX
PROGRESS NOTE  Patient is a 82y old  Female who presents with a chief complaint of Cellulitis of face     (21 Dec 2022 13:55)    Chart and available morning labs /imaging are reviewed electronically , urgent issues addressed . More information  is being added upon completion of rounds , when more information is collected and management discussed with consultants , medical staff and social service/case management on the floor   OVERNIGHT  Face erythema and swelling improved ,ID input is appreicated ,case d/w Dr Apple  No new issues reported by medical staff . All above noted Patient is resting in a bed comfortably .Confused ,poor mentation .No distress noted   HPI:  82-year-old female with history of diabetes, anemia, bipolar disorder, delirium/psychosis, edema presents with his current long-term resident of [facility] brought in by EMS for left facial redness and swelling today.  No aggravating or alleviating factors otherwise noted.  No known trauma.  Patient unable to give history, no other acute complaints at this timeBipolar mood disorder     Breast cancer, left treated with surgery and RT, no lymph node dissection    Esophageal reflux     Hip pain, acute, right     Hypercalcemia.     PAST SURGICAL HISTORY:  S/P cataract extraction, unspecified laterality bilateral - 2016    S/P lumpectomy, left breast. < from: CT Orbit w/ IV Cont (22 @ 17:38) >    ACC: [id number] EXAM:  CT ORBITS IC                          PROCEDURE DATE:  2022          INTERPRETATION:  CLINICAL STATEMENT: Left facial infection. Rule out   orbital cellulitis    TECHNIQUE: CT of the orbits was performed with IV contrast.Coronal   reformat was obtained. 90 cc of Omnipaque 350 administered    COMPARISON: None.    FINDINGS:  There is soft tissue stranding of the fat in the left preseptal space.   The globes are intact. No inflammatory stranding in the left retrobulbar   fat/intraconal space.    There is no loculated fluid collection.    Small retention cyst/polyp right maxillary sinus    IMPRESSION:  Left preseptal cellulitis without evidence of orbital cellulitis    --- End of Report ---            MEGHAN RODRIGUEZ MD; Attending Radiologist  This document has been electronically signed. Dec 20 2022  7:20PM    < end of copied text >  < from: Xray Fluoro up to 1 Hr in OR (18 @ 12:49) >    EXAM:  FLOURO IN O.R. 1HR [id number]                                  PROCEDURE DATE:  2018          INTERPRETATION:  Imaging guidance was provided by the Department of   Radiology for a procedure performed by a physician from another clinical   department. This study was performed and will be interpreted by that   physician and therefore the department of radiology will not render an   interpretation of these images.    This report was generated by an  in   the department of radiology.                  DEPARTMENT RADIOLOGY   This document has been electronically signed. Mar  8 2018 10:09AM                < end of copied text >  < from: Xray Fluoro up to 1 Hr in OR (18 @ 12:49) >    EXAM:  FLOURO IN O.R. 1HR [id number]                                  PROCEDURE DATE:  2018          INTERPRETATION:  Imaging guidance was provided by the Department of   Radiology for a procedure performed by a physician from another clinical   department. This study was performed and will be interpreted by that   physician and therefore the department of radiology will not render an   interpretation of these images.    This report was generated by an  in   the department of radiology.                  DEPARTMENT RADIOLOGY   This document has been electronically signed. Mar  8 2018 10:09AM                < end of copied text >  < from: Xray Chest 1 View AP/PA (14 @ 17:19) >     EXAM:  CHEST 1 VIEW           PROCEDURE DATE:  2014      INTERPRETATION:  Clinical information: Hypercalcemia    No prior studies present for comparison  Portable study, 5:09 PM  Clear lungs. No sign of infiltrate effusion or congestive failure. Heart   size within normal limits. Aortic knob contains calcifications. Prominent   right paratracheal shadow likely related to tortuous brachiocephalic   vessels. Focal calcification present right humeral neck.  Surgical clips visible along lateral aspect of left chest wall.    IMPRESSION: No active disease.              PILI TESFAYE M.D.,ATTENDING RADIOLOGIST  This examination was interpreted on: 2014  5:28P.  This document   has been electronically signed. 2014  5:30P.          < end of copied text >   (20 Dec 2022 21:00)    PAST MEDICAL & SURGICAL HISTORY:  Bipolar mood disorder      Esophageal reflux      Hypercalcemia      Hip pain, acute, right      Breast cancer, left  treated with surgery and RT, no lymph node dissection      S/P cataract extraction, unspecified laterality  bilateral - 2016      S/P lumpectomy, left breast          MEDICATIONS  (STANDING):  artificial tears (preservative free) Ophthalmic Solution 1 Drop(s) Both EYES three times a day  cefTRIAXone   IVPB 1000 milliGRAM(s) IV Intermittent every 24 hours  cinacalcet 60 milliGRAM(s) Oral daily  dextrose 5% + sodium chloride 0.45%. 1000 milliLiter(s) (50 mL/Hr) IV Continuous <Continuous>  dextrose 5%. 1000 milliLiter(s) (100 mL/Hr) IV Continuous <Continuous>  dextrose 5%. 1000 milliLiter(s) (50 mL/Hr) IV Continuous <Continuous>  dextrose 50% Injectable 25 Gram(s) IV Push once  dextrose 50% Injectable 12.5 Gram(s) IV Push once  dextrose 50% Injectable 25 Gram(s) IV Push once  erythromycin   Ointment 1 Application(s) Left EYE every 4 hours  ferrous    sulfate Liquid 300 milliGRAM(s) Enteral Tube daily  glucagon  Injectable 1 milliGRAM(s) IntraMuscular once  heparin   Injectable 5000 Unit(s) SubCutaneous every 12 hours  insulin lispro (ADMELOG) corrective regimen sliding scale   SubCutaneous every 6 hours  lactobacillus acidophilus 1 Tablet(s) Oral daily  lithium 300 milliGRAM(s) Oral daily  metroNIDAZOLE  IVPB 500 milliGRAM(s) IV Intermittent every 8 hours  metroNIDAZOLE  IVPB      pantoprazole  Injectable 40 milliGRAM(s) IV Push daily  senna 2 Tablet(s) Oral at bedtime  valACYclovir 1000 milliGRAM(s) Oral every 8 hours  vancomycin  IVPB 1000 milliGRAM(s) IV Intermittent every 24 hours    MEDICATIONS  (PRN):  acetaminophen     Tablet .. 650 milliGRAM(s) Oral every 6 hours PRN Temp greater or equal to 38C (100.4F), Mild Pain (1 - 3)  aluminum hydroxide/magnesium hydroxide/simethicone Suspension 30 milliLiter(s) Oral every 4 hours PRN Dyspepsia  bisacodyl Suppository 10 milliGRAM(s) Rectal daily PRN Constipation  dextrose Oral Gel 15 Gram(s) Oral once PRN Blood Glucose LESS THAN 70 milliGRAM(s)/deciliter  diphenhydrAMINE 25 milliGRAM(s) Oral every 6 hours PRN Rash and/or Itching  magnesium hydroxide Suspension 30 milliLiter(s) Oral daily PRN Constipation  melatonin 3 milliGRAM(s) Oral at bedtime PRN Insomnia  ondansetron Injectable 4 milliGRAM(s) IV Push every 8 hours PRN Nausea and/or Vomiting  polyethylene glycol 3350 17 Gram(s) Oral daily PRN Constipation  traMADol 50 milliGRAM(s) Oral four times a day PRN Moderate Pain (4 - 6)      OBJECTIVE    T(C): 37.4 (22 @ 11:58), Max: 37.6 (22 @ 04:34)  HR: 69 (22 @ 11:58) (66 - 69)  BP: 123/69 (22 @ 11:58) (123/69 - 128/57)  RR: 17 (22 @ 11:58) (16 - 17)  SpO2: 95% (22 @ 11:58) (95% - 95%)  Wt(kg): --  I&O's Summary    21 Dec 2022 07:01  -  22 Dec 2022 07:00  --------------------------------------------------------  IN: 1040 mL / OUT: 950 mL / NET: 90 mL          REVIEW OF SYSTEMS:  CONSTITUTIONAL: No fever, weight loss, or fatigue  EYES: No eye pain, visual disturbances, or discharge  ENMT:   No sinus or throat pain  NECK: No pain or stiffness  RESPIRATORY: No cough, wheezing, chills or hemoptysis; No shortness of breath  CARDIOVASCULAR: No chest pain, palpitations, dizziness, or leg swelling  GASTROINTESTINAL: No abdominal pain. No nausea, vomiting; No diarrhea or constipation. No melena or hematochezia.  GENITOURINARY: No dysuria, frequency, hematuria, or incontinence  NEUROLOGICAL: No headaches, memory loss, loss of strength, numbness, or tremors  SKIN: No itching, burning, rashes, or lesions   MUSCULOSKELETAL: No joint pain or swelling; No muscle, back, or extremity pain    PHYSICAL EXAM:  Appearance: NAD. VS past 24 hrs -as above   HEENT:   Moist oral mucosa. Conjunctiva clear b/l.   Neck : supple  Respiratory: Lungs CTAB.  Gastrointestinal:  Soft, nontender. No rebound. No rigidity. BS present	  Cardiovascular: RRR ,S1S2 present  Neurologic: Non-focal. Moving all extremities.  Extremities: No edema. No erythema. No calf tenderness.  Skin: No rashes, No ecchymoses, No cyanosis.	  wounds ,skin lesions-See skin assesment flow sheet   LABS:                        10.1   7.95  )-----------( 401      ( 22 Dec 2022 06:05 )             32.0     12    135  |  104  |  20  ----------------------------<  116<H>  4.1   |  24  |  0.60    Ca    9.6      22 Dec 2022 06:05  Mg     2.2         TPro  7.0  /  Alb  2.4<L>  /  TBili  0.3  /  DBili  x   /  AST  28  /  ALT  25  /  AlkPhos  66      CAPILLARY BLOOD GLUCOSE      POCT Blood Glucose.: 152 mg/dL (22 Dec 2022 12:21)  POCT Blood Glucose.: 171 mg/dL (22 Dec 2022 07:46)  POCT Blood Glucose.: 123 mg/dL (22 Dec 2022 06:00)  POCT Blood Glucose.: 147 mg/dL (21 Dec 2022 23:52)    PT/INR - ( 21 Dec 2022 07:35 )   PT: 13.4 sec;   INR: 1.14 ratio           Urinalysis Basic - ( 21 Dec 2022 11:10 )    Color: Yellow / Appearance: Clear / S.010 / pH: x  Gluc: x / Ketone: Negative  / Bili: Negative / Urobili: Negative   Blood: x / Protein: 30 mg/dL / Nitrite: Negative   Leuk Esterase: Trace / RBC: 0-2 /HPF / WBC 3-5   Sq Epi: x / Non Sq Epi: Occasional / Bacteria: Occasional        Culture - Urine (collected 21 Dec 2022 11:10)  Source: Clean Catch Clean Catch (Midstream)  Final Report (22 Dec 2022 14:26):    <10,000 CFU/mL Normal Urogenital Joanie    Culture - Blood (collected 20 Dec 2022 16:10)  Source: .Blood Blood-Peripheral  Preliminary Report (22 Dec 2022 02:02):    No growth to date.    Culture - Blood (collected 20 Dec 2022 16:05)  Source: .Blood Blood-Peripheral  Preliminary Report (22 Dec 2022 02:02):    No growth to date.      RADIOLOGY & ADDITIONAL TESTS:   reviewed elctronically  ASSESSMENT/PLAN: 	    25 minutes aggregate time was spent on this visit, 50% visit time spent in care co-ordination with other attendings and counselling patient .I have discussed care plan with patient / HCP/family member ,who expressed understanding of problems treatment and their effect and side effects, alternatives in details. I have asked if they have any questions and concerns and appropriately addressed them to best of my ability.

## 2022-12-22 NOTE — PROGRESS NOTE ADULT - ASSESSMENT
82-year-old female with history of diabetes, anemia, bipolar disorder, delirium/psychosis, edema presents with his current long-term resident of [facility] brought in by EMS for left facial redness and swelling today.  No aggravating or alleviating factors otherwise noted.  No known trauma.  Patient unable to give history, no other acute complaints at this timeBipolar mood disorder     Breast cancer, left treated with surgery and RT, no lymph node dissection    Esophageal reflux     Hip pain, acute, right     Hypercalcemia.     PAST SURGICAL HISTORY:  S/P cataract extraction, unspecified laterality bilateral - 2016    S/P lumpectomy, left breast. < from: CT Orbit w/ IV Cont (12.20.22 @ 17:38) >    ACC: [id number] EXAM:  CT ORBITS IC                          PROCEDURE DATE:  12/20/2022          INTERPRETATION:  CLINICAL STATEMENT: Left facial infection. Rule out   orbital cellulitis    TECHNIQUE: CT of the orbits was performed with IV contrast.Coronal   reformat was obtained. 90 cc of Omnipaque 350 administered    COMPARISON: None.    FINDINGS:  There is soft tissue stranding of the fat in the left preseptal space.   The globes are intact. No inflammatory stranding in the left retrobulbar   fat/intraconal space.    There is no loculated fluid collection.    Small retention cyst/polyp right maxillary sinus    IMPRESSION:  Left preseptal cellulitis without evidence of orbital cellulitis    --- End of Report ---            MEGHAN RODRIGUEZ MD; Attending Radiologist  This document has been electronically signed. Dec 20 2022  7:20PM    < end of copied text >  < from: Xray Fluoro up to 1 Hr in OR (03.07.18 @ 12:49) >    EXAM:  FLOURO IN O.R. 1HR [id number]                                  PROCEDURE DATE:  03/07/2018          INTERPRETATION:  Imaging guidance was provided by the Department of   Radiology for a procedure performed by a physician from another clinical   department. This study was performed and will be interpreted by that   physician and therefore the department of radiology will not render an   interpretation of these images.    This report was generated by an  in   the department of radiology.                  DEPARTMENT RADIOLOGY   This document has been electronically signed. Mar  8 2018 10:09AM                < end of copied text >  < from: Xray Fluoro up to 1 Hr in OR (03.07.18 @ 12:49) >    EXAM:  FLOURO IN O.R. 1HR [id number]                                  PROCEDURE DATE:  03/07/2018          INTERPRETATION:  Imaging guidance was provided by the Department of   Radiology for a procedure performed by a physician from another clinical   department. This study was performed and will be interpreted by that   physician and therefore the department of radiology will not render an   interpretation of these images.    This report was generated by an  in   the department of radiology.                  DEPARTMENT RADIOLOGY   This document has been electronically signed. Mar  8 2018 10:09AM                < end of copied text >  < from: Xray Chest 1 View AP/PA (11.14.14 @ 17:19) >     EXAM:  CHEST 1 VIEW           PROCEDURE DATE:  11/14/2014      INTERPRETATION:  Clinical information: Hypercalcemia    No prior studies present for comparison  Portable study, 5:09 PM  Clear lungs. No sign of infiltrate effusion or congestive failure. Heart   size within normal limits. Aortic knob contains calcifications. Prominent   right paratracheal shadow likely related to tortuous brachiocephalic   vessels. Focal calcification present right humeral neck.  Surgical clips visible along lateral aspect of left chest wall.    IMPRESSION: No active disease.              PILI TESFAYE M.D.,ATTENDING RADIOLOGIST  This examination was interpreted on: Nov 14 2014  5:28P.  This document   has been electronically signed. Nov 14 2014  5:30P.          < end of copied text >   (20 Dec 2022 21:00)  hyperparathyroidism   will check pth   ca  phos   continue with sensipar

## 2022-12-22 NOTE — PROGRESS NOTE ADULT - SUBJECTIVE AND OBJECTIVE BOX
[facility]  INFECTIOUS DISEASES - [address], [place] [address]  Tel: [phone]     Fax: [phone]  =======================================================    JAMISON, VERA [id number]    Follow up: Tmax of 99.9. Denies any facial/eye pain, headache. Denies any blurring of vision.    Allergies:  No Known Allergies      Antibiotics:  acetaminophen     Tablet .. 650 milliGRAM(s) Oral every 6 hours PRN  aluminum hydroxide/magnesium hydroxide/simethicone Suspension 30 milliLiter(s) Oral every 4 hours PRN  artificial tears (preservative free) Ophthalmic Solution 1 Drop(s) Both EYES three times a day  bisacodyl Suppository 10 milliGRAM(s) Rectal daily PRN  cefTRIAXone   IVPB 1000 milliGRAM(s) IV Intermittent every 24 hours  cinacalcet 60 milliGRAM(s) Oral daily  dextrose 5% + sodium chloride 0.45%. 1000 milliLiter(s) IV Continuous <Continuous>  dextrose 5%. 1000 milliLiter(s) IV Continuous <Continuous>  dextrose 5%. 1000 milliLiter(s) IV Continuous <Continuous>  dextrose 50% Injectable 25 Gram(s) IV Push once  dextrose 50% Injectable 12.5 Gram(s) IV Push once  dextrose 50% Injectable 25 Gram(s) IV Push once  dextrose Oral Gel 15 Gram(s) Oral once PRN  diphenhydrAMINE 25 milliGRAM(s) Oral every 6 hours PRN  erythromycin   Ointment 1 Application(s) Left EYE every 4 hours  ferrous    sulfate Liquid 300 milliGRAM(s) Enteral Tube daily  glucagon  Injectable 1 milliGRAM(s) IntraMuscular once  heparin   Injectable 5000 Unit(s) SubCutaneous every 12 hours  insulin lispro (ADMELOG) corrective regimen sliding scale   SubCutaneous every 6 hours  lactobacillus acidophilus 1 Tablet(s) Oral daily  lithium 300 milliGRAM(s) Oral daily  magnesium hydroxide Suspension 30 milliLiter(s) Oral daily PRN  melatonin 3 milliGRAM(s) Oral at bedtime PRN  metroNIDAZOLE  IVPB 500 milliGRAM(s) IV Intermittent every 8 hours  metroNIDAZOLE  IVPB      ondansetron Injectable 4 milliGRAM(s) IV Push every 8 hours PRN  pantoprazole  Injectable 40 milliGRAM(s) IV Push daily  polyethylene glycol 3350 17 Gram(s) Oral daily PRN  senna 2 Tablet(s) Oral at bedtime  traMADol 50 milliGRAM(s) Oral four times a day PRN  valACYclovir 1000 milliGRAM(s) Oral every 8 hours  vancomycin  IVPB 1000 milliGRAM(s) IV Intermittent every 24 hours       REVIEW OF SYSTEMS:  *limited 2/2 patient cooperation*  CONSTITUTIONAL:  No Fever or chills  HEENT:  see history  CARDIOVASCULAR:  No chest pain or SOB.  RESPIRATORY:  No cough, shortness of breath  GASTROINTESTINAL:  No nausea, vomiting, or abdominal pain  NEUROLOGIC:  No headache     Physical Exam:  ICU Vital Signs Last 24 Hrs  T(C): 37.4 (22 Dec 2022 11:58), Max: 37.7 (21 Dec 2022 19:51)  T(F): 99.4 (22 Dec 2022 11:58), Max: 99.9 (21 Dec 2022 19:51)  HR: 69 (22 Dec 2022 11:58) (66 - 69)  BP: 123/69 (22 Dec 2022 11:58) (123/68 - 128/57)  BP(mean): --  ABP: --  ABP(mean): --  RR: 17 (22 Dec 2022 11:58) (16 - 17)  SpO2: 95% (22 Dec 2022 11:58) (95% - 97%)    O2 Parameters below as of 22 Dec 2022 11:58  Patient On (Oxygen Delivery Method): room air         GEN: NAD  HEENT: (+) swelling and erythema around L eye appears less today, some vesicles/pustules noted on L side of forehead and near inner corner of eye  NECK: Supple.    LUNGS: Clear to auscultation.  HEART: Regular rate and rhythm   ABDOMEN: Soft, nontender, and nondistended.    EXTREMITIES: No leg edema.  NEUROLOGIC: Answering some questions    Labs:      135  |  104  |  20  ----------------------------<  116<H>  4.1   |  24  |  0.60    Ca    9.6      22 Dec 2022 06:05  Mg     2.2     12-21    TPro  7.0  /  Alb  2.4<L>  /  TBili  0.3  /  DBili  x   /  AST  28  /  ALT  25  /  AlkPhos  66  12-22                          10.1   7.95  )-----------( 401      ( 22 Dec 2022 06:05 )             32.0     PT/INR - ( 21 Dec 2022 07:35 )   PT: 13.4 sec;   INR: 1.14 ratio           Urinalysis Basic - ( 21 Dec 2022 11:10 )    Color: Yellow / Appearance: Clear / S.010 / pH: x  Gluc: x / Ketone: Negative  / Bili: Negative / Urobili: Negative   Blood: x / Protein: 30 mg/dL / Nitrite: Negative   Leuk Esterase: Trace / RBC: 0-2 /HPF / WBC 3-5   Sq Epi: x / Non Sq Epi: Occasional / Bacteria: Occasional      LIVER FUNCTIONS - ( 22 Dec 2022 06:05 )  Alb: 2.4 g/dL / Pro: 7.0 g/dL / ALK PHOS: 66 U/L / ALT: 25 U/L / AST: 28 U/L / GGT: x             RECENT CULTURES:   @ 16:10 .Blood Blood-Peripheral     No growth to date.         @ 16:05 .Blood Blood-Peripheral     No growth to date.              All imaging and data are reviewed.

## 2022-12-22 NOTE — PROGRESS NOTE ADULT - ASSESSMENT
82-year-old female with history of diabetes, anemia, bipolar disorder, delirium/psychosis, edema, from [facility], who presented with left facial redness and swelling. She was being treated for left preseptal cellulitis, but also concern for herpes zoster given new vesicular/pustular lesions noted today.    Patient denies any pain and erythema/swelling also appear less today. She had a low grade temp but no leukocytosis. Blood cultures currently no growth.    1. Herpes zoster  -start Valacyclovir 1g PO TID   -contact isolation    2. Left preseptal cellulitis  -continue ceftriaxone and Flagyl  -continue vancomycin pending MRSA nasal PCR  -follow MRSA nasal PCR, blood cultures    Shahla Apple MD  Division of Infectious Diseases   Cell [phone] between 8am and 6pm   After 6pm and weekends please call ID service at [phone].      82-year-old female with history of diabetes, anemia, bipolar disorder, delirium/psychosis, edema, from [facility], who presented with left facial redness and swelling. She was being treated for left preseptal cellulitis, but also concern for herpes zoster given new vesicular/pustular lesions noted today. Per Dr. Julio ER discussed case with Ophthalmology.    Patient denies any pain and erythema/swelling also appear less today. She had a low grade temp but no leukocytosis. Blood cultures currently no growth.    1. Herpes zoster  -start Valacyclovir 1g PO TID   -contact isolation    2. Left preseptal cellulitis  -continue ceftriaxone and Flagyl  -continue vancomycin pending MRSA nasal PCR  -follow MRSA nasal PCR, blood cultures    Shahla Apple MD  Division of Infectious Diseases   Cell [phone] between 8am and 6pm   After 6pm and weekends please call ID service at [phone].

## 2022-12-22 NOTE — PROGRESS NOTE ADULT - ASSESSMENT
82-year-old female with history of diabetes, anemia, bipolar disorder, delirium/psychosis, edema presents with his current long-term resident of [facility] brought in by EMS for left facial redness and swelling today.  No aggravating or alleviating factors otherwise noted.  No known trauma.  Patient unable to give history, no other acute complaints at this time .ER physician Discussed with ophthalmology, Dr. Barrios, sent l eye  photos, she states is likely just chemosis, continue frequent lubrication, erythromycin ointment every 4 hours. Septic workup sent and ID consult called Palliative care consult requested ,to discuss advance directives and complete MOLST

## 2022-12-23 LAB
ANION GAP SERPL CALC-SCNC: 8 MMOL/L — SIGNIFICANT CHANGE UP (ref 5–17)
BUN SERPL-MCNC: 18 MG/DL — SIGNIFICANT CHANGE UP (ref 7–23)
CALCIUM SERPL-MCNC: 9.3 MG/DL — SIGNIFICANT CHANGE UP (ref 8.5–10.1)
CHLORIDE SERPL-SCNC: 105 MMOL/L — SIGNIFICANT CHANGE UP (ref 96–108)
CO2 SERPL-SCNC: 24 MMOL/L — SIGNIFICANT CHANGE UP (ref 22–31)
CREAT SERPL-MCNC: 0.6 MG/DL — SIGNIFICANT CHANGE UP (ref 0.5–1.3)
EGFR: 90 ML/MIN/1.73M2 — SIGNIFICANT CHANGE UP
GLUCOSE SERPL-MCNC: 104 MG/DL — HIGH (ref 70–99)
HCT VFR BLD CALC: 29.7 % — LOW (ref 34.5–45)
HGB BLD-MCNC: 9.2 G/DL — LOW (ref 11.5–15.5)
MCHC RBC-ENTMCNC: 28.9 PG — SIGNIFICANT CHANGE UP (ref 27–34)
MCHC RBC-ENTMCNC: 31 GM/DL — LOW (ref 32–36)
MCV RBC AUTO: 93.4 FL — SIGNIFICANT CHANGE UP (ref 80–100)
MRSA PCR RESULT.: SIGNIFICANT CHANGE UP
NRBC # BLD: 0 /100 WBCS — SIGNIFICANT CHANGE UP (ref 0–0)
PLATELET # BLD AUTO: 414 K/UL — HIGH (ref 150–400)
POTASSIUM SERPL-MCNC: 4.1 MMOL/L — SIGNIFICANT CHANGE UP (ref 3.5–5.3)
POTASSIUM SERPL-SCNC: 4.1 MMOL/L — SIGNIFICANT CHANGE UP (ref 3.5–5.3)
RBC # BLD: 3.18 M/UL — LOW (ref 3.8–5.2)
RBC # FLD: 15.1 % — HIGH (ref 10.3–14.5)
S AUREUS DNA NOSE QL NAA+PROBE: SIGNIFICANT CHANGE UP
SODIUM SERPL-SCNC: 137 MMOL/L — SIGNIFICANT CHANGE UP (ref 135–145)
VANCOMYCIN TROUGH SERPL-MCNC: 5.2 UG/ML — LOW (ref 10–20)
WBC # BLD: 7.42 K/UL — SIGNIFICANT CHANGE UP (ref 3.8–10.5)
WBC # FLD AUTO: 7.42 K/UL — SIGNIFICANT CHANGE UP (ref 3.8–10.5)

## 2022-12-23 PROCEDURE — 99233 SBSQ HOSP IP/OBS HIGH 50: CPT

## 2022-12-23 RX ORDER — CINACALCET 30 MG/1
30 TABLET, FILM COATED ORAL
Refills: 0 | Status: DISCONTINUED | OUTPATIENT
Start: 2022-12-23 | End: 2022-12-24

## 2022-12-23 RX ADMIN — Medication 250 MILLIGRAM(S): at 16:51

## 2022-12-23 RX ADMIN — Medication 100 MILLIGRAM(S): at 15:33

## 2022-12-23 RX ADMIN — LITHIUM CARBONATE 300 MILLIGRAM(S): 300 TABLET, EXTENDED RELEASE ORAL at 12:43

## 2022-12-23 RX ADMIN — CINACALCET 60 MILLIGRAM(S): 30 TABLET, FILM COATED ORAL at 12:41

## 2022-12-23 RX ADMIN — CEFTRIAXONE 100 MILLIGRAM(S): 500 INJECTION, POWDER, FOR SOLUTION INTRAMUSCULAR; INTRAVENOUS at 14:57

## 2022-12-23 RX ADMIN — VALACYCLOVIR 1000 MILLIGRAM(S): 500 TABLET, FILM COATED ORAL at 05:47

## 2022-12-23 RX ADMIN — Medication 300 MILLIGRAM(S): at 12:41

## 2022-12-23 RX ADMIN — PANTOPRAZOLE SODIUM 40 MILLIGRAM(S): 20 TABLET, DELAYED RELEASE ORAL at 15:00

## 2022-12-23 RX ADMIN — Medication 1 DROP(S): at 05:47

## 2022-12-23 RX ADMIN — Medication 1 APPLICATION(S): at 17:34

## 2022-12-23 RX ADMIN — Medication 1 TABLET(S): at 12:42

## 2022-12-23 RX ADMIN — HEPARIN SODIUM 5000 UNIT(S): 5000 INJECTION INTRAVENOUS; SUBCUTANEOUS at 17:35

## 2022-12-23 RX ADMIN — Medication 1 APPLICATION(S): at 22:54

## 2022-12-23 RX ADMIN — VALACYCLOVIR 1000 MILLIGRAM(S): 500 TABLET, FILM COATED ORAL at 22:54

## 2022-12-23 RX ADMIN — Medication 1 APPLICATION(S): at 15:27

## 2022-12-23 RX ADMIN — HEPARIN SODIUM 5000 UNIT(S): 5000 INJECTION INTRAVENOUS; SUBCUTANEOUS at 05:47

## 2022-12-23 RX ADMIN — SODIUM CHLORIDE 50 MILLILITER(S): 9 INJECTION, SOLUTION INTRAVENOUS at 16:52

## 2022-12-23 RX ADMIN — Medication 100 MILLIGRAM(S): at 22:15

## 2022-12-23 RX ADMIN — Medication 1 APPLICATION(S): at 05:47

## 2022-12-23 RX ADMIN — Medication 1 APPLICATION(S): at 01:05

## 2022-12-23 RX ADMIN — VALACYCLOVIR 1000 MILLIGRAM(S): 500 TABLET, FILM COATED ORAL at 15:28

## 2022-12-23 RX ADMIN — SENNA PLUS 2 TABLET(S): 8.6 TABLET ORAL at 22:54

## 2022-12-23 RX ADMIN — Medication 1 DROP(S): at 16:07

## 2022-12-23 RX ADMIN — Medication 1 APPLICATION(S): at 12:41

## 2022-12-23 RX ADMIN — Medication 100 MILLIGRAM(S): at 05:47

## 2022-12-23 NOTE — PROGRESS NOTE ADULT - SUBJECTIVE AND OBJECTIVE BOX
[facility]  INFECTIOUS DISEASES - Farhat Jones, [place] [address]  Tel: [phone]     Fax: [phone]  =======================================================    JAMISON, VERA [id number]    Follow up: No fevers. Denies any headache, facial/eye pain or blurring of vision.     Allergies:  No Known Allergies      Antibiotics:  acetaminophen     Tablet .. 650 milliGRAM(s) Oral every 6 hours PRN  aluminum hydroxide/magnesium hydroxide/simethicone Suspension 30 milliLiter(s) Oral every 4 hours PRN  artificial tears (preservative free) Ophthalmic Solution 1 Drop(s) Both EYES three times a day  bisacodyl Suppository 10 milliGRAM(s) Rectal daily PRN  cefTRIAXone   IVPB 1000 milliGRAM(s) IV Intermittent every 24 hours  cinacalcet 60 milliGRAM(s) Oral daily  dextrose 5% + sodium chloride 0.45%. 1000 milliLiter(s) IV Continuous <Continuous>  dextrose 5%. 1000 milliLiter(s) IV Continuous <Continuous>  dextrose 5%. 1000 milliLiter(s) IV Continuous <Continuous>  dextrose 50% Injectable 25 Gram(s) IV Push once  dextrose 50% Injectable 12.5 Gram(s) IV Push once  dextrose 50% Injectable 25 Gram(s) IV Push once  dextrose Oral Gel 15 Gram(s) Oral once PRN  diphenhydrAMINE 25 milliGRAM(s) Oral every 6 hours PRN  erythromycin   Ointment 1 Application(s) Left EYE every 4 hours  ferrous    sulfate Liquid 300 milliGRAM(s) Enteral Tube daily  glucagon  Injectable 1 milliGRAM(s) IntraMuscular once  heparin   Injectable 5000 Unit(s) SubCutaneous every 12 hours  insulin lispro (ADMELOG) corrective regimen sliding scale   SubCutaneous every 6 hours  lactobacillus acidophilus 1 Tablet(s) Oral daily  lithium 300 milliGRAM(s) Oral daily  magnesium hydroxide Suspension 30 milliLiter(s) Oral daily PRN  melatonin 3 milliGRAM(s) Oral at bedtime PRN  metroNIDAZOLE  IVPB 500 milliGRAM(s) IV Intermittent every 8 hours  metroNIDAZOLE  IVPB      ondansetron Injectable 4 milliGRAM(s) IV Push every 8 hours PRN  pantoprazole  Injectable 40 milliGRAM(s) IV Push daily  polyethylene glycol 3350 17 Gram(s) Oral daily PRN  senna 2 Tablet(s) Oral at bedtime  traMADol 50 milliGRAM(s) Oral four times a day PRN  valACYclovir 1000 milliGRAM(s) Oral every 8 hours  vancomycin  IVPB 1000 milliGRAM(s) IV Intermittent every 24 hours       REVIEW OF SYSTEMS:  *limited 2/2 patient cooperation*  CONSTITUTIONAL:  No Fever or chills  CARDIOVASCULAR:  No chest pain or SOB.  RESPIRATORY:  No cough, shortness of breath  GASTROINTESTINAL:  No abdominal pain  NEUROLOGIC:  No headache       Physical Exam:  ICU Vital Signs Last 24 Hrs  T(C): 36.8 (23 Dec 2022 11:46), Max: 37.2 (23 Dec 2022 04:47)  T(F): 98.2 (23 Dec 2022 11:46), Max: 98.9 (23 Dec 2022 04:47)  HR: 69 (23 Dec 2022 11:46) (69 - 73)  BP: 106/57 (23 Dec 2022 11:46) (106/57 - 121/66)  BP(mean): --  ABP: --  ABP(mean): --  RR: 17 (23 Dec 2022 11:46) (17 - 17)  SpO2: 96% (23 Dec 2022 11:46) (94% - 97%)    O2 Parameters below as of 23 Dec 2022 11:46  Patient On (Oxygen Delivery Method): room air     GEN: NAD  HEENT: (+) swelling and erythema around L eye slightly decreased overall, some vesicles/pustules noted on L side of forehead and near inner corner of eye  NECK: Supple.    LUNGS: Normal respiratory effort  HEART: Regular rate and rhythm   ABDOMEN: Soft, nontender, and nondistended.    EXTREMITIES: No leg edema.  NEUROLOGIC: AO x 2, Answering some questions    Labs:  12-23    137  |  105  |  18  ----------------------------<  104<H>  4.1   |  24  |  0.60    Ca    9.3      23 Dec 2022 06:35    TPro  7.0  /  Alb  2.4<L>  /  TBili  0.3  /  DBili  x   /  AST  28  /  ALT  25  /  AlkPhos  66  12-22                          9.2    7.42  )-----------( 414      ( 23 Dec 2022 06:35 )             29.7         LIVER FUNCTIONS - ( 22 Dec 2022 06:05 )  Alb: 2.4 g/dL / Pro: 7.0 g/dL / ALK PHOS: 66 U/L / ALT: 25 U/L / AST: 28 U/L / GGT: x             RECENT CULTURES:  12-21 @ 11:10 Clean Catch Clean Catch (Midstream)     <10,000 CFU/mL Normal Urogenital Joanie        12-20 @ 16:10 .Blood Blood-Peripheral     No growth to date.        12-20 @ 16:05 .Blood Blood-Peripheral     No growth to date.              All imaging and data are reviewed.

## 2022-12-23 NOTE — PHYSICAL THERAPY INITIAL EVALUATION ADULT - PERTINENT HX OF CURRENT PROBLEM, REHAB EVAL
82-year-old female with history of diabetes, anemia, bipolar disorder, delirium/psychosis, edema presents with his current long-term resident of [facility] brought in by EMS for left facial redness and swelling today. Admitted with facial cellulitis.

## 2022-12-23 NOTE — PROVIDER CONTACT NOTE (OTHER) - SITUATION
H/O left breast lumpectomy, no order/band placed on pt to not use this extremity. Is it ok to use Left arm?

## 2022-12-23 NOTE — PHYSICAL THERAPY INITIAL EVALUATION ADULT - ADDITIONAL COMMENTS
Patient is a poor historian. Per documentation, patient admitted from [facility] LTC and is dependent for ADLs and mobility at baseline.

## 2022-12-23 NOTE — PROVIDER CONTACT NOTE (OTHER) - ACTION/TREATMENT ORDERED:
Md Lcuas notified and made aware. Per MD if there is not documentation from MD Julio do not use then it is ok to use the extremity. Continue to monitor.

## 2022-12-23 NOTE — PROGRESS NOTE ADULT - SUBJECTIVE AND OBJECTIVE BOX
PROGRESS NOTE  Patient is a 82y old  Female who presents with a chief complaint of Cellulitis of face     (21 Dec 2022 13:55)  Chart and available morning labs /imaging are reviewed electronically , urgent issues addressed . More information  is being added upon completion of rounds , when more information is collected and management discussed with consultants , medical staff and social service/case management on the floor     OVERNIGHT  No new issues reported by medical staff . All above noted Patient is resting in a bed comfortably .Confused ,poor mentation .No distress noted   Patient is resting in a bed comfortably .Confused ,poor mentation .No distress noted No new issues reported by medical staff . All above noted   HPI:  82-year-old female with history of diabetes, anemia, bipolar disorder, delirium/psychosis, edema presents with his current long-term resident of [facility] brought in by EMS for left facial redness and swelling today.  No aggravating or alleviating factors otherwise noted.  No known trauma.  Patient unable to give history, no other acute complaints at this timeBipolar mood disorder     Breast cancer, left treated with surgery and RT, no lymph node dissection    Esophageal reflux     Hip pain, acute, right     Hypercalcemia.     PAST SURGICAL HISTORY:  S/P cataract extraction, unspecified laterality bilateral - 2016    S/P lumpectomy, left breast. < from: CT Orbit w/ IV Cont (12.20.22 @ 17:38) >    ACC: [id number] EXAM:  CT ORBITS IC                          PROCEDURE DATE:  12/20/2022          INTERPRETATION:  CLINICAL STATEMENT: Left facial infection. Rule out   orbital cellulitis    TECHNIQUE: CT of the orbits was performed with IV contrast.Coronal   reformat was obtained. 90 cc of Omnipaque 350 administered    COMPARISON: None.    FINDINGS:  There is soft tissue stranding of the fat in the left preseptal space.   The globes are intact. No inflammatory stranding in the left retrobulbar   fat/intraconal space.    There is no loculated fluid collection.    Small retention cyst/polyp right maxillary sinus    IMPRESSION:  Left preseptal cellulitis without evidence of orbital cellulitis    --- End of Report ---            MEGHAN RODRIGUEZ MD; Attending Radiologist  This document has been electronically signed. Dec 20 2022  7:20PM    < end of copied text >  < from: Xray Fluoro up to 1 Hr in OR (03.07.18 @ 12:49) >    EXAM:  FLOURO IN O.R. 1HR [id number]                                  PROCEDURE DATE:  03/07/2018          INTERPRETATION:  Imaging guidance was provided by the Department of   Radiology for a procedure performed by a physician from another clinical   department. This study was performed and will be interpreted by that   physician and therefore the department of radiology will not render an   interpretation of these images.    This report was generated by an  in   the department of radiology.                  DEPARTMENT RADIOLOGY   This document has been electronically signed. Mar  8 2018 10:09AM                < end of copied text >  < from: Xray Fluoro up to 1 Hr in OR (03.07.18 @ 12:49) >    EXAM:  FLOURO IN O.R. 1HR [id number]                                  PROCEDURE DATE:  03/07/2018          INTERPRETATION:  Imaging guidance was provided by the Department of   Radiology for a procedure performed by a physician from another clinical   department. This study was performed and will be interpreted by that   physician and therefore the department of radiology will not render an   interpretation of these images.    This report was generated by an  in   the department of radiology.                  DEPARTMENT RADIOLOGY   This document has been electronically signed. Mar  8 2018 10:09AM                < end of copied text >  < from: Xray Chest 1 View AP/PA (11.14.14 @ 17:19) >     EXAM:  CHEST 1 VIEW           PROCEDURE DATE:  11/14/2014      INTERPRETATION:  Clinical information: Hypercalcemia    No prior studies present for comparison  Portable study, 5:09 PM  Clear lungs. No sign of infiltrate effusion or congestive failure. Heart   size within normal limits. Aortic knob contains calcifications. Prominent   right paratracheal shadow likely related to tortuous brachiocephalic   vessels. Focal calcification present right humeral neck.  Surgical clips visible along lateral aspect of left chest wall.    IMPRESSION: No active disease.              PILI TESFAYE M.D.,ATTENDING RADIOLOGIST  This examination was interpreted on: Nov 14 2014  5:28P.  This document   has been electronically signed. Nov 14 2014  5:30P.          < end of copied text >   (20 Dec 2022 21:00)    PAST MEDICAL & SURGICAL HISTORY:  Bipolar mood disorder      Esophageal reflux      Hypercalcemia      Hip pain, acute, right      Breast cancer, left  treated with surgery and RT, no lymph node dissection      S/P cataract extraction, unspecified laterality  bilateral - 2016      S/P lumpectomy, left breast          MEDICATIONS  (STANDING):  artificial tears (preservative free) Ophthalmic Solution 1 Drop(s) Both EYES three times a day  cefTRIAXone   IVPB 1000 milliGRAM(s) IV Intermittent every 24 hours  cinacalcet 30 milliGRAM(s) Oral <User Schedule>  dextrose 5% + sodium chloride 0.45%. 1000 milliLiter(s) (50 mL/Hr) IV Continuous <Continuous>  dextrose 5%. 1000 milliLiter(s) (100 mL/Hr) IV Continuous <Continuous>  dextrose 5%. 1000 milliLiter(s) (50 mL/Hr) IV Continuous <Continuous>  dextrose 50% Injectable 25 Gram(s) IV Push once  dextrose 50% Injectable 12.5 Gram(s) IV Push once  dextrose 50% Injectable 25 Gram(s) IV Push once  erythromycin   Ointment 1 Application(s) Left EYE every 4 hours  ferrous    sulfate Liquid 300 milliGRAM(s) Enteral Tube daily  glucagon  Injectable 1 milliGRAM(s) IntraMuscular once  heparin   Injectable 5000 Unit(s) SubCutaneous every 12 hours  insulin lispro (ADMELOG) corrective regimen sliding scale   SubCutaneous every 6 hours  lactobacillus acidophilus 1 Tablet(s) Oral daily  lithium 300 milliGRAM(s) Oral daily  metroNIDAZOLE  IVPB      metroNIDAZOLE  IVPB 500 milliGRAM(s) IV Intermittent every 8 hours  pantoprazole  Injectable 40 milliGRAM(s) IV Push daily  senna 2 Tablet(s) Oral at bedtime  valACYclovir 1000 milliGRAM(s) Oral every 8 hours    MEDICATIONS  (PRN):  acetaminophen     Tablet .. 650 milliGRAM(s) Oral every 6 hours PRN Temp greater or equal to 38C (100.4F), Mild Pain (1 - 3)  aluminum hydroxide/magnesium hydroxide/simethicone Suspension 30 milliLiter(s) Oral every 4 hours PRN Dyspepsia  bisacodyl Suppository 10 milliGRAM(s) Rectal daily PRN Constipation  dextrose Oral Gel 15 Gram(s) Oral once PRN Blood Glucose LESS THAN 70 milliGRAM(s)/deciliter  diphenhydrAMINE 25 milliGRAM(s) Oral every 6 hours PRN Rash and/or Itching  magnesium hydroxide Suspension 30 milliLiter(s) Oral daily PRN Constipation  melatonin 3 milliGRAM(s) Oral at bedtime PRN Insomnia  ondansetron Injectable 4 milliGRAM(s) IV Push every 8 hours PRN Nausea and/or Vomiting  polyethylene glycol 3350 17 Gram(s) Oral daily PRN Constipation  traMADol 50 milliGRAM(s) Oral four times a day PRN Moderate Pain (4 - 6)      OBJECTIVE    T(C): 36.8 (12-23-22 @ 11:46), Max: 37.2 (12-23-22 @ 04:47)  HR: 69 (12-23-22 @ 11:46) (69 - 73)  BP: 106/57 (12-23-22 @ 11:46) (106/57 - 121/66)  RR: 17 (12-23-22 @ 11:46) (17 - 17)  SpO2: 96% (12-23-22 @ 11:46) (94% - 97%)  Wt(kg): --  I&O's Summary    22 Dec 2022 07:01  -  23 Dec 2022 07:00  --------------------------------------------------------  IN: 1080 mL / OUT: 900 mL / NET: 180 mL          REVIEW OF SYSTEMS:  CONSTITUTIONAL: No fever, weight loss, or fatigue  EYES: No eye pain, visual disturbances, or discharge  ENMT:   No sinus or throat pain  NECK: No pain or stiffness  RESPIRATORY: No cough, wheezing, chills or hemoptysis; No shortness of breath  CARDIOVASCULAR: No chest pain, palpitations, dizziness, or leg swelling  GASTROINTESTINAL: No abdominal pain. No nausea, vomiting; No diarrhea or constipation. No melena or hematochezia.  GENITOURINARY: No dysuria, frequency, hematuria, or incontinence  NEUROLOGICAL: No headaches, memory loss, loss of strength, numbness, or tremors  SKIN: No itching, burning, rashes, or lesions   MUSCULOSKELETAL: No joint pain or swelling; No muscle, back, or extremity pain    PHYSICAL EXAM:  Appearance: NAD. VS past 24 hrs -as above   HEENT:   Moist oral mucosa. Conjunctiva clear b/l.   Neck : supple  Respiratory: Lungs CTAB.  Gastrointestinal:  Soft, nontender. No rebound. No rigidity. BS present	  Cardiovascular: RRR ,S1S2 present  Neurologic: Non-focal. Moving all extremities.  Extremities: No edema. No erythema. No calf tenderness.  Skin: No rashes, No ecchymoses, No cyanosis.	  wounds ,skin lesions-See skin assesment flow sheet   LABS:                        9.2    7.42  )-----------( 414      ( 23 Dec 2022 06:35 )             29.7     12-23    137  |  105  |  18  ----------------------------<  104<H>  4.1   |  24  |  0.60    Ca    9.3      23 Dec 2022 06:35    TPro  7.0  /  Alb  2.4<L>  /  TBili  0.3  /  DBili  x   /  AST  28  /  ALT  25  /  AlkPhos  66  12-22    CAPILLARY BLOOD GLUCOSE      POCT Blood Glucose.: 130 mg/dL (23 Dec 2022 11:44)  POCT Blood Glucose.: 114 mg/dL (23 Dec 2022 06:26)  POCT Blood Glucose.: 141 mg/dL (23 Dec 2022 00:02)          Culture - Urine (collected 21 Dec 2022 11:10)  Source: Clean Catch Clean Catch (Midstream)  Final Report (22 Dec 2022 14:26):    <10,000 CFU/mL Normal Urogenital Joanie    Culture - Blood (collected 20 Dec 2022 16:10)  Source: .Blood Blood-Peripheral  Preliminary Report (22 Dec 2022 02:02):    No growth to date.    Culture - Blood (collected 20 Dec 2022 16:05)  Source: .Blood Blood-Peripheral  Preliminary Report (22 Dec 2022 02:02):    No growth to date.      RADIOLOGY & ADDITIONAL TESTS:   reviewed elctronically  ASSESSMENT/PLAN: 	    25 minutes aggregate time was spent on this visit, 50% visit time spent in care co-ordination with other attendings and counselling patient .I have discussed care plan with patient / HCP/family member ,who expressed understanding of problems treatment and their effect and side effects, alternatives in details. I have asked if they have any questions and concerns and appropriately addressed them to best of my ability.

## 2022-12-23 NOTE — PROGRESS NOTE ADULT - SUBJECTIVE AND OBJECTIVE BOX
Patient is a 82y Female with a known history of :  Facial cellulitis [L03.211]    GERD (gastroesophageal reflux disease) [K21.9]    Bipolar disorder [F31.9]    Prophylactic measure [Z29.9]    Chemosis, conjunctiva, left [H11.422]    DM (diabetes mellitus) [E11.9]    Hypercalcemia [E83.52]    Constipation [K59.00]      HPI:  82-year-old female with history of diabetes, anemia, bipolar disorder, delirium/psychosis, edema presents with his current long-term resident of [facility] brought in by EMS for left facial redness and swelling today.  No aggravating or alleviating factors otherwise noted.  No known trauma.  Patient unable to give history, no other acute complaints at this timeBipolar mood disorder     Breast cancer, left treated with surgery and RT, no lymph node dissection    Esophageal reflux     Hip pain, acute, right     Hypercalcemia.     PAST SURGICAL HISTORY:  S/P cataract extraction, unspecified laterality bilateral - 2016    S/P lumpectomy, left breast. < from: CT Orbit w/ IV Cont (12.20.22 @ 17:38) >    ACC: [id number] EXAM:  CT ORBITS IC                          PROCEDURE DATE:  12/20/2022          INTERPRETATION:  CLINICAL STATEMENT: Left facial infection. Rule out   orbital cellulitis    TECHNIQUE: CT of the orbits was performed with IV contrast.Coronal   reformat was obtained. 90 cc of Omnipaque 350 administered    COMPARISON: None.    FINDINGS:  There is soft tissue stranding of the fat in the left preseptal space.   The globes are intact. No inflammatory stranding in the left retrobulbar   fat/intraconal space.    There is no loculated fluid collection.    Small retention cyst/polyp right maxillary sinus    IMPRESSION:  Left preseptal cellulitis without evidence of orbital cellulitis    --- End of Report ---            MEGHAN RODRIGUEZ MD; Attending Radiologist  This document has been electronically signed. Dec 20 2022  7:20PM    < end of copied text >  < from: Xray Fluoro up to 1 Hr in OR (03.07.18 @ 12:49) >    EXAM:  FLOURO IN O.R. 1HR [id number]                                  PROCEDURE DATE:  03/07/2018          INTERPRETATION:  Imaging guidance was provided by the Department of   Radiology for a procedure performed by a physician from another clinical   department. This study was performed and will be interpreted by that   physician and therefore the department of radiology will not render an   interpretation of these images.    This report was generated by an  in   the department of radiology.                  DEPARTMENT RADIOLOGY   This document has been electronically signed. Mar  8 2018 10:09AM                < end of copied text >  < from: Xray Fluoro up to 1 Hr in OR (03.07.18 @ 12:49) >    EXAM:  FLOURO IN O.R. 1HR [id number]                                  PROCEDURE DATE:  03/07/2018          INTERPRETATION:  Imaging guidance was provided by the Department of   Radiology for a procedure performed by a physician from another clinical   department. This study was performed and will be interpreted by that   physician and therefore the department of radiology will not render an   interpretation of these images.    This report was generated by an  in   the department of radiology.                  DEPARTMENT RADIOLOGY   This document has been electronically signed. Mar  8 2018 10:09AM                < end of copied text >  < from: Xray Chest 1 View AP/PA (11.14.14 @ 17:19) >     EXAM:  CHEST 1 VIEW           PROCEDURE DATE:  11/14/2014      INTERPRETATION:  Clinical information: Hypercalcemia    No prior studies present for comparison  Portable study, 5:09 PM  Clear lungs. No sign of infiltrate effusion or congestive failure. Heart   size within normal limits. Aortic knob contains calcifications. Prominent   right paratracheal shadow likely related to tortuous brachiocephalic   vessels. Focal calcification present right humeral neck.  Surgical clips visible along lateral aspect of left chest wall.    IMPRESSION: No active disease.              PILI TESFAYE M.D.,ATTENDING RADIOLOGIST  This examination was interpreted on: Nov 14 2014  5:28P.  This document   has been electronically signed. Nov 14 2014  5:30P.          < end of copied text >   (20 Dec 2022 21:00)      REVIEW OF SYSTEMS:    CONSTITUTIONAL: No fever, weight loss, or fatigue  EYES: No eye pain, visual disturbances, or discharge  ENMT:  No difficulty hearing, tinnitus, vertigo; No sinus or throat pain  NECK: No pain or stiffness  BREASTS: No pain, masses, or nipple discharge  RESPIRATORY: No cough, wheezing, chills or hemoptysis; No shortness of breath  CARDIOVASCULAR: No chest pain, palpitations, dizziness, or leg swelling  GASTROINTESTINAL: No abdominal or epigastric pain. No nausea, vomiting, or hematemesis; No diarrhea or constipation. No melena or hematochezia.  GENITOURINARY: No dysuria, frequency, hematuria, or incontinence  NEUROLOGICAL: No headaches, memory loss, loss of strength, numbness, or tremors  SKIN: No itching, burning, rashes, or lesions   LYMPH NODES: No enlarged glands  ENDOCRINE: No heat or cold intolerance; No hair loss  MUSCULOSKELETAL: No joint pain or swelling; No muscle, back, or extremity pain  PSYCHIATRIC: No depression, anxiety, mood swings, or difficulty sleeping  HEME/LYMPH: No easy bruising, or bleeding gums  ALLERGY AND IMMUNOLOGIC: No hives or eczema    MEDICATIONS  (STANDING):  artificial tears (preservative free) Ophthalmic Solution 1 Drop(s) Both EYES three times a day  cefTRIAXone   IVPB 1000 milliGRAM(s) IV Intermittent every 24 hours  cinacalcet 60 milliGRAM(s) Oral daily  dextrose 5% + sodium chloride 0.45%. 1000 milliLiter(s) (50 mL/Hr) IV Continuous <Continuous>  dextrose 5%. 1000 milliLiter(s) (50 mL/Hr) IV Continuous <Continuous>  dextrose 5%. 1000 milliLiter(s) (100 mL/Hr) IV Continuous <Continuous>  dextrose 50% Injectable 25 Gram(s) IV Push once  dextrose 50% Injectable 12.5 Gram(s) IV Push once  dextrose 50% Injectable 25 Gram(s) IV Push once  erythromycin   Ointment 1 Application(s) Left EYE every 4 hours  ferrous    sulfate Liquid 300 milliGRAM(s) Enteral Tube daily  glucagon  Injectable 1 milliGRAM(s) IntraMuscular once  heparin   Injectable 5000 Unit(s) SubCutaneous every 12 hours  insulin lispro (ADMELOG) corrective regimen sliding scale   SubCutaneous every 6 hours  lactobacillus acidophilus 1 Tablet(s) Oral daily  lithium 300 milliGRAM(s) Oral daily  metroNIDAZOLE  IVPB      metroNIDAZOLE  IVPB 500 milliGRAM(s) IV Intermittent every 8 hours  pantoprazole  Injectable 40 milliGRAM(s) IV Push daily  senna 2 Tablet(s) Oral at bedtime  vancomycin  IVPB 1000 milliGRAM(s) IV Intermittent every 24 hours    MEDICATIONS  (PRN):  acetaminophen     Tablet .. 650 milliGRAM(s) Oral every 6 hours PRN Temp greater or equal to 38C (100.4F), Mild Pain (1 - 3)  aluminum hydroxide/magnesium hydroxide/simethicone Suspension 30 milliLiter(s) Oral every 4 hours PRN Dyspepsia  bisacodyl Suppository 10 milliGRAM(s) Rectal daily PRN Constipation  dextrose Oral Gel 15 Gram(s) Oral once PRN Blood Glucose LESS THAN 70 milliGRAM(s)/deciliter  diphenhydrAMINE 25 milliGRAM(s) Oral every 6 hours PRN Rash and/or Itching  magnesium hydroxide Suspension 30 milliLiter(s) Oral daily PRN Constipation  melatonin 3 milliGRAM(s) Oral at bedtime PRN Insomnia  ondansetron Injectable 4 milliGRAM(s) IV Push every 8 hours PRN Nausea and/or Vomiting  polyethylene glycol 3350 17 Gram(s) Oral daily PRN Constipation  traMADol 50 milliGRAM(s) Oral four times a day PRN Moderate Pain (4 - 6)      ALLERGIES: No Known Allergies      FAMILY HISTORY:  No pertinent family history in first degree relatives        PHYSICAL EXAMINATION:  -----------------------------  T(C): 37.6 (12-22-22 @ 04:34), Max: 37.7 (12-21-22 @ 19:51)  HR: 66 (12-22-22 @ 04:34) (66 - 70)  BP: 128/57 (12-22-22 @ 04:34) (108/59 - 128/57)  RR: 16 (12-22-22 @ 04:34) (16 - 16)  SpO2: 95% (12-22-22 @ 04:34) (95% - 97%)  Wt(kg): --    12-21 @ 07:01  -  12-22 @ 07:00  --------------------------------------------------------  IN:    dextrose 5% + sodium chloride 0.45%: 550 mL    IV PiggyBack: 100 mL    Jevity 1.5: 390 mL  Total IN: 1040 mL    OUT:    Voided (mL): 950 mL  Total OUT: 950 mL    Total NET: 90 mL            VITALS  T(C): 37.6 (12-22-22 @ 04:34), Max: 37.7 (12-21-22 @ 19:51)  HR: 66 (12-22-22 @ 04:34) (66 - 70)  BP: 128/57 (12-22-22 @ 04:34) (108/59 - 128/57)  RR: 16 (12-22-22 @ 04:34) (16 - 16)  SpO2: 95% (12-22-22 @ 04:34) (95% - 97%)    Constitutional: well developed, normal appearance, well groomed, well nourished, no deformities and no acute distress.   Eyes: the conjunctiva exhibited no abnormalities and the eyelids demonstrated no xanthelasmas.   HEENT: normal oral mucosa, no oral pallor and no oral cyanosis.   Neck: normal jugular venous A waves present, normal jugular venous V waves present and no jugular venous cullen A waves.   Pulmonary: no respiratory distress, normal respiratory rhythm and effort, no accessory muscle use and lungs were clear to auscultation bilaterally.   Cardiovascular: heart rate and rhythm were normal, normal S1 and S2 and no murmur, gallop, rub, heave or thrill are present.   Abdomen: soft, non-tender, no hepato-splenomegaly and no abdominal mass palpated.   Musculoskeletal: the gait could not be assessed..   Extremities: no clubbing of the fingernails, no localized cyanosis, no petechial hemorrhages and no ischemic changes.   Skin: normal skin color and pigmentation, no rash, no venous stasis, no skin lesions, no skin ulcer and no xanthoma was observed.   Psychiatric: oriented to person, place, and time, the affect was normal, the mood was normal and not feeling anxious.     LABS:   --------  12-21    137  |  105  |  18  ----------------------------<  120<H>  4.2   |  26  |  0.71    Ca    9.9      21 Dec 2022 07:35  Mg     2.2     12-21    TPro  7.6  /  Alb  2.4<L>  /  TBili  0.2  /  DBili  x   /  AST  28  /  ALT  29  /  AlkPhos  71  12-20                         9.7    7.21  )-----------( 410      ( 21 Dec 2022 07:35 )             31.3     PT/INR - ( 21 Dec 2022 07:35 )   PT: 13.4 sec;   INR: 1.14 ratio               172 mg/dL, --, 45 mg/dL<L>, 112 mg/dL    Culture Results:   No growth to date. (12-20 @ 16:10)  Culture Results:   No growth to date. (12-20 @ 16:05)      RADIOLOGY:  -----------------    ECG:     ECHO:

## 2022-12-23 NOTE — PROGRESS NOTE ADULT - ASSESSMENT
82-year-old female with history of diabetes, anemia, bipolar disorder, delirium/psychosis, edema presents with his current long-term resident of [facility] brought in by EMS for left facial redness and swelling today.  No aggravating or alleviating factors otherwise noted.  No known trauma.  Patient unable to give history, no other acute complaints at this timeBipolar mood disorder     Breast cancer, left treated with surgery and RT, no lymph node dissection    Esophageal reflux     Hip pain, acute, right     Hypercalcemia.     PAST SURGICAL HISTORY:  S/P cataract extraction, unspecified laterality bilateral - 2016    S/P lumpectomy, left breast. < from: CT Orbit w/ IV Cont (12.20.22 @ 17:38) >    ACC: [id number] EXAM:  CT ORBITS IC                          PROCEDURE DATE:  12/20/2022          INTERPRETATION:  CLINICAL STATEMENT: Left facial infection. Rule out   orbital cellulitis    TECHNIQUE: CT of the orbits was performed with IV contrast.Coronal   reformat was obtained. 90 cc of Omnipaque 350 administered    COMPARISON: None.    FINDINGS:  There is soft tissue stranding of the fat in the left preseptal space.   The globes are intact. No inflammatory stranding in the left retrobulbar   fat/intraconal space.    There is no loculated fluid collection.    Small retention cyst/polyp right maxillary sinus    IMPRESSION:  Left preseptal cellulitis without evidence of orbital cellulitis    --- End of Report ---            MEGHAN RODRIGUEZ MD; Attending Radiologist  This document has been electronically signed. Dec 20 2022  7:20PM    < end of copied text >  < from: Xray Fluoro up to 1 Hr in OR (03.07.18 @ 12:49) >    EXAM:  FLOURO IN O.R. 1HR [id number]                                  PROCEDURE DATE:  03/07/2018          INTERPRETATION:  Imaging guidance was provided by the Department of   Radiology for a procedure performed by a physician from another clinical   department. This study was performed and will be interpreted by that   physician and therefore the department of radiology will not render an   interpretation of these images.    This report was generated by an  in   the department of radiology.                  DEPARTMENT RADIOLOGY   This document has been electronically signed. Mar  8 2018 10:09AM                < end of copied text >  < from: Xray Fluoro up to 1 Hr in OR (03.07.18 @ 12:49) >    EXAM:  FLOURO IN O.R. 1HR [id number]                                  PROCEDURE DATE:  03/07/2018          INTERPRETATION:  Imaging guidance was provided by the Department of   Radiology for a procedure performed by a physician from another clinical   department. This study was performed and will be interpreted by that   physician and therefore the department of radiology will not render an   interpretation of these images.    This report was generated by an  in   the department of radiology.                  DEPARTMENT RADIOLOGY   This document has been electronically signed. Mar  8 2018 10:09AM                < end of copied text >  < from: Xray Chest 1 View AP/PA (11.14.14 @ 17:19) >     EXAM:  CHEST 1 VIEW           PROCEDURE DATE:  11/14/2014      INTERPRETATION:  Clinical information: Hypercalcemia    No prior studies present for comparison  Portable study, 5:09 PM  Clear lungs. No sign of infiltrate effusion or congestive failure. Heart   size within normal limits. Aortic knob contains calcifications. Prominent   right paratracheal shadow likely related to tortuous brachiocephalic   vessels. Focal calcification present right humeral neck.  Surgical clips visible along lateral aspect of left chest wall.    IMPRESSION: No active disease.              PILI TESFAYE M.D.,ATTENDING RADIOLOGIST  This examination was interpreted on: Nov 14 2014  5:28P.  This document   has been electronically signed. Nov 14 2014  5:30P.          < end of copied text >   (20 Dec 2022 21:00)  hyperparathyroidism decrease sensipar 30 mg po q o d   will check pth   ca  phos   continue with sensipar

## 2022-12-23 NOTE — PHYSICAL THERAPY INITIAL EVALUATION ADULT - ASSISTIVE DEVICE:SIT/SUPINE, REHAB EVAL
Disclaimer:  This note has been generated using voice recognition software.  There may be type of graft focal areas that have been missed during a proof reading      Subjective:      Patient ID: Jean Pierre Paulson is a 38 y.o. male.    Chief Complaint: Foot Pain (right)      Pain  This is a chronic problem. The current episode started more than 1 year ago. The problem occurs daily. The problem has been unchanged. Associated symptoms include arthralgias. Pertinent negatives include no change in bowel habit, chest pain, chills, coughing, diaphoresis, fatigue, neck pain, rash, sore throat, vertigo or vomiting.     Review of Systems   Constitutional: Negative for activity change, appetite change, chills, diaphoresis and fatigue.   HENT: Negative for drooling, ear discharge, ear pain, facial swelling, nosebleeds, sore throat, trouble swallowing, voice change and goiter.    Eyes: Negative for photophobia, pain, discharge, redness and visual disturbance.   Respiratory: Negative for apnea, cough, choking, chest tightness, shortness of breath, wheezing and stridor.    Cardiovascular: Negative for chest pain, palpitations and leg swelling.   Gastrointestinal: Negative for abdominal distention, change in bowel habit, diarrhea, rectal pain, vomiting, fecal incontinence and change in bowel habit.   Endocrine: Negative for cold intolerance, heat intolerance, polydipsia, polyphagia and polyuria.   Genitourinary: Negative for bladder incontinence, dysuria, flank pain and frequency.   Musculoskeletal: Positive for arthralgias, back pain and leg pain. Negative for neck pain and neck stiffness.   Integumentary:  Negative for color change, pallor and rash.   Neurological: Negative for dizziness, vertigo, seizures, syncope, facial asymmetry, speech difficulty, light-headedness, disturbances in coordination, memory loss and coordination difficulties.   Hematological: Negative for adenopathy. Does not bruise/bleed easily.  "  Psychiatric/Behavioral: Negative for agitation, behavioral problems, confusion, decreased concentration, dysphoric mood, hallucinations, self-injury and suicidal ideas. The patient is not nervous/anxious and is not hyperactive.             Objective:  Vitals:    01/03/22 1156 01/03/22 1157   BP: (!) 152/90    Pulse: 95    Resp: 18    Weight: (!) 154.2 kg (340 lb)    Height: 6' 2" (1.88 m)    PainSc:   7   7         Physical Exam  Vitals and nursing note reviewed. Exam conducted with a chaperone present.   Constitutional:       General: He is awake.      Appearance: Normal appearance. He is not ill-appearing or diaphoretic.   HENT:      Head: Normocephalic and atraumatic.      Nose: Nose normal.      Mouth/Throat:      Mouth: Mucous membranes are moist.      Pharynx: Oropharynx is clear.   Eyes:      Conjunctiva/sclera: Conjunctivae normal.      Pupils: Pupils are equal, round, and reactive to light.   Cardiovascular:      Rate and Rhythm: Normal rate.   Pulmonary:      Effort: Pulmonary effort is normal. No respiratory distress.   Abdominal:      Palpations: Abdomen is soft.      Tenderness: There is no guarding.   Musculoskeletal:         General: Normal range of motion.      Cervical back: Normal range of motion and neck supple. No rigidity.   Skin:     General: Skin is warm and dry.      Coloration: Skin is not jaundiced or pale.   Neurological:      General: No focal deficit present.      Mental Status: He is alert and oriented to person, place, and time. Mental status is at baseline.      Cranial Nerves: Cranial nerves are intact. No cranial nerve deficit (II-XII).   Psychiatric:         Mood and Affect: Mood normal.         Behavior: Behavior normal. Behavior is cooperative.         Thought Content: Thought content normal.           No orders of the defined types were placed in this encounter.       US Guided Vascular Access  Narrative: EXAMINATION:  US GUIDED VASCULAR ACCESS    CLINICAL HISTORY:  picc line " insertion;    TECHNIQUE:  US GUIDED VASCULAR ACCESS    COMPARISON:  Comparisons were reviewed, if available.    FINDINGS:  Procedure performed by Filippo CUELLO under the supervision of Dr. Neely.    5 [place] PICC line placed in basilic vein in left upper extremity. Informed consent obtained including risks and possible complications. Patient pepped with chloro prep and draped in a sterile fashion. 3 cc 1% lidocaine infused. Utilizing U/S guidance a 55 cm 5 [place]amese PICC line placed and position verified by fluoroscopy. PICC line flushed with heparinized saline.    Statlock and bandage applied. Patient tolerated procedure well with no immediate complication.  Impression: As above.    Electronically signed by: Anjel Neely  Date:    10/22/2021  Time:    13:06  IR PICC Line Placement w/o Port w/ Img > 4 Y/O  Narrative: EXAMINATION:  IR PICC LINE PLACEMENT W/O PORT, W/IMG > 4 Y/O    CLINICAL HISTORY:  PICC line for long term IV antibiotics;    TECHNIQUE:  IR PICC LINE PLACEMENT W/O PORT, W/IMG > 4 Y/O    COMPARISON:  Comparisons were reviewed, if available.    FINDINGS:  Procedure performed by Filippo CUELLO under the supervision of Dr. Neely.    5 [place]amese PICC line placed in basilic vein in left upper extremity. Informed consent obtained including risks and possible complications. Patient pepped with chloro prep and draped in a sterile fashion. 3 cc 1% lidocaine infused. Utilizing U/S guidance a 55 cm 5 [place]amese PICC line placed and position verified by fluoroscopy. PICC line flushed with heparinized saline.    Statlock and bandage applied. Patient tolerated procedure well with no immediate complication.  Impression: As above.    Electronically signed by: Anjel Neely  Date:    10/22/2021  Time:    13:05       Lab Requisition on 11/15/2021   Component Date Value Ref Range Status    Sodium 11/15/2021 139  136 - 145 mmol/L Final    Potassium 11/15/2021 4.4  3.5 - 5.1 mmol/L Final    Chloride 11/15/2021 104   98 - 107 mmol/L Final    CO2 11/15/2021 27  21 - 32 mmol/L Final    Anion Gap 11/15/2021 12  7 - 16 mmol/L Final    Glucose 11/15/2021 115* 74 - 106 mg/dL Final    BUN 11/15/2021 30* 7 - 18 mg/dL Final    Creatinine 11/15/2021 1.27  0.70 - 1.30 mg/dL Final    BUN/Creatinine Ratio 11/15/2021 24* 6 - 20 Final    Calcium 11/15/2021 8.8  8.5 - 10.1 mg/dL Final    eGFR  11/15/2021 82  >=60 mL/min/1.73m² Final    ESR Westergren 11/15/2021 10  0 - 15 mm/Hr Final    CRP 11/15/2021 3.29* 0.00 - 0.80 mg/dL Final    WBC 11/15/2021 4.52  4.50 - 11.00 K/uL Final    RBC 11/15/2021 5.87  4.60 - 6.20 M/uL Final    Hemoglobin 11/15/2021 15.9  13.5 - 18.0 g/dL Final    Hematocrit 11/15/2021 46.0  40.0 - 54.0 % Final    MCV 11/15/2021 78.4* 80.0 - 96.0 fL Final    MCH 11/15/2021 27.1  27.0 - 31.0 pg Final    MCHC 11/15/2021 34.6  32.0 - 36.0 g/dL Final    RDW 11/15/2021 14.8* 11.5 - 14.5 % Final    Platelet Count 11/15/2021 167  150 - 400 K/uL Final    MPV 11/15/2021 10.6  9.4 - 12.4 fL Final    Neutrophils % 11/15/2021 58.5  53.0 - 65.0 % Final    Lymphocytes % 11/15/2021 28.5  27.0 - 41.0 % Final    Neutrophils, Abs 11/15/2021 2.64  1.80 - 7.70 K/uL Final    Lymphocytes, Absolute 11/15/2021 1.29  1.00 - 4.80 K/uL Final    Diff Type 11/15/2021 Auto   Final    Monocytes % 11/15/2021 8.8* 2.0 - 6.0 % Final    Eosinophils % 11/15/2021 3.8  1.0 - 4.0 % Final    Basophils % 11/15/2021 0.4  0.0 - 1.0 % Final    Monocytes, Absolute 11/15/2021 0.40  0.00 - 0.80 K/uL Final    Eosinophils, Absolute 11/15/2021 0.17  0.00 - 0.50 K/uL Final    Basophils, Absolute 11/15/2021 0.02  0.00 - 0.20 K/uL Final   Nutrition on 11/12/2021   Component Date Value Ref Range Status    Hemoglobin A1C 11/12/2021 8.4* 4.5 - 6.6 % Final    Estimated Average Glucose 11/12/2021 194  mg/dL Final   Lab Requisition on 11/08/2021   Component Date Value Ref Range Status    Sodium 11/08/2021 138  136 - 145 mmol/L Final     Potassium 11/08/2021 4.4  3.5 - 5.1 mmol/L Final    Chloride 11/08/2021 101  98 - 107 mmol/L Final    CO2 11/08/2021 29  21 - 32 mmol/L Final    Anion Gap 11/08/2021 12  7 - 16 mmol/L Final    Glucose 11/08/2021 199* 74 - 106 mg/dL Final    BUN 11/08/2021 33* 7 - 18 mg/dL Final    Creatinine 11/08/2021 1.38* 0.70 - 1.30 mg/dL Final    BUN/Creatinine Ratio 11/08/2021 24* 6 - 20 Final    Calcium 11/08/2021 8.9  8.5 - 10.1 mg/dL Final    eGFR  11/08/2021 74  >=60 mL/min/1.73m² Final    CRP 11/08/2021 1.34* 0.00 - 0.80 mg/dL Final    ESR Westergren 11/08/2021 85* 0 - 15 mm/Hr Final    WBC 11/08/2021 6.61  4.50 - 11.00 K/uL Final    RBC 11/08/2021 4.50* 4.60 - 6.20 M/uL Final    Hemoglobin 11/08/2021 12.0* 13.5 - 18.0 g/dL Final    Hematocrit 11/08/2021 35.8* 40.0 - 54.0 % Final    MCV 11/08/2021 79.6* 80.0 - 96.0 fL Final    MCH 11/08/2021 26.7* 27.0 - 31.0 pg Final    MCHC 11/08/2021 33.5  32.0 - 36.0 g/dL Final    RDW 11/08/2021 14.4  11.5 - 14.5 % Final    Platelet Count 11/08/2021 214  150 - 400 K/uL Final    MPV 11/08/2021 10.4  9.4 - 12.4 fL Final    Neutrophils % 11/08/2021 59.2  53.0 - 65.0 % Final    Lymphocytes % 11/08/2021 25.1* 27.0 - 41.0 % Final    Neutrophils, Abs 11/08/2021 3.91  1.80 - 7.70 K/uL Final    Lymphocytes, Absolute 11/08/2021 1.66  1.00 - 4.80 K/uL Final    Diff Type 11/08/2021 Auto   Final    Monocytes % 11/08/2021 10.4* 2.0 - 6.0 % Final    Eosinophils % 11/08/2021 4.7* 1.0 - 4.0 % Final    Basophils % 11/08/2021 0.6  0.0 - 1.0 % Final    Monocytes, Absolute 11/08/2021 0.69  0.00 - 0.80 K/uL Final    Eosinophils, Absolute 11/08/2021 0.31  0.00 - 0.50 K/uL Final    Basophils, Absolute 11/08/2021 0.04  0.00 - 0.20 K/uL Final   Office Visit on 11/02/2021   Component Date Value Ref Range Status    POC Amphetamines 11/02/2021 Negative  Negative, Inconclusive Final    POC Barbiturates 11/02/2021 Negative  Negative, Inconclusive Final    POC  Benzodiazepines 11/02/2021 Negative  Negative, Inconclusive Final    POC Cocaine 11/02/2021 Negative  Negative, Inconclusive Final    POC THC 11/02/2021 Negative  Negative, Inconclusive Final    POC Methadone 11/02/2021 Negative  Negative, Inconclusive Final    POC Methamphetamine 11/02/2021 Negative  Negative, Inconclusive Final    POC Opiates 11/02/2021 Presumptive Positive* Negative, Inconclusive Final    POC Oxycodone 11/02/2021 Negative  Negative, Inconclusive Final    POC Phencyclidine 11/02/2021 Negative  Negative, Inconclusive Final    POC Methylenedioxymethamphetamine * 11/02/2021 Negative  Negative, Inconclusive Final    POC Tricyclic Antidepressants 11/02/2021 Negative  Negative, Inconclusive Final    POC Buprenorphine 11/02/2021 Negative   Final     Acceptable 11/02/2021 Yes   Final    POC Temperature (Urine) 11/02/2021 94   Final   Lab Requisition on 11/02/2021   Component Date Value Ref Range Status    Sodium 11/02/2021 138  136 - 145 mmol/L Final    Potassium 11/02/2021 4.3  3.5 - 5.1 mmol/L Final    Chloride 11/02/2021 103  98 - 107 mmol/L Final    CO2 11/02/2021 28  21 - 32 mmol/L Final    Anion Gap 11/02/2021 11  7 - 16 mmol/L Final    Glucose 11/02/2021 274* 74 - 106 mg/dL Final    BUN 11/02/2021 21* 7 - 18 mg/dL Final    Creatinine 11/02/2021 1.40* 0.70 - 1.30 mg/dL Final    BUN/Creatinine Ratio 11/02/2021 15  6 - 20 Final    Calcium 11/02/2021 8.7  8.5 - 10.1 mg/dL Final    eGFR  11/02/2021 73  >=60 mL/min/1.73m² Final    CRP 11/02/2021 1.47* 0.00 - 0.80 mg/dL Final    ESR Westergren 11/02/2021 50* 0 - 15 mm/Hr Final    WBC 11/02/2021 4.93  4.50 - 11.00 K/uL Final    RBC 11/02/2021 4.46* 4.60 - 6.20 M/uL Final    Hemoglobin 11/02/2021 12.2* 13.5 - 18.0 g/dL Final    Hematocrit 11/02/2021 35.6* 40.0 - 54.0 % Final    MCV 11/02/2021 79.8* 80.0 - 96.0 fL Final    MCH 11/02/2021 27.4  27.0 - 31.0 pg Final    MCHC 11/02/2021 34.3  32.0 -  36.0 g/dL Final    RDW 11/02/2021 14.7* 11.5 - 14.5 % Final    Platelet Count 11/02/2021 242  150 - 400 K/uL Final    MPV 11/02/2021 10.6  9.4 - 12.4 fL Final    Neutrophils % 11/02/2021 43.8* 53.0 - 65.0 % Final    Lymphocytes % 11/02/2021 38.1  27.0 - 41.0 % Final    Neutrophils, Abs 11/02/2021 2.16  1.80 - 7.70 K/uL Final    Lymphocytes, Absolute 11/02/2021 1.88  1.00 - 4.80 K/uL Final    Diff Type 11/02/2021 Auto   Final    Monocytes % 11/02/2021 11.4* 2.0 - 6.0 % Final    Eosinophils % 11/02/2021 6.1* 1.0 - 4.0 % Final    Basophils % 11/02/2021 0.6  0.0 - 1.0 % Final    Monocytes, Absolute 11/02/2021 0.56  0.00 - 0.80 K/uL Final    Eosinophils, Absolute 11/02/2021 0.30  0.00 - 0.50 K/uL Final    Basophils, Absolute 11/02/2021 0.03  0.00 - 0.20 K/uL Final   No results displayed because visit has over 200 results.      Office Visit on 10/08/2021   Component Date Value Ref Range Status    Influenza A 10/08/2021 Negative  Negative, Invalid Final    Influenza B 10/08/2021 Negative  Negative, Invalid Final    COVID-19 Ag 10/08/2021 Negative  Negative, Invalid Final    Culture, Anaerobe 10/08/2021 Prevotella bivia*  Final    Culture, Wound/Abscess 10/08/2021 Light Growth Pseudomonas aeruginosa*  Final    Final Diagnosis 10/08/2021    Final                    Value:This result contains rich text formatting which cannot be displayed here.    Gross Description 10/08/2021    Final                    Value:This result contains rich text formatting which cannot be displayed here.    Microscopic Description 10/08/2021    Final                    Value:This result contains rich text formatting which cannot be displayed here.    Clinical Information 10/08/2021    Final                    Value:This result contains rich text formatting which cannot be displayed here.    Laboratory Notes 10/08/2021    Final                    Value:This result contains rich text formatting which cannot be displayed here.    Office Visit on 07/13/2021   Component Date Value Ref Range Status    POC Amphetamines 07/13/2021 Negative  Negative, Inconclusive Final    POC Barbiturates 07/13/2021 Negative  Negative, Inconclusive Final    POC Benzodiazepines 07/13/2021 Negative  Negative, Inconclusive Final    POC Cocaine 07/13/2021 Negative  Negative, Inconclusive Final    POC THC 07/13/2021 Negative  Negative, Inconclusive Final    POC Methadone 07/13/2021 Negative  Negative, Inconclusive Final    POC Methamphetamine 07/13/2021 Negative  Negative, Inconclusive Final    POC Opiates 07/13/2021 Presumptive Positive* Negative, Inconclusive Final    POC Oxycodone 07/13/2021 Negative  Negative, Inconclusive Final    POC Phencyclidine 07/13/2021 Negative  Negative, Inconclusive Final    POC Methylenedioxymethamphetamine * 07/13/2021 Negative  Negative, Inconclusive Final    POC Tricyclic Antidepressants 07/13/2021 Negative  Negative, Inconclusive Final    POC Buprenorphine 07/13/2021 Negative   Final     Acceptable 07/13/2021 Yes   Final    POC Temperature (Urine) 07/13/2021 92   Final           Assessment:      1. Diabetic neuropathy with neurologic complication    2. Ulcer of right heel, with necrosis of bone    3. Peripheral vascular disease, unspecified            A's of Opioid Risk Assessment  Activity:Patient can perform ADL.   Analgesia:Patients pain is partially controlled by current medication. Patient has tried OTC medications such as Tylenol and Ibuprofen with out relief.   Adverse Effects: Patient denies constipation or sedation.  Aberrant Behavior:  reviewed with no aberrant drug seeking/taking behavior.  Overdose reversal drug naloxone discussed    Drug screen reviewed      Requested Prescriptions     Signed Prescriptions Disp Refills    gabapentin (NEURONTIN) 300 MG capsule 90 capsule 1     Sig: Take 1 capsule (300 mg total) by mouth every 8 (eight) hours.    HYDROcodone-acetaminophen (NORCO)   mg per tablet 30 tablet 0     Sig: Take 1 tablet by mouth once daily.    HYDROcodone-acetaminophen (NORCO)  mg per tablet 30 tablet 0     Sig: Take 1 tablet by mouth once daily.         Plan:    Uses crutches for ambulation     Lower extremity discomfort right greater than left    He states current medications helping control his discomfort    He would like to continue with conservative management    Continue activity as directed    Follow-up 2 months,     Dr. Paulson, May 2022    Bring original prescription medication bottles/container/box with labels to each visit   bed rails

## 2022-12-23 NOTE — PROGRESS NOTE ADULT - ASSESSMENT
82-year-old female with history of diabetes, anemia, bipolar disorder, delirium/psychosis, edema presents with his current long-term resident of [facility] brought in by EMS for left facial redness and swelling today.  No aggravating or alleviating factors otherwise noted.  No known trauma.  Patient unable to give history, no other acute complaints at this time .ER physician Discussed with ophthalmology, Dr. Barrios, sent l eye  photos, she states is likely just chemosis, continue frequent lubrication, erythromycin ointment every 4 hours. Septic workup sent and ID consult called Palliative care consult requested ,to discuss advance directives and complete MOLST

## 2022-12-23 NOTE — PROGRESS NOTE ADULT - ASSESSMENT
82-year-old female with history of diabetes, anemia, bipolar disorder, delirium/psychosis, edema, from [facility], who presented with left facial redness and swelling. She was initially being treated for left preseptal cellulitis, but there is also concern for herpes zoster given new vesicular/pustular lesions noted on 12/22. Per Dr. Julio ER discussed case with Ophthalmology.    Erythema/swelling and lesions appear less overall. She denies any pain or blurring of vision. No fever leukocytosis. Blood cultures remain no growth.    1. Herpes zoster  -continue Valacyclovir 1g PO TID to complete 10 days  -recommend follow up with Ophthalmology  -no need for contact isolation per Infection Control    2. Left preseptal cellulitis  -continue ceftriaxone and Flagyl--if improving can switch soon to Augmentin  -continue vancomycin pending MRSA nasal PCR, discontinue if negative  -follow MRSA nasal PCR, blood cultures    Shahla Apple MD  Division of Infectious Diseases   Cell [phone] between 8am and 6pm   After 6pm and weekends please call ID service at [phone].    82-year-old female with history of diabetes, anemia, bipolar disorder, delirium/psychosis, edema, from [facility], who presented with left facial redness and swelling. She was initially being treated for left preseptal cellulitis, but there is also concern for herpes zoster given new vesicular/pustular lesions noted on 12/22. Per Dr. Julio ER discussed case with Ophthalmology.    Erythema/swelling and lesions appear less overall. She denies any pain or blurring of vision. No fever leukocytosis. Blood cultures remain no growth.    1. Herpes zoster  -continue Valacyclovir 1g PO TID to complete 10 days  -denies any visual symptoms or pain, but recommend close follow up with Ophthalmology  -no need for contact isolation per Infection Control    2. Left preseptal cellulitis  -continue ceftriaxone and Flagyl--can probably switch tomorrow to Augmentin for 7 more days  -continue vancomycin pending MRSA nasal PCR, discontinue if negative  -follow MRSA nasal PCR, blood cultures    Shahla Apple MD  Division of Infectious Diseases   Cell [phone] between 8am and 6pm   After 6pm and weekends please call ID service at [phone].

## 2022-12-23 NOTE — PROGRESS NOTE ADULT - SUBJECTIVE AND OBJECTIVE BOX
Patient is a 82y Female whom presented to the hospital with high pth     PAST MEDICAL & SURGICAL HISTORY:  Bipolar mood disorder      Esophageal reflux      Hypercalcemia      Hip pain, acute, right      Breast cancer, left  treated with surgery and RT, no lymph node dissection      S/P cataract extraction, unspecified laterality  bilateral - 2016      S/P lumpectomy, left breast          MEDICATIONS  (STANDING):  artificial tears (preservative free) Ophthalmic Solution 1 Drop(s) Both EYES three times a day  cefTRIAXone   IVPB 1000 milliGRAM(s) IV Intermittent every 24 hours  cinacalcet 60 milliGRAM(s) Oral daily  dextrose 5% + sodium chloride 0.45%. 1000 milliLiter(s) (50 mL/Hr) IV Continuous <Continuous>  dextrose 5%. 1000 milliLiter(s) (100 mL/Hr) IV Continuous <Continuous>  dextrose 5%. 1000 milliLiter(s) (50 mL/Hr) IV Continuous <Continuous>  dextrose 50% Injectable 25 Gram(s) IV Push once  dextrose 50% Injectable 12.5 Gram(s) IV Push once  dextrose 50% Injectable 25 Gram(s) IV Push once  erythromycin   Ointment 1 Application(s) Left EYE every 4 hours  ferrous    sulfate Liquid 300 milliGRAM(s) Enteral Tube daily  glucagon  Injectable 1 milliGRAM(s) IntraMuscular once  heparin   Injectable 5000 Unit(s) SubCutaneous every 12 hours  insulin lispro (ADMELOG) corrective regimen sliding scale   SubCutaneous every 6 hours  lactobacillus acidophilus 1 Tablet(s) Oral daily  lithium 300 milliGRAM(s) Oral daily  metroNIDAZOLE  IVPB 500 milliGRAM(s) IV Intermittent every 8 hours  metroNIDAZOLE  IVPB      pantoprazole    Tablet 40 milliGRAM(s) Oral before breakfast  senna 2 Tablet(s) Oral at bedtime  vancomycin  IVPB 1000 milliGRAM(s) IV Intermittent every 24 hours      Allergies    No Known Allergies    Intolerances        SOCIAL HISTORY:  Denies ETOh,Smoking,     FAMILY HISTORY:  No pertinent family history in first degree relatives        REVIEW OF SYSTEMS:    CONSTITUTIONAL: No weakness, fevers or chills  RESPIRATORY: No cough, wheezing, hemoptysis; No shortness of breath  CARDIOVASCULAR: No chest pain or palpitations  GASTROINTESTINAL: No abdominal or epigastric pain. No nausea, vomiting,     No diarrhea or constipation. No melena   GENITOURINARY: No dysuria, frequency or hematuria  NEUROLOGICAL: No numbness or weakness  SKIN: dry      VITAL:  T(C): , Max: 36.9 (12-21-22 @ 12:33)  T(F): , Max: 98.5 (22 @ 12:33)  HR: 70 (22 @ 12:33)  BP: 108/59 (22 @ 12:33)  BP(mean): --  RR: 16 (22 @ 12:33)  SpO2: 95% (22 @ 12:33)  Wt(kg): --    I and O's:     @ 07:01  -   @ 18:29  --------------------------------------------------------  IN: 0 mL / OUT: 300 mL / NET: -300 mL          PHYSICAL EXAM:    Constitutional: NAD  HEENT: conjunctive   clear   Neck:  No JVD  Respiratory: CTAB  Cardiovascular: S1 and S2  Gastrointestinal: BS+, soft, NT/ND  Extremities: No peripheral edema  Skin: dry  Access: Not applicable    LABS:                        9.7    7.21  )-----------( 410      ( 21 Dec 2022 07:35 )             31.3     12-    137  |  105  |  18  ----------------------------<  120<H>  4.2   |  26  |  0.71    Ca    9.9      21 Dec 2022 07:35  Mg     2.2     -    TPro  7.6  /  Alb  2.4<L>  /  TBili  0.2  /  DBili  x   /  AST  28  /  ALT  29  /  AlkPhos  71  12-20      Urine Studies:  Urinalysis Basic - ( 21 Dec 2022 11:10 )    Color: Yellow / Appearance: Clear / S.010 / pH: x  Gluc: x / Ketone: Negative  / Bili: Negative / Urobili: Negative   Blood: x / Protein: 30 mg/dL / Nitrite: Negative   Leuk Esterase: Trace / RBC: 0-2 /HPF / WBC 3-5   Sq Epi: x / Non Sq Epi: Occasional / Bacteria: Occasional            RADIOLOGY & ADDITIONAL STUDIES:

## 2022-12-24 ENCOUNTER — TRANSCRIPTION ENCOUNTER (OUTPATIENT)
Age: 82
End: 2022-12-24

## 2022-12-24 ENCOUNTER — INPATIENT (INPATIENT)
Facility: HOSPITAL | Age: 82
LOS: 3 days | Discharge: NURSING, REHAB OR LONG-TERM CARE | DRG: 603 | End: 2022-12-28
Attending: INTERNAL MEDICINE | Admitting: INTERNAL MEDICINE
Payer: MEDICARE

## 2022-12-24 VITALS
OXYGEN SATURATION: 97 % | TEMPERATURE: 98 F | RESPIRATION RATE: 20 BRPM | WEIGHT: 169.98 LBS | SYSTOLIC BLOOD PRESSURE: 120 MMHG | DIASTOLIC BLOOD PRESSURE: 76 MMHG | HEIGHT: 67 IN | HEART RATE: 72 BPM

## 2022-12-24 VITALS
SYSTOLIC BLOOD PRESSURE: 101 MMHG | HEART RATE: 70 BPM | DIASTOLIC BLOOD PRESSURE: 60 MMHG | TEMPERATURE: 99 F | RESPIRATION RATE: 17 BRPM | OXYGEN SATURATION: 95 %

## 2022-12-24 DIAGNOSIS — Z98.49 CATARACT EXTRACTION STATUS, UNSPECIFIED EYE: Chronic | ICD-10-CM

## 2022-12-24 DIAGNOSIS — Z98.890 OTHER SPECIFIED POSTPROCEDURAL STATES: Chronic | ICD-10-CM

## 2022-12-24 LAB
ANION GAP SERPL CALC-SCNC: 8 MMOL/L — SIGNIFICANT CHANGE UP (ref 5–17)
BUN SERPL-MCNC: 18 MG/DL — SIGNIFICANT CHANGE UP (ref 7–23)
CALCIUM SERPL-MCNC: 9.4 MG/DL — SIGNIFICANT CHANGE UP (ref 8.5–10.1)
CHLORIDE SERPL-SCNC: 106 MMOL/L — SIGNIFICANT CHANGE UP (ref 96–108)
CO2 SERPL-SCNC: 24 MMOL/L — SIGNIFICANT CHANGE UP (ref 22–31)
CREAT SERPL-MCNC: 0.61 MG/DL — SIGNIFICANT CHANGE UP (ref 0.5–1.3)
EGFR: 89 ML/MIN/1.73M2 — SIGNIFICANT CHANGE UP
GLUCOSE SERPL-MCNC: 118 MG/DL — HIGH (ref 70–99)
HCT VFR BLD CALC: 32.8 % — LOW (ref 34.5–45)
HGB BLD-MCNC: 9.9 G/DL — LOW (ref 11.5–15.5)
MCHC RBC-ENTMCNC: 29.2 PG — SIGNIFICANT CHANGE UP (ref 27–34)
MCHC RBC-ENTMCNC: 30.2 GM/DL — LOW (ref 32–36)
MCV RBC AUTO: 96.8 FL — SIGNIFICANT CHANGE UP (ref 80–100)
NRBC # BLD: 0 /100 WBCS — SIGNIFICANT CHANGE UP (ref 0–0)
PLATELET # BLD AUTO: 340 K/UL — SIGNIFICANT CHANGE UP (ref 150–400)
POTASSIUM SERPL-MCNC: 3.9 MMOL/L — SIGNIFICANT CHANGE UP (ref 3.5–5.3)
POTASSIUM SERPL-SCNC: 3.9 MMOL/L — SIGNIFICANT CHANGE UP (ref 3.5–5.3)
RBC # BLD: 3.39 M/UL — LOW (ref 3.8–5.2)
RBC # FLD: 15.6 % — HIGH (ref 10.3–14.5)
SARS-COV-2 RNA SPEC QL NAA+PROBE: SIGNIFICANT CHANGE UP
SODIUM SERPL-SCNC: 138 MMOL/L — SIGNIFICANT CHANGE UP (ref 135–145)
WBC # BLD: 8.49 K/UL — SIGNIFICANT CHANGE UP (ref 3.8–10.5)
WBC # FLD AUTO: 8.49 K/UL — SIGNIFICANT CHANGE UP (ref 3.8–10.5)

## 2022-12-24 PROCEDURE — 85027 COMPLETE CBC AUTOMATED: CPT

## 2022-12-24 PROCEDURE — 71045 X-RAY EXAM CHEST 1 VIEW: CPT

## 2022-12-24 PROCEDURE — 36415 COLL VENOUS BLD VENIPUNCTURE: CPT

## 2022-12-24 PROCEDURE — 97162 PT EVAL MOD COMPLEX 30 MIN: CPT

## 2022-12-24 PROCEDURE — 85025 COMPLETE CBC W/AUTO DIFF WBC: CPT

## 2022-12-24 PROCEDURE — 84484 ASSAY OF TROPONIN QUANT: CPT

## 2022-12-24 PROCEDURE — 80048 BASIC METABOLIC PNL TOTAL CA: CPT

## 2022-12-24 PROCEDURE — 87640 STAPH A DNA AMP PROBE: CPT

## 2022-12-24 PROCEDURE — 87086 URINE CULTURE/COLONY COUNT: CPT

## 2022-12-24 PROCEDURE — 96374 THER/PROPH/DIAG INJ IV PUSH: CPT

## 2022-12-24 PROCEDURE — 83735 ASSAY OF MAGNESIUM: CPT

## 2022-12-24 PROCEDURE — 83550 IRON BINDING TEST: CPT

## 2022-12-24 PROCEDURE — 87641 MR-STAPH DNA AMP PROBE: CPT

## 2022-12-24 PROCEDURE — 87635 SARS-COV-2 COVID-19 AMP PRB: CPT

## 2022-12-24 PROCEDURE — 82962 GLUCOSE BLOOD TEST: CPT

## 2022-12-24 PROCEDURE — 80061 LIPID PANEL: CPT

## 2022-12-24 PROCEDURE — 80178 ASSAY OF LITHIUM: CPT

## 2022-12-24 PROCEDURE — 82140 ASSAY OF AMMONIA: CPT

## 2022-12-24 PROCEDURE — 83970 ASSAY OF PARATHORMONE: CPT

## 2022-12-24 PROCEDURE — 83036 HEMOGLOBIN GLYCOSYLATED A1C: CPT

## 2022-12-24 PROCEDURE — 99233 SBSQ HOSP IP/OBS HIGH 50: CPT

## 2022-12-24 PROCEDURE — 83690 ASSAY OF LIPASE: CPT

## 2022-12-24 PROCEDURE — 80053 COMPREHEN METABOLIC PANEL: CPT

## 2022-12-24 PROCEDURE — 87040 BLOOD CULTURE FOR BACTERIA: CPT

## 2022-12-24 PROCEDURE — 82310 ASSAY OF CALCIUM: CPT

## 2022-12-24 PROCEDURE — 70481 CT ORBIT/EAR/FOSSA W/DYE: CPT | Mod: MA

## 2022-12-24 PROCEDURE — 87637 SARSCOV2&INF A&B&RSV AMP PRB: CPT

## 2022-12-24 PROCEDURE — 81001 URINALYSIS AUTO W/SCOPE: CPT

## 2022-12-24 PROCEDURE — 84550 ASSAY OF BLOOD/URIC ACID: CPT

## 2022-12-24 PROCEDURE — 80202 ASSAY OF VANCOMYCIN: CPT

## 2022-12-24 PROCEDURE — 84443 ASSAY THYROID STIM HORMONE: CPT

## 2022-12-24 PROCEDURE — 83540 ASSAY OF IRON: CPT

## 2022-12-24 PROCEDURE — 99285 EMERGENCY DEPT VISIT HI MDM: CPT | Mod: 25

## 2022-12-24 PROCEDURE — 85610 PROTHROMBIN TIME: CPT

## 2022-12-24 PROCEDURE — 93005 ELECTROCARDIOGRAM TRACING: CPT

## 2022-12-24 PROCEDURE — 99285 EMERGENCY DEPT VISIT HI MDM: CPT | Mod: GC

## 2022-12-24 RX ORDER — CEFTRIAXONE 500 MG/1
1000 INJECTION, POWDER, FOR SOLUTION INTRAMUSCULAR; INTRAVENOUS
Qty: 0 | Refills: 0 | DISCHARGE
Start: 2022-12-24

## 2022-12-24 RX ORDER — ACETAMINOPHEN 500 MG
2 TABLET ORAL
Qty: 0 | Refills: 0 | DISCHARGE
Start: 2022-12-24

## 2022-12-24 RX ORDER — FERROUS SULFATE 325(65) MG
1 TABLET ORAL
Qty: 0 | Refills: 0 | DISCHARGE

## 2022-12-24 RX ORDER — HEPARIN SODIUM 5000 [USP'U]/ML
5000 INJECTION INTRAVENOUS; SUBCUTANEOUS
Qty: 0 | Refills: 0 | DISCHARGE
Start: 2022-12-24

## 2022-12-24 RX ORDER — DIPHENHYDRAMINE HCL 50 MG
1 CAPSULE ORAL
Qty: 0 | Refills: 0 | DISCHARGE
Start: 2022-12-24

## 2022-12-24 RX ORDER — SENNA PLUS 8.6 MG/1
2 TABLET ORAL
Qty: 0 | Refills: 0 | DISCHARGE
Start: 2022-12-24

## 2022-12-24 RX ORDER — LANOLIN ALCOHOL/MO/W.PET/CERES
1 CREAM (GRAM) TOPICAL
Qty: 0 | Refills: 0 | DISCHARGE
Start: 2022-12-24

## 2022-12-24 RX ORDER — ERYTHROMYCIN BASE 5 MG/GRAM
1 OINTMENT (GRAM) OPHTHALMIC (EYE)
Qty: 0 | Refills: 0 | DISCHARGE
Start: 2022-12-24

## 2022-12-24 RX ORDER — VALACYCLOVIR 500 MG/1
1 TABLET, FILM COATED ORAL
Qty: 0 | Refills: 0 | DISCHARGE
Start: 2022-12-24

## 2022-12-24 RX ORDER — INSULIN LISPRO 100/ML
0 VIAL (ML) SUBCUTANEOUS
Qty: 0 | Refills: 0 | DISCHARGE
Start: 2022-12-24

## 2022-12-24 RX ORDER — LITHIUM CARBONATE 300 MG/1
1 TABLET, EXTENDED RELEASE ORAL
Qty: 0 | Refills: 0 | DISCHARGE
Start: 2022-12-24

## 2022-12-24 RX ORDER — TRAMADOL HYDROCHLORIDE 50 MG/1
1 TABLET ORAL
Qty: 0 | Refills: 0 | DISCHARGE
Start: 2022-12-24

## 2022-12-24 RX ORDER — LACTOBACILLUS ACIDOPHILUS 100MM CELL
2 CAPSULE ORAL
Qty: 0 | Refills: 0 | DISCHARGE
Start: 2022-12-24

## 2022-12-24 RX ORDER — FERROUS SULFATE 325(65) MG
5 TABLET ORAL
Qty: 0 | Refills: 0 | DISCHARGE
Start: 2022-12-24

## 2022-12-24 RX ORDER — METRONIDAZOLE 500 MG
500 TABLET ORAL
Qty: 0 | Refills: 0 | DISCHARGE
Start: 2022-12-24

## 2022-12-24 RX ORDER — CINACALCET 30 MG/1
1 TABLET, FILM COATED ORAL
Qty: 0 | Refills: 0 | DISCHARGE
Start: 2022-12-24

## 2022-12-24 RX ORDER — MAGNESIUM HYDROXIDE 400 MG/1
30 TABLET, CHEWABLE ORAL
Qty: 0 | Refills: 0 | DISCHARGE
Start: 2022-12-24

## 2022-12-24 RX ORDER — CINACALCET 30 MG/1
1 TABLET, FILM COATED ORAL
Qty: 0 | Refills: 0 | DISCHARGE

## 2022-12-24 RX ADMIN — Medication 300 MILLIGRAM(S): at 12:23

## 2022-12-24 RX ADMIN — VALACYCLOVIR 1000 MILLIGRAM(S): 500 TABLET, FILM COATED ORAL at 06:27

## 2022-12-24 RX ADMIN — Medication 1 TABLET(S): at 17:43

## 2022-12-24 RX ADMIN — Medication 100 MILLIGRAM(S): at 06:26

## 2022-12-24 RX ADMIN — Medication 1 APPLICATION(S): at 06:37

## 2022-12-24 RX ADMIN — LITHIUM CARBONATE 300 MILLIGRAM(S): 300 TABLET, EXTENDED RELEASE ORAL at 12:23

## 2022-12-24 RX ADMIN — PANTOPRAZOLE SODIUM 40 MILLIGRAM(S): 20 TABLET, DELAYED RELEASE ORAL at 12:22

## 2022-12-24 RX ADMIN — HEPARIN SODIUM 5000 UNIT(S): 5000 INJECTION INTRAVENOUS; SUBCUTANEOUS at 06:27

## 2022-12-24 RX ADMIN — Medication 1 TABLET(S): at 12:23

## 2022-12-24 RX ADMIN — Medication 1 DROP(S): at 17:42

## 2022-12-24 RX ADMIN — HEPARIN SODIUM 5000 UNIT(S): 5000 INJECTION INTRAVENOUS; SUBCUTANEOUS at 17:42

## 2022-12-24 RX ADMIN — Medication 1 APPLICATION(S): at 12:18

## 2022-12-24 RX ADMIN — Medication 1 DROP(S): at 06:27

## 2022-12-24 RX ADMIN — VALACYCLOVIR 1000 MILLIGRAM(S): 500 TABLET, FILM COATED ORAL at 15:24

## 2022-12-24 RX ADMIN — Medication 1 APPLICATION(S): at 15:24

## 2022-12-24 RX ADMIN — Medication 1 APPLICATION(S): at 01:10

## 2022-12-24 RX ADMIN — Medication 1 APPLICATION(S): at 17:42

## 2022-12-24 NOTE — DISCHARGE NOTE PROVIDER - CARE PROVIDER_API CALL
Christina Baltazar)  Internal Medicine  [address] [place] [address]  Phone: [phone]  Fax: [phone]  Follow Up Time: 1-3 days    Shahla Apple)  Infectious Disease; Internal Medicine  [address] [place] [address]  Phone: [phone]  Fax: [phone]  Follow Up Time: 1 week

## 2022-12-24 NOTE — PROGRESS NOTE ADULT - PROBLEM SELECTOR PLAN 7
continue home medications -lithium
bowel regimen
continue home medications -lithium
continue home medications -lithium

## 2022-12-24 NOTE — ED PROVIDER NOTE - CLINICAL SUMMARY MEDICAL DECISION MAKING FREE TEXT BOX
Pt is transferred to be seen by opthalmology. Pt is already being treated with antibiotics and antivirals. Dispo based on optho and Dr. Hartley plan. Pt is transferred to be seen by opthalmology. Pt is already being treated with antibiotics and antivirals. Dispo based on optho and Dr. Hartley plan. Rash consistnet w/ shingles. Will consult ophtho, and likely transfer back to [place] for continuity of care.

## 2022-12-24 NOTE — DISCHARGE NOTE PROVIDER - NSDCMRMEDTOKEN_GEN_ALL_CORE_FT
acetaminophen 325 mg oral tablet: 2 tab(s) orally every 6 hours, As needed, Temp greater or equal to 38C (100.4F), Mild Pain (1 - 3)  aspirin 81 mg oral delayed release tablet: 2 tab(s) orally for 42 days total post-operatively stop on 4/17  bisacodyl 10 mg rectal suppository: 1 suppository(ies) rectal once a day, As needed, Constipation  celecoxib 200 mg oral capsule: 1 cap(s) orally 2 times a day (with meals) for 21 days post-operatively for HO prophylaxis stop on 3/28  cinacalcet 30 mg oral tablet: 1 tab(s) orally   diphenhydrAMINE 25 mg oral capsule: 1 cap(s) orally every 6 hours, As needed, Rash and/or Itching  docusate sodium 100 mg oral capsule: 1 cap(s) orally 3 times a day  erythromycin 0.5% ophthalmic ointment: 1 application to each affected eye every 4 hours  ferrous sulfate 300 mg/5 mL (60 mg/5 mL elemental iron) oral liquid: 5 milliliter(s) orally once a day  heparin: 5000 unit(s) subcutaneous 2 times a day  insulin lispro 100 units/mL injectable solution: injectable 3 times a day (before meals)sliding scale   lactobacillus acidophilus oral tablet: 2 tab(s) orally once a day  lithium 300 mg oral tablet: 1 tab(s) orally once a day  magnesium hydroxide 8% oral suspension: 30 milliliter(s) orally once a day, As needed, Constipation  melatonin 3 mg oral tablet: 1 tab(s) orally once a day (at bedtime), As needed, Insomnia  metroNIDAZOLE 500 mg/100 mL intravenous solution: 500 milligram(s) intravenous every 8 hours  ocular lubricant ophthalmic solution: 1 drop(s) to each affected eye 3 times a day  oxyCODONE 5 mg oral tablet: 1 tab(s) orally every 4 hours, As Needed for mild hip pain. 2 tabs for severe pain  pantoprazole 40 mg oral delayed release tablet: 1 tab(s) orally once a day  polyethylene glycol 3350 oral powder for reconstitution: 17 gram(s) orally once a day, As needed, Constipation  senna leaf extract oral tablet: 2 tab(s) orally once a day (at bedtime)  traMADol 50 mg oral tablet: 1 tab(s) orally 4 times a day, As needed, Moderate Pain (4 - 6)  valACYclovir 1 g oral tablet: 1 tab(s) orally every 8 hours   acetaminophen 325 mg oral tablet: 2 tab(s) orally every 6 hours, As needed, Temp greater or equal to 38C (100.4F), Mild Pain (1 - 3)  bisacodyl 10 mg rectal suppository: 1 suppository(ies) rectal once a day, As needed, Constipation  cefTRIAXone: 1000 milligram(s) intravenous once a day  cinacalcet 30 mg oral tablet: 1 tab(s) orally once a day  diphenhydrAMINE 25 mg oral capsule: 1 cap(s) orally every 6 hours, As needed, Rash and/or Itching  erythromycin 0.5% ophthalmic ointment: 1 application to each affected eye every 4 hours  ferrous sulfate 300 mg/5 mL (60 mg/5 mL elemental iron) oral liquid: 5 milliliter(s) orally once a day  heparin: 5000 unit(s) subcutaneous 2 times a day  insulin lispro 100 units/mL injectable solution: injectable 3 times a day (before meals)sliding scale   lactobacillus acidophilus oral tablet: 2 tab(s) orally once a day  lithium 300 mg oral tablet: 1 tab(s) orally once a day  magnesium hydroxide 8% oral suspension: 30 milliliter(s) orally once a day, As needed, Constipation  melatonin 3 mg oral tablet: 1 tab(s) orally once a day (at bedtime), As needed, Insomnia  metroNIDAZOLE 500 mg/100 mL intravenous solution: 500 milligram(s) intravenous every 8 hours  ocular lubricant ophthalmic solution: 1 drop(s) to each affected eye 3 times a day  pantoprazole 40 mg oral delayed release tablet: 1 tab(s) orally once a day  polyethylene glycol 3350 oral powder for reconstitution: 17 gram(s) orally once a day, As needed, Constipation  senna leaf extract oral tablet: 2 tab(s) orally once a day (at bedtime)  traMADol 50 mg oral tablet: 1 tab(s) orally 4 times a day, As needed, Moderate Pain (4 - 6)  valACYclovir 1 g oral tablet: 1 tab(s) orally every 8 hours

## 2022-12-24 NOTE — PROGRESS NOTE ADULT - PROBLEM SELECTOR PLAN 8
bowel regimen
bowel regimen
Gastrointestinal stress ulcer prophylaxis and DVT prophylaxis administered
bowel regimen

## 2022-12-24 NOTE — PROGRESS NOTE ADULT - ASSESSMENT
82-year-old female with history of diabetes, anemia, bipolar disorder, delirium/psychosis, edema presents with his current long-term resident of [facility] brought in by EMS for left facial redness and swelling today.  No aggravating or alleviating factors otherwise noted.  No known trauma.  Patient unable to give history, no other acute complaints at this timeBipolar mood disorder     Breast cancer, left treated with surgery and RT, no lymph node dissection    Esophageal reflux     Hip pain, acute, right     Hypercalcemia.     PAST SURGICAL HISTORY:  S/P cataract extraction, unspecified laterality bilateral - 2016    S/P lumpectomy, left breast. < from: CT Orbit w/ IV Cont (12.20.22 @ 17:38) >    ACC: [id number] EXAM:  CT ORBITS IC                          PROCEDURE DATE:  12/20/2022          INTERPRETATION:  CLINICAL STATEMENT: Left facial infection. Rule out   orbital cellulitis    TECHNIQUE: CT of the orbits was performed with IV contrast.Coronal   reformat was obtained. 90 cc of Omnipaque 350 administered    COMPARISON: None.    FINDINGS:  There is soft tissue stranding of the fat in the left preseptal space.   The globes are intact. No inflammatory stranding in the left retrobulbar   fat/intraconal space.    There is no loculated fluid collection.    Small retention cyst/polyp right maxillary sinus    IMPRESSION:  Left preseptal cellulitis without evidence of orbital cellulitis    --- End of Report ---            MEGHAN RODRIGUEZ MD; Attending Radiologist  This document has been electronically signed. Dec 20 2022  7:20PM    < end of copied text >  < from: Xray Fluoro up to 1 Hr in OR (03.07.18 @ 12:49) >    EXAM:  FLOURO IN O.R. 1HR [id number]                                  PROCEDURE DATE:  03/07/2018          INTERPRETATION:  Imaging guidance was provided by the Department of   Radiology for a procedure performed by a physician from another clinical   department. This study was performed and will be interpreted by that   physician and therefore the department of radiology will not render an   interpretation of these images.    This report was generated by an  in   the department of radiology.                  DEPARTMENT RADIOLOGY   This document has been electronically signed. Mar  8 2018 10:09AM                < end of copied text >  < from: Xray Fluoro up to 1 Hr in OR (03.07.18 @ 12:49) >    EXAM:  FLOURO IN O.R. 1HR [id number]                                  PROCEDURE DATE:  03/07/2018          INTERPRETATION:  Imaging guidance was provided by the Department of   Radiology for a procedure performed by a physician from another clinical   department. This study was performed and will be interpreted by that   physician and therefore the department of radiology will not render an   interpretation of these images.    This report was generated by an  in   the department of radiology.                  DEPARTMENT RADIOLOGY   This document has been electronically signed. Mar  8 2018 10:09AM                < end of copied text >  < from: Xray Chest 1 View AP/PA (11.14.14 @ 17:19) >     EXAM:  CHEST 1 VIEW           PROCEDURE DATE:  11/14/2014      INTERPRETATION:  Clinical information: Hypercalcemia    No prior studies present for comparison  Portable study, 5:09 PM  Clear lungs. No sign of infiltrate effusion or congestive failure. Heart   size within normal limits. Aortic knob contains calcifications. Prominent   right paratracheal shadow likely related to tortuous brachiocephalic   vessels. Focal calcification present right humeral neck.  Surgical clips visible along lateral aspect of left chest wall.    IMPRESSION: No active disease.              PILI TESFAYE M.D.,ATTENDING RADIOLOGIST  This examination was interpreted on: Nov 14 2014  5:28P.  This document   has been electronically signed. Nov 14 2014  5:30P.          < end of copied text >   (20 Dec 2022 21:00)  hyperparathyroidism decrease sensipar 30 mg po q o d   will check pth   ca  phos   continue with sensipar

## 2022-12-24 NOTE — PROGRESS NOTE ADULT - SUBJECTIVE AND OBJECTIVE BOX
Patient is a 82y Female with a known history of :  Facial cellulitis [L03.211]    GERD (gastroesophageal reflux disease) [K21.9]    Bipolar disorder [F31.9]    Prophylactic measure [Z29.9]    Chemosis, conjunctiva, left [H11.422]    DM (diabetes mellitus) [E11.9]    Hypercalcemia [E83.52]    Constipation [K59.00]      HPI:  82-year-old female with history of diabetes, anemia, bipolar disorder, delirium/psychosis, edema presents with his current long-term resident of [facility] brought in by EMS for left facial redness and swelling today.  No aggravating or alleviating factors otherwise noted.  No known trauma.  Patient unable to give history, no other acute complaints at this timeBipolar mood disorder     Breast cancer, left treated with surgery and RT, no lymph node dissection    Esophageal reflux     Hip pain, acute, right     Hypercalcemia.     PAST SURGICAL HISTORY:  S/P cataract extraction, unspecified laterality bilateral - 2016    S/P lumpectomy, left breast. < from: CT Orbit w/ IV Cont (12.20.22 @ 17:38) >    ACC: [id number] EXAM:  CT ORBITS IC                          PROCEDURE DATE:  12/20/2022          INTERPRETATION:  CLINICAL STATEMENT: Left facial infection. Rule out   orbital cellulitis    TECHNIQUE: CT of the orbits was performed with IV contrast.Coronal   reformat was obtained. 90 cc of Omnipaque 350 administered    COMPARISON: None.    FINDINGS:  There is soft tissue stranding of the fat in the left preseptal space.   The globes are intact. No inflammatory stranding in the left retrobulbar   fat/intraconal space.    There is no loculated fluid collection.    Small retention cyst/polyp right maxillary sinus    IMPRESSION:  Left preseptal cellulitis without evidence of orbital cellulitis    --- End of Report ---            MEGHAN RODRIGUEZ MD; Attending Radiologist  This document has been electronically signed. Dec 20 2022  7:20PM    < end of copied text >  < from: Xray Fluoro up to 1 Hr in OR (03.07.18 @ 12:49) >    EXAM:  FLOURO IN O.R. 1HR [id number]                                  PROCEDURE DATE:  03/07/2018          INTERPRETATION:  Imaging guidance was provided by the Department of   Radiology for a procedure performed by a physician from another clinical   department. This study was performed and will be interpreted by that   physician and therefore the department of radiology will not render an   interpretation of these images.    This report was generated by an  in   the department of radiology.                  DEPARTMENT RADIOLOGY   This document has been electronically signed. Mar  8 2018 10:09AM                < end of copied text >  < from: Xray Fluoro up to 1 Hr in OR (03.07.18 @ 12:49) >    EXAM:  FLOURO IN O.R. 1HR [id number]                                  PROCEDURE DATE:  03/07/2018          INTERPRETATION:  Imaging guidance was provided by the Department of   Radiology for a procedure performed by a physician from another clinical   department. This study was performed and will be interpreted by that   physician and therefore the department of radiology will not render an   interpretation of these images.    This report was generated by an  in   the department of radiology.                  DEPARTMENT RADIOLOGY   This document has been electronically signed. Mar  8 2018 10:09AM                < end of copied text >  < from: Xray Chest 1 View AP/PA (11.14.14 @ 17:19) >     EXAM:  CHEST 1 VIEW           PROCEDURE DATE:  11/14/2014      INTERPRETATION:  Clinical information: Hypercalcemia    No prior studies present for comparison  Portable study, 5:09 PM  Clear lungs. No sign of infiltrate effusion or congestive failure. Heart   size within normal limits. Aortic knob contains calcifications. Prominent   right paratracheal shadow likely related to tortuous brachiocephalic   vessels. Focal calcification present right humeral neck.  Surgical clips visible along lateral aspect of left chest wall.    IMPRESSION: No active disease.              PILI TESFAYE M.D.,ATTENDING RADIOLOGIST  This examination was interpreted on: Nov 14 2014  5:28P.  This document   has been electronically signed. Nov 14 2014  5:30P.          < end of copied text >   (20 Dec 2022 21:00)      REVIEW OF SYSTEMS:    CONSTITUTIONAL: No fever, weight loss, or fatigue  EYES: No eye pain, visual disturbances, or discharge  ENMT:  No difficulty hearing, tinnitus, vertigo; No sinus or throat pain  NECK: No pain or stiffness  BREASTS: No pain, masses, or nipple discharge  RESPIRATORY: No cough, wheezing, chills or hemoptysis; No shortness of breath  CARDIOVASCULAR: No chest pain, palpitations, dizziness, or leg swelling  GASTROINTESTINAL: No abdominal or epigastric pain. No nausea, vomiting, or hematemesis; No diarrhea or constipation. No melena or hematochezia.  GENITOURINARY: No dysuria, frequency, hematuria, or incontinence  NEUROLOGICAL: No headaches, memory loss, loss of strength, numbness, or tremors  SKIN: No itching, burning, rashes, or lesions   LYMPH NODES: No enlarged glands  ENDOCRINE: No heat or cold intolerance; No hair loss  MUSCULOSKELETAL: No joint pain or swelling; No muscle, back, or extremity pain  PSYCHIATRIC: No depression, anxiety, mood swings, or difficulty sleeping  HEME/LYMPH: No easy bruising, or bleeding gums  ALLERGY AND IMMUNOLOGIC: No hives or eczema    MEDICATIONS  (STANDING):  artificial tears (preservative free) Ophthalmic Solution 1 Drop(s) Both EYES three times a day  cefTRIAXone   IVPB 1000 milliGRAM(s) IV Intermittent every 24 hours  cinacalcet 60 milliGRAM(s) Oral daily  dextrose 5% + sodium chloride 0.45%. 1000 milliLiter(s) (50 mL/Hr) IV Continuous <Continuous>  dextrose 5%. 1000 milliLiter(s) (50 mL/Hr) IV Continuous <Continuous>  dextrose 5%. 1000 milliLiter(s) (100 mL/Hr) IV Continuous <Continuous>  dextrose 50% Injectable 25 Gram(s) IV Push once  dextrose 50% Injectable 12.5 Gram(s) IV Push once  dextrose 50% Injectable 25 Gram(s) IV Push once  erythromycin   Ointment 1 Application(s) Left EYE every 4 hours  ferrous    sulfate Liquid 300 milliGRAM(s) Enteral Tube daily  glucagon  Injectable 1 milliGRAM(s) IntraMuscular once  heparin   Injectable 5000 Unit(s) SubCutaneous every 12 hours  insulin lispro (ADMELOG) corrective regimen sliding scale   SubCutaneous every 6 hours  lactobacillus acidophilus 1 Tablet(s) Oral daily  lithium 300 milliGRAM(s) Oral daily  metroNIDAZOLE  IVPB      metroNIDAZOLE  IVPB 500 milliGRAM(s) IV Intermittent every 8 hours  pantoprazole  Injectable 40 milliGRAM(s) IV Push daily  senna 2 Tablet(s) Oral at bedtime  vancomycin  IVPB 1000 milliGRAM(s) IV Intermittent every 24 hours    MEDICATIONS  (PRN):  acetaminophen     Tablet .. 650 milliGRAM(s) Oral every 6 hours PRN Temp greater or equal to 38C (100.4F), Mild Pain (1 - 3)  aluminum hydroxide/magnesium hydroxide/simethicone Suspension 30 milliLiter(s) Oral every 4 hours PRN Dyspepsia  bisacodyl Suppository 10 milliGRAM(s) Rectal daily PRN Constipation  dextrose Oral Gel 15 Gram(s) Oral once PRN Blood Glucose LESS THAN 70 milliGRAM(s)/deciliter  diphenhydrAMINE 25 milliGRAM(s) Oral every 6 hours PRN Rash and/or Itching  magnesium hydroxide Suspension 30 milliLiter(s) Oral daily PRN Constipation  melatonin 3 milliGRAM(s) Oral at bedtime PRN Insomnia  ondansetron Injectable 4 milliGRAM(s) IV Push every 8 hours PRN Nausea and/or Vomiting  polyethylene glycol 3350 17 Gram(s) Oral daily PRN Constipation  traMADol 50 milliGRAM(s) Oral four times a day PRN Moderate Pain (4 - 6)      ALLERGIES: No Known Allergies      FAMILY HISTORY:  No pertinent family history in first degree relatives        PHYSICAL EXAMINATION:  -----------------------------  T(C): 37.6 (12-22-22 @ 04:34), Max: 37.7 (12-21-22 @ 19:51)  HR: 66 (12-22-22 @ 04:34) (66 - 70)  BP: 128/57 (12-22-22 @ 04:34) (108/59 - 128/57)  RR: 16 (12-22-22 @ 04:34) (16 - 16)  SpO2: 95% (12-22-22 @ 04:34) (95% - 97%)  Wt(kg): --    12-21 @ 07:01  -  12-22 @ 07:00  --------------------------------------------------------  IN:    dextrose 5% + sodium chloride 0.45%: 550 mL    IV PiggyBack: 100 mL    Jevity 1.5: 390 mL  Total IN: 1040 mL    OUT:    Voided (mL): 950 mL  Total OUT: 950 mL    Total NET: 90 mL            VITALS  T(C): 37.6 (12-22-22 @ 04:34), Max: 37.7 (12-21-22 @ 19:51)  HR: 66 (12-22-22 @ 04:34) (66 - 70)  BP: 128/57 (12-22-22 @ 04:34) (108/59 - 128/57)  RR: 16 (12-22-22 @ 04:34) (16 - 16)  SpO2: 95% (12-22-22 @ 04:34) (95% - 97%)    Constitutional: well developed, normal appearance, well groomed, well nourished, no deformities and no acute distress.   Eyes: the conjunctiva exhibited no abnormalities and the eyelids demonstrated no xanthelasmas.   HEENT: normal oral mucosa, no oral pallor and no oral cyanosis.   Neck: normal jugular venous A waves present, normal jugular venous V waves present and no jugular venous cullen A waves.   Pulmonary: no respiratory distress, normal respiratory rhythm and effort, no accessory muscle use and lungs were clear to auscultation bilaterally.   Cardiovascular: heart rate and rhythm were normal, normal S1 and S2 and no murmur, gallop, rub, heave or thrill are present.   Abdomen: soft, non-tender, no hepato-splenomegaly and no abdominal mass palpated.   Musculoskeletal: the gait could not be assessed..   Extremities: no clubbing of the fingernails, no localized cyanosis, no petechial hemorrhages and no ischemic changes.   Skin: normal skin color and pigmentation, no rash, no venous stasis, no skin lesions, no skin ulcer and no xanthoma was observed.   Psychiatric: oriented to person, place, and time, the affect was normal, the mood was normal and not feeling anxious.     LABS:   --------  12-21    137  |  105  |  18  ----------------------------<  120<H>  4.2   |  26  |  0.71    Ca    9.9      21 Dec 2022 07:35  Mg     2.2     12-21    TPro  7.6  /  Alb  2.4<L>  /  TBili  0.2  /  DBili  x   /  AST  28  /  ALT  29  /  AlkPhos  71  12-20                         9.7    7.21  )-----------( 410      ( 21 Dec 2022 07:35 )             31.3     PT/INR - ( 21 Dec 2022 07:35 )   PT: 13.4 sec;   INR: 1.14 ratio               172 mg/dL, --, 45 mg/dL<L>, 112 mg/dL    Culture Results:   No growth to date. (12-20 @ 16:10)  Culture Results:   No growth to date. (12-20 @ 16:05)      RADIOLOGY:  -----------------    ECG:     ECHO:

## 2022-12-24 NOTE — PROGRESS NOTE ADULT - PROBLEM SELECTOR PLAN 2
-Herpes zoster  --continue Valacyclovir 1g PO TID to complete 10 days  -denies any visual symptoms or pain, spoke to ID -outpatient  follow up with Ophthalmology recommended after discharge Anticipate d/c in 24-48 hrs   -no need for contact isolation per Infection Control
lubrication and erythromycin ointment as per opthalmology evaluation
-Herpes zoster  -start Valacyclovir 1g PO TID   -contact isolation
-Herpes zoster  --continue Valacyclovir 1g PO TID to complete 10 days  -denies any visual symptoms or pain, spoke to ID -outpatient  follow up with Ophthalmology recommended after discharge Anticipate d/c in 24-48 hrs   -no need for contact isolation per Infection Control

## 2022-12-24 NOTE — PROGRESS NOTE ADULT - PROVIDER SPECIALTY LIST ADULT
Cardiology
Infectious Disease
Infectious Disease
Nephrology
Nephrology
Infectious Disease
Nephrology
Cardiology
Nephrology
Cardiology
Hospitalist

## 2022-12-24 NOTE — DISCHARGE NOTE PROVIDER - NSDCCPCAREPLAN_GEN_ALL_CORE_FT
PRINCIPAL DISCHARGE DIAGNOSIS  Diagnosis: Facial cellulitis  Assessment and Plan of Treatment:       SECONDARY DISCHARGE DIAGNOSES  Diagnosis: Facial herpetic lesions  Assessment and Plan of Treatment:     Diagnosis: Chemosis, conjunctiva, left  Assessment and Plan of Treatment:     Diagnosis: GERD (gastroesophageal reflux disease)  Assessment and Plan of Treatment:     Diagnosis: Hypercalcemia  Assessment and Plan of Treatment:     Diagnosis: Bipolar disorder  Assessment and Plan of Treatment:     Diagnosis: Constipation  Assessment and Plan of Treatment:     Diagnosis: DM (diabetes mellitus)  Assessment and Plan of Treatment:

## 2022-12-24 NOTE — PROGRESS NOTE ADULT - ASSESSMENT
82-year-old female with history of diabetes, anemia, bipolar disorder, delirium/psychosis, edema, from [facility], who presented with left facial redness and swelling. She was initially being treated for left preseptal cellulitis, but there is also concern for herpes zoster given new vesicular/pustular lesions noted on 12/22. Per Dr. Julio ER discussed case with Ophthalmology.    Erythema/swelling and lesions appear less overall. She denies any eye pain or blurring of vision--however poor historian. She had low grade temperature but no fever or leukocytosis. Blood cultures remain no growth. MRSA nasal PCR negative.    1. Herpes zoster ophthalmicus  -continue Valacyclovir 1g PO TID to complete 10 days  -consider transfer for Ophthalmology evaluation of VZV retinitis  -no need for contact isolation per Infection Control    2. Possible left preseptal cellulitis  -can switch to Augmentin for 5 more days  -discontinue ceftriaxone and Flagyl    Shahla Apple MD  Division of Infectious Diseases   Cell [phone] between 8am and 6pm   After 6pm and weekends please call ID service at [phone]. 82-year-old female with history of diabetes, anemia, bipolar disorder, delirium/psychosis, breast cancer s/p lumpectomy and RT, from [facility], who presented with left facial redness and swelling. She was initially being treated for left preseptal cellulitis, but there is also concern for herpes zoster given new vesicular/pustular lesions noted on 12/22. Per Dr. Julio ER discussed case with Ophthalmology.    Erythema/swelling and lesions appear less overall. She denies any eye pain or blurring of vision--however poor historian. She had low grade temperature but no fever or leukocytosis. Blood cultures remain no growth. MRSA nasal PCR negative.    1. Herpes zoster ophthalmicus  -continue Valacyclovir 1g PO TID to complete 10 days  -consider transfer for Ophthalmology evaluation of VZV retinitis  -no need for contact isolation per Infection Control    2. Possible left preseptal cellulitis  -can switch to Augmentin for 5 more days  -discontinue ceftriaxone and Flagyl    Shahla Apple MD  Division of Infectious Diseases   Cell [phone] between 8am and 6pm   After 6pm and weekends please call ID service at [phone]. 82-year-old female with history of diabetes, anemia, bipolar disorder, delirium/psychosis, breast cancer s/p lumpectomy and RT, from [facility], who presented with left facial redness and swelling. She was initially being treated for left preseptal cellulitis, but there is also concern for herpes zoster given new vesicular/pustular lesions noted on 12/22. Per Dr. Julio ER discussed case with Ophthalmology.    Erythema/swelling and lesions appear less overall. She denies any eye pain or blurring of vision--however poor historian. She had low grade temperature but no fever or leukocytosis. Blood cultures remain no growth. MRSA nasal PCR negative.    1. Herpes zoster ophthalmicus  -continue Valacyclovir 1g PO TID to complete 10 days  -suggest transfer for Ophthalmology evaluation of VZV retinitis--if concern for this or underlying visual involvement consider switching to IV acyclovir  -no need for contact isolation per Infection Control    2. Possible left preseptal cellulitis  -can switch to Augmentin for 5 more days  -discontinue ceftriaxone and Flagyl    Shahla Apple MD  Division of Infectious Diseases   Cell [phone] between 8am and 6pm   After 6pm and weekends please call ID service at [phone].

## 2022-12-24 NOTE — PROGRESS NOTE ADULT - PROBLEM SELECTOR PROBLEM 3
Chemosis, conjunctiva, left
Chemosis, conjunctiva, left
GERD (gastroesophageal reflux disease)
Chemosis, conjunctiva, left

## 2022-12-24 NOTE — PROGRESS NOTE ADULT - SUBJECTIVE AND OBJECTIVE BOX
[facility]  INFECTIOUS DISEASES - Farhat Jones, [place] [address]  Tel: [phone]     Fax: [phone]  =======================================================    JAMISON, VERA [id number]    Follow up: Tmax of 99.5. Has some pain on face but denies any eye pain or blurring of vision.    Allergies:  No Known Allergies      Antibiotics:  acetaminophen     Tablet .. 650 milliGRAM(s) Oral every 6 hours PRN  aluminum hydroxide/magnesium hydroxide/simethicone Suspension 30 milliLiter(s) Oral every 4 hours PRN  artificial tears (preservative free) Ophthalmic Solution 1 Drop(s) Both EYES three times a day  bisacodyl Suppository 10 milliGRAM(s) Rectal daily PRN  cefTRIAXone   IVPB 1000 milliGRAM(s) IV Intermittent every 24 hours  cinacalcet 30 milliGRAM(s) Oral <User Schedule>  dextrose 5% + sodium chloride 0.45%. 1000 milliLiter(s) IV Continuous <Continuous>  dextrose 5%. 1000 milliLiter(s) IV Continuous <Continuous>  dextrose 5%. 1000 milliLiter(s) IV Continuous <Continuous>  dextrose 50% Injectable 25 Gram(s) IV Push once  dextrose 50% Injectable 12.5 Gram(s) IV Push once  dextrose 50% Injectable 25 Gram(s) IV Push once  dextrose Oral Gel 15 Gram(s) Oral once PRN  diphenhydrAMINE 25 milliGRAM(s) Oral every 6 hours PRN  erythromycin   Ointment 1 Application(s) Left EYE every 4 hours  ferrous    sulfate Liquid 300 milliGRAM(s) Enteral Tube daily  glucagon  Injectable 1 milliGRAM(s) IntraMuscular once  heparin   Injectable 5000 Unit(s) SubCutaneous every 12 hours  insulin lispro (ADMELOG) corrective regimen sliding scale   SubCutaneous every 6 hours  lactobacillus acidophilus 1 Tablet(s) Oral daily  lithium 300 milliGRAM(s) Oral daily  magnesium hydroxide Suspension 30 milliLiter(s) Oral daily PRN  melatonin 3 milliGRAM(s) Oral at bedtime PRN  metroNIDAZOLE  IVPB 500 milliGRAM(s) IV Intermittent every 8 hours  metroNIDAZOLE  IVPB      ondansetron Injectable 4 milliGRAM(s) IV Push every 8 hours PRN  pantoprazole  Injectable 40 milliGRAM(s) IV Push daily  polyethylene glycol 3350 17 Gram(s) Oral daily PRN  senna 2 Tablet(s) Oral at bedtime  traMADol 50 milliGRAM(s) Oral four times a day PRN  valACYclovir 1000 milliGRAM(s) Oral every 8 hours       REVIEW OF SYSTEMS: *limited given poor historian*  CONSTITUTIONAL:  No Fevers  HEENT:  see history  CARDIOVASCULAR:  No chest pain or SOB  RESPIRATORY:  No cough, shortness of breath  GASTROINTESTINAL:  No nausea, vomiting or diarrhea  NEUROLOGIC:  No headache    Physical Exam:  ICU Vital Signs Last 24 Hrs  T(C): 36.7 (24 Dec 2022 12:09), Max: 37.5 (24 Dec 2022 04:45)  T(F): 98 (24 Dec 2022 12:09), Max: 99.5 (24 Dec 2022 04:45)  HR: 74 (24 Dec 2022 12:09) (71 - 75)  BP: 105/64 (24 Dec 2022 12:09) (105/64 - 124/69)  BP(mean): --  ABP: --  ABP(mean): --  RR: 16 (24 Dec 2022 12:09) (16 - 17)  SpO2: 96% (24 Dec 2022 12:09) (96% - 97%)    O2 Parameters below as of 24 Dec 2022 12:09  Patient On (Oxygen Delivery Method): room air      GEN: NAD  HEENT: (+) swelling and erythema around L eye slightly decreased overall--but still difficulty opening left eye, vesicles/pustules noted on L side of forehead and near inner corner of eye--some starting to crust  NECK: Supple.    LUNGS: Normal respiratory effort  HEART: Regular rate and rhythm   ABDOMEN: Soft, nontender, and nondistended.    EXTREMITIES: No leg edema.  NEUROLOGIC: AO x 1, Answering some questions    Labs:  12-24    138  |  106  |  18  ----------------------------<  118<H>  3.9   |  24  |  0.61    Ca    9.4      24 Dec 2022 07:07                            9.9    8.49  )-----------( 340      ( 24 Dec 2022 07:07 )             32.8             RECENT CULTURES:  12-21 @ 11:10 Clean Catch Clean Catch (Midstream)     <10,000 CFU/mL Normal Urogenital Joanie        12-20 @ 16:10 .Blood Blood-Peripheral     No growth to date.        12-20 @ 16:05 .Blood Blood-Peripheral     No growth to date.              All imaging and data are reviewed.

## 2022-12-24 NOTE — ED PROVIDER NOTE - PHYSICAL EXAMINATION
Non toxic appearing. A&Ox2. GENERAL: NAD  HEENT:  Atraumatic  CHEST/LUNG: Chest rise equal bilaterally  HEART: Regular rate and rhythm  ABDOMEN: Soft, Nontender, Nondistended  EXTREMITIES:  Extremities warm  PSYCH: A&Ox2  SKIN: Erythematous rash left V1 distribution consistent w/ shingles diagnosis.   NEUROLOGY: strength and sensation intact in all extremities.

## 2022-12-24 NOTE — PROGRESS NOTE ADULT - ASSESSMENT
82-year-old female with history of diabetes, anemia, bipolar disorder, delirium/psychosis, edema presents with his current long-term resident of [facility] brought in by EMS for left facial redness and swelling today.  No aggravating or alleviating factors otherwise noted.  No known trauma.  Patient unable to give history, no other acute complaints at this time .ER physician Discussed with ophthalmology, Dr. Barrios, sent l eye  photos, she states is likely just chemosis, continue frequent lubrication, erythromycin ointment every 4 hours. Septic workup sent and ID consult called Palliative care consult requested ,to discuss advance directives and complete MOLST

## 2022-12-24 NOTE — DISCHARGE NOTE PROVIDER - PROVIDER TOKENS
PROVIDER:[TOKEN:[[id number]],FOLLOWUP:[1-3 days]],PROVIDER:[TOKEN:[[id number]],FOLLOWUP:[1 week]]

## 2022-12-24 NOTE — PROGRESS NOTE ADULT - PROBLEM SELECTOR PLAN 1
-Left preseptal cellulitis  -continue ceftriaxone and Flagyl  -continue vancomycin pending MRSA nasal PCR  -follow MRSA nasal PCR, blood cultures
-Left preseptal cellulitis  -continue ceftriaxone and Flagyl  -continue vancomycin pending MRSA nasal PCR  -follow MRSA nasal PCR, blood cultures
iv abx ,septic workup ,ID cons ,tylenol prn
-Left preseptal cellulitis  -continue ceftriaxone and Flagyl  -continue vancomycin pending MRSA nasal PCR  -follow MRSA nasal PCR, blood cultures

## 2022-12-24 NOTE — ED PROVIDER NOTE - OBJECTIVE STATEMENT
82-year-old female with history of diabetes, anemia, bipolar disorder, delirium/psychosis, edema presents with his current long-term resident of [facility] brought in by EMS from [facility] from under Dr. Adams care to be evalauted by ophthomology. Pt presented to that hospital for left facial redness and swelling as per the chart review.  Pt is being treated with antibiotics for left preseptal cellulitis and antivirals for possible shingles at that facility. Pt is unable to provide history herself. 82-year-old female with history of diabetes, anemia, bipolar disorder, delirium/psychosis, edema presents with his current long-term resident of [facility] brought in by EMS from [facility] from under Dr. Adams care to be evaluated by ophthalmology. Pt presented to that hospital for left facial redness and swelling as per the chart review.  Pt is being treated with antibiotics for left preseptal cellulitis and antivirals for possible shingles at that facility. Pt is unable to provide history herself.

## 2022-12-24 NOTE — PROGRESS NOTE ADULT - SUBJECTIVE AND OBJECTIVE BOX
PROGRESS NOTE  Patient is a 82y old  Female who presents with a chief complaint of Cellulitis of face     (21 Dec 2022 13:55)    Chart and available morning labs /imaging are reviewed electronically , urgent issues addressed . More information  is being added upon completion of rounds , when more information is collected and management discussed with consultants , medical staff and social service/case management on the floor   OVERNIGHT  No new issues reported by medical staff . All above noted     HPI:  82-year-old female with history of diabetes, anemia, bipolar disorder, delirium/psychosis, edema presents with his current long-term resident of [facility] brought in by EMS for left facial redness and swelling today.  No aggravating or alleviating factors otherwise noted.  No known trauma.  Patient unable to give history, no other acute complaints at this timeBipolar mood disorder     Breast cancer, left treated with surgery and RT, no lymph node dissection    Esophageal reflux     Hip pain, acute, right     Hypercalcemia.     PAST SURGICAL HISTORY:  S/P cataract extraction, unspecified laterality bilateral - 2016    S/P lumpectomy, left breast. < from: CT Orbit w/ IV Cont (12.20.22 @ 17:38) >    ACC: [id number] EXAM:  CT ORBITS IC                          PROCEDURE DATE:  12/20/2022          INTERPRETATION:  CLINICAL STATEMENT: Left facial infection. Rule out   orbital cellulitis    TECHNIQUE: CT of the orbits was performed with IV contrast.Coronal   reformat was obtained. 90 cc of Omnipaque 350 administered    COMPARISON: None.    FINDINGS:  There is soft tissue stranding of the fat in the left preseptal space.   The globes are intact. No inflammatory stranding in the left retrobulbar   fat/intraconal space.    There is no loculated fluid collection.    Small retention cyst/polyp right maxillary sinus    IMPRESSION:  Left preseptal cellulitis without evidence of orbital cellulitis    --- End of Report ---            MEGHAN RODRIGUEZ MD; Attending Radiologist  This document has been electronically signed. Dec 20 2022  7:20PM    < end of copied text >  < from: Xray Fluoro up to 1 Hr in OR (03.07.18 @ 12:49) >    EXAM:  FLOURO IN O.R. 1HR [id number]                                  PROCEDURE DATE:  03/07/2018          INTERPRETATION:  Imaging guidance was provided by the Department of   Radiology for a procedure performed by a physician from another clinical   department. This study was performed and will be interpreted by that   physician and therefore the department of radiology will not render an   interpretation of these images.    This report was generated by an  in   the department of radiology.                  DEPARTMENT RADIOLOGY   This document has been electronically signed. Mar  8 2018 10:09AM                < end of copied text >  < from: Xray Fluoro up to 1 Hr in OR (03.07.18 @ 12:49) >    EXAM:  FLOURO IN O.R. 1HR [id number]                                  PROCEDURE DATE:  03/07/2018          INTERPRETATION:  Imaging guidance was provided by the Department of   Radiology for a procedure performed by a physician from another clinical   department. This study was performed and will be interpreted by that   physician and therefore the department of radiology will not render an   interpretation of these images.    This report was generated by an  in   the department of radiology.                  DEPARTMENT RADIOLOGY   This document has been electronically signed. Mar  8 2018 10:09AM                < end of copied text >  < from: Xray Chest 1 View AP/PA (11.14.14 @ 17:19) >     EXAM:  CHEST 1 VIEW           PROCEDURE DATE:  11/14/2014      INTERPRETATION:  Clinical information: Hypercalcemia    No prior studies present for comparison  Portable study, 5:09 PM  Clear lungs. No sign of infiltrate effusion or congestive failure. Heart   size within normal limits. Aortic knob contains calcifications. Prominent   right paratracheal shadow likely related to tortuous brachiocephalic   vessels. Focal calcification present right humeral neck.  Surgical clips visible along lateral aspect of left chest wall.    IMPRESSION: No active disease.              PILI TESFAYE M.D.,ATTENDING RADIOLOGIST  This examination was interpreted on: Nov 14 2014  5:28P.  This document   has been electronically signed. Nov 14 2014  5:30P.          < end of copied text >   (20 Dec 2022 21:00)    PAST MEDICAL & SURGICAL HISTORY:  Bipolar mood disorder      Esophageal reflux      Hypercalcemia      Hip pain, acute, right      Breast cancer, left  treated with surgery and RT, no lymph node dissection      S/P cataract extraction, unspecified laterality  bilateral - 2016      S/P lumpectomy, left breast          MEDICATIONS  (STANDING):  artificial tears (preservative free) Ophthalmic Solution 1 Drop(s) Both EYES three times a day  cefTRIAXone   IVPB 1000 milliGRAM(s) IV Intermittent every 24 hours  cinacalcet 30 milliGRAM(s) Oral <User Schedule>  dextrose 5% + sodium chloride 0.45%. 1000 milliLiter(s) (50 mL/Hr) IV Continuous <Continuous>  dextrose 5%. 1000 milliLiter(s) (100 mL/Hr) IV Continuous <Continuous>  dextrose 5%. 1000 milliLiter(s) (50 mL/Hr) IV Continuous <Continuous>  dextrose 50% Injectable 25 Gram(s) IV Push once  dextrose 50% Injectable 12.5 Gram(s) IV Push once  dextrose 50% Injectable 25 Gram(s) IV Push once  erythromycin   Ointment 1 Application(s) Left EYE every 4 hours  ferrous    sulfate Liquid 300 milliGRAM(s) Enteral Tube daily  glucagon  Injectable 1 milliGRAM(s) IntraMuscular once  heparin   Injectable 5000 Unit(s) SubCutaneous every 12 hours  insulin lispro (ADMELOG) corrective regimen sliding scale   SubCutaneous every 6 hours  lactobacillus acidophilus 1 Tablet(s) Oral daily  lithium 300 milliGRAM(s) Oral daily  metroNIDAZOLE  IVPB 500 milliGRAM(s) IV Intermittent every 8 hours  metroNIDAZOLE  IVPB      pantoprazole  Injectable 40 milliGRAM(s) IV Push daily  senna 2 Tablet(s) Oral at bedtime  valACYclovir 1000 milliGRAM(s) Oral every 8 hours    MEDICATIONS  (PRN):  acetaminophen     Tablet .. 650 milliGRAM(s) Oral every 6 hours PRN Temp greater or equal to 38C (100.4F), Mild Pain (1 - 3)  aluminum hydroxide/magnesium hydroxide/simethicone Suspension 30 milliLiter(s) Oral every 4 hours PRN Dyspepsia  bisacodyl Suppository 10 milliGRAM(s) Rectal daily PRN Constipation  dextrose Oral Gel 15 Gram(s) Oral once PRN Blood Glucose LESS THAN 70 milliGRAM(s)/deciliter  diphenhydrAMINE 25 milliGRAM(s) Oral every 6 hours PRN Rash and/or Itching  magnesium hydroxide Suspension 30 milliLiter(s) Oral daily PRN Constipation  melatonin 3 milliGRAM(s) Oral at bedtime PRN Insomnia  ondansetron Injectable 4 milliGRAM(s) IV Push every 8 hours PRN Nausea and/or Vomiting  polyethylene glycol 3350 17 Gram(s) Oral daily PRN Constipation  traMADol 50 milliGRAM(s) Oral four times a day PRN Moderate Pain (4 - 6)      OBJECTIVE    T(C): 36.7 (12-24-22 @ 12:09), Max: 37.5 (12-24-22 @ 04:45)  HR: 74 (12-24-22 @ 12:09) (71 - 75)  BP: 105/64 (12-24-22 @ 12:09) (105/64 - 124/69)  RR: 16 (12-24-22 @ 12:09) (16 - 17)  SpO2: 96% (12-24-22 @ 12:09) (96% - 97%)  Wt(kg): --  I&O's Summary    23 Dec 2022 07:01  -  24 Dec 2022 07:00  --------------------------------------------------------  IN: 1320 mL / OUT: 300 mL / NET: 1020 mL          REVIEW OF SYSTEMS:  CONSTITUTIONAL: No fever, weight loss, or fatigue  EYES: No eye pain, visual disturbances, or discharge  ENMT:   No sinus or throat pain  NECK: No pain or stiffness  RESPIRATORY: No cough, wheezing, chills or hemoptysis; No shortness of breath  CARDIOVASCULAR: No chest pain, palpitations, dizziness, or leg swelling  GASTROINTESTINAL: No abdominal pain. No nausea, vomiting; No diarrhea or constipation. No melena or hematochezia.  GENITOURINARY: No dysuria, frequency, hematuria, or incontinence  NEUROLOGICAL: No headaches, memory loss, loss of strength, numbness, or tremors  SKIN: No itching, burning, rashes, or lesions   MUSCULOSKELETAL: No joint pain or swelling; No muscle, back, or extremity pain    PHYSICAL EXAM:  Appearance: NAD. VS past 24 hrs -as above   HEENT:   Moist oral mucosa. Conjunctiva clear b/l.   Neck : supple  Respiratory: Lungs CTAB.  Gastrointestinal:  Soft, nontender. No rebound. No rigidity. BS present	  Cardiovascular: RRR ,S1S2 present  Neurologic: Non-focal. Moving all extremities.  Extremities: No edema. No erythema. No calf tenderness.  Skin: No rashes, No ecchymoses, No cyanosis.	  wounds ,skin lesions-See skin assesment flow sheet   LABS:                        9.9    8.49  )-----------( 340      ( 24 Dec 2022 07:07 )             32.8     12-24    138  |  106  |  18  ----------------------------<  118<H>  3.9   |  24  |  0.61    Ca    9.4      24 Dec 2022 07:07      CAPILLARY BLOOD GLUCOSE      POCT Blood Glucose.: 134 mg/dL (24 Dec 2022 11:38)  POCT Blood Glucose.: 125 mg/dL (24 Dec 2022 06:53)  POCT Blood Glucose.: 114 mg/dL (23 Dec 2022 23:55)  POCT Blood Glucose.: 168 mg/dL (23 Dec 2022 17:37)          Culture - Urine (collected 21 Dec 2022 11:10)  Source: Clean Catch Clean Catch (Midstream)  Final Report (22 Dec 2022 14:26):    <10,000 CFU/mL Normal Urogenital Joanie    Culture - Blood (collected 20 Dec 2022 16:10)  Source: .Blood Blood-Peripheral  Preliminary Report (22 Dec 2022 02:02):    No growth to date.    Culture - Blood (collected 20 Dec 2022 16:05)  Source: .Blood Blood-Peripheral  Preliminary Report (22 Dec 2022 02:02):    No growth to date.      RADIOLOGY & ADDITIONAL TESTS:   reviewed elctronically  ASSESSMENT/PLAN: 	    Patient was seen and examined on a day of discharge . Plan of care , discharge medications and recommendations discussed with consultants and clearance for discharge obtained .Social service , case management  and medical staff are aware of plan. Family is notified. Discharge summary  is  prepared electronically-see separate document prepared by me .45minutes spent on this visit, 50% visit time spent in care co-ordination with other attendings and counselling patient  I have discussed care plan with patient and HCP,expressed understanding of problems treatment and their effect and side effects, alternatives in detail,I have asked if they have any questions and concerns and appropriately addressed them to best of my ability

## 2022-12-24 NOTE — PROGRESS NOTE ADULT - SUBJECTIVE AND OBJECTIVE BOX
Patient is a 82y Female whom presented to the hospital with high pth     PAST MEDICAL & SURGICAL HISTORY:  Bipolar mood disorder      Esophageal reflux      Hypercalcemia      Hip pain, acute, right      Breast cancer, left  treated with surgery and RT, no lymph node dissection      S/P cataract extraction, unspecified laterality  bilateral - 2016      S/P lumpectomy, left breast          MEDICATIONS  (STANDING):  artificial tears (preservative free) Ophthalmic Solution 1 Drop(s) Both EYES three times a day  cefTRIAXone   IVPB 1000 milliGRAM(s) IV Intermittent every 24 hours  cinacalcet 60 milliGRAM(s) Oral daily  dextrose 5% + sodium chloride 0.45%. 1000 milliLiter(s) (50 mL/Hr) IV Continuous <Continuous>  dextrose 5%. 1000 milliLiter(s) (100 mL/Hr) IV Continuous <Continuous>  dextrose 5%. 1000 milliLiter(s) (50 mL/Hr) IV Continuous <Continuous>  dextrose 50% Injectable 25 Gram(s) IV Push once  dextrose 50% Injectable 12.5 Gram(s) IV Push once  dextrose 50% Injectable 25 Gram(s) IV Push once  erythromycin   Ointment 1 Application(s) Left EYE every 4 hours  ferrous    sulfate Liquid 300 milliGRAM(s) Enteral Tube daily  glucagon  Injectable 1 milliGRAM(s) IntraMuscular once  heparin   Injectable 5000 Unit(s) SubCutaneous every 12 hours  insulin lispro (ADMELOG) corrective regimen sliding scale   SubCutaneous every 6 hours  lactobacillus acidophilus 1 Tablet(s) Oral daily  lithium 300 milliGRAM(s) Oral daily  metroNIDAZOLE  IVPB 500 milliGRAM(s) IV Intermittent every 8 hours  metroNIDAZOLE  IVPB      pantoprazole    Tablet 40 milliGRAM(s) Oral before breakfast  senna 2 Tablet(s) Oral at bedtime  vancomycin  IVPB 1000 milliGRAM(s) IV Intermittent every 24 hours      Allergies    No Known Allergies    Intolerances        SOCIAL HISTORY:  Denies ETOh,Smoking,     FAMILY HISTORY:  No pertinent family history in first degree relatives        REVIEW OF SYSTEMS:    CONSTITUTIONAL: No weakness, fevers or chills  RESPIRATORY: No cough, wheezing, hemoptysis; No shortness of breath  CARDIOVASCULAR: No chest pain or palpitations  GASTROINTESTINAL: No abdominal or epigastric pain. No nausea, vomiting,     No diarrhea or constipation. No melena   GENITOURINARY: No dysuria, frequency or hematuria  NEUROLOGICAL: No numbness or weakness  SKIN: dry                          9.9    8.49  )-----------( 340      ( 24 Dec 2022 07:07 )             32.8       CBC Full  -  ( 24 Dec 2022 07:07 )  WBC Count : 8.49 K/uL  RBC Count : 3.39 M/uL  Hemoglobin : 9.9 g/dL  Hematocrit : 32.8 %  Platelet Count - Automated : 340 K/uL  Mean Cell Volume : 96.8 fl  Mean Cell Hemoglobin : 29.2 pg  Mean Cell Hemoglobin Concentration : 30.2 gm/dL  Auto Neutrophil # : x  Auto Lymphocyte # : x  Auto Monocyte # : x  Auto Eosinophil # : x  Auto Basophil # : x  Auto Neutrophil % : x  Auto Lymphocyte % : x  Auto Monocyte % : x  Auto Eosinophil % : x  Auto Basophil % : x      12-24    138  |  106  |  18  ----------------------------<  118<H>  3.9   |  24  |  0.61    Ca    9.4      24 Dec 2022 07:07        CAPILLARY BLOOD GLUCOSE      POCT Blood Glucose.: 128 mg/dL (24 Dec 2022 16:56)  POCT Blood Glucose.: 134 mg/dL (24 Dec 2022 11:38)  POCT Blood Glucose.: 125 mg/dL (24 Dec 2022 06:53)  POCT Blood Glucose.: 114 mg/dL (23 Dec 2022 23:55)      Vital Signs Last 24 Hrs  T(C): 36.7 (24 Dec 2022 12:09), Max: 37.5 (24 Dec 2022 04:45)  T(F): 98 (24 Dec 2022 12:09), Max: 99.5 (24 Dec 2022 04:45)  HR: 74 (24 Dec 2022 12:09) (71 - 75)  BP: 105/64 (24 Dec 2022 12:09) (105/64 - 124/69)  BP(mean): --  RR: 16 (24 Dec 2022 12:09) (16 - 17)  SpO2: 96% (24 Dec 2022 12:09) (96% - 97%)    Parameters below as of 24 Dec 2022 12:09  Patient On (Oxygen Delivery Method): room air                    PHYSICAL EXAM:    Constitutional: NAD  HEENT: conjunctive   clear   Neck:  No JVD  Respiratory: CTAB  Cardiovascular: S1 and S2  Gastrointestinal: BS+, soft, NT/ND  Extremities: No peripheral edema  Skin: dry  Access: Not applicable

## 2022-12-24 NOTE — DISCHARGE NOTE PROVIDER - HOSPITAL COURSE
82-year-old female with history of diabetes, anemia, bipolar disorder, delirium/psychosis, edema presents with his current long-term resident of [facility] brought in by EMS for left facial redness and swelling today.  No aggravating or alleviating factors otherwise noted.  No known trauma.  Patient unable to give history, no other acute complaints at this time .ER physician Discussed with ophthalmology, Dr. Barrios, sent l eye  photos, she states is likely just chemosis, continue frequent lubrication, erythromycin ointment every 4 hours. Septic workup sent and ID consult called Palliative care consult requested ,to discuss advance directives and complete MOLST     Problem/Plan - 1:  ·  Problem: Facial cellulitis.   ·  Plan: -Left preseptal cellulitis  -continue ceftriaxone and Flagyl  -continue vancomycin pending MRSA nasal PCR  -follow MRSA nasal PCR, blood cultures.    Problem/Plan - 2:  ·  Problem: Facial herpetic lesions.   ·  Plan: -Herpes zoster  --continue Valacyclovir 1g PO TID to complete 10 days  -denies any visual symptoms or pain, spoke to ID -outpatient  follow up with Ophthalmology recommended after discharge Anticipate d/c in 24-48 hrs   -no need for contact isolation per Infection Control.    Problem/Plan - 3:  ·  Problem: Chemosis, conjunctiva, left.   ·  Plan: lubrication and erythromycin ointment as per opthalmology evaluation.    Problem/Plan - 4:  ·  Problem: GERD (gastroesophageal reflux disease).   ·  Plan: ppi.    Problem/Plan - 5:  ·  Problem: DM (diabetes mellitus).   ·  Plan: Accuchecks monitoring and insulin corrective regimen  sliding scale coverage with short acting inslulin, add longacting insulin as needed ,no concentrated sweets diet, serial labs ,HbA1C,education.    Problem/Plan - 6:  ·  Problem: Hypercalcemia.   ·  Plan: on sensipar ,management as per nephrologist.    Problem/Plan - 7:  ·  Problem: Bipolar disorder.   ·  Plan: continue home medications -lithium.    Problem/Plan - 8:  ·  Problem: Constipation.   ·  Plan: bowel regimen.    Problem/Plan - 9:  ·  Problem: Prophylactic measure.   ·  Plan: Gastrointestinal stress ulcer prophylaxis and DVT prophylaxis administered.

## 2022-12-24 NOTE — PROGRESS NOTE ADULT - PROBLEM SELECTOR PLAN 6
on sensipar ,management as per nephrologist
continue home medications -lithium
on sensipar ,management as per nephrologist
on sensipar ,management as per nephrologist

## 2022-12-24 NOTE — PROGRESS NOTE ADULT - PROBLEM SELECTOR PLAN 3
lubrication and erythromycin ointment as per opthalmology evaluation
lubrication and erythromycin ointment as per opthalmology evaluation
ppi
lubrication and erythromycin ointment as per opthalmology evaluation

## 2022-12-25 DIAGNOSIS — D64.9 ANEMIA, UNSPECIFIED: ICD-10-CM

## 2022-12-25 DIAGNOSIS — H57.10 OCULAR PAIN, UNSPECIFIED EYE: ICD-10-CM

## 2022-12-25 DIAGNOSIS — B00.9 HERPESVIRAL INFECTION, UNSPECIFIED: ICD-10-CM

## 2022-12-25 DIAGNOSIS — L03.211 CELLULITIS OF FACE: ICD-10-CM

## 2022-12-25 DIAGNOSIS — Z86.39 PERSONAL HISTORY OF OTHER ENDOCRINE, NUTRITIONAL AND METABOLIC DISEASE: ICD-10-CM

## 2022-12-25 DIAGNOSIS — E83.52 HYPERCALCEMIA: ICD-10-CM

## 2022-12-25 DIAGNOSIS — K21.9 GASTRO-ESOPHAGEAL REFLUX DISEASE WITHOUT ESOPHAGITIS: ICD-10-CM

## 2022-12-25 DIAGNOSIS — R63.8 OTHER SYMPTOMS AND SIGNS CONCERNING FOOD AND FLUID INTAKE: ICD-10-CM

## 2022-12-25 DIAGNOSIS — F31.9 BIPOLAR DISORDER, UNSPECIFIED: ICD-10-CM

## 2022-12-25 LAB
ALBUMIN SERPL ELPH-MCNC: 1.3 G/DL — LOW (ref 3.3–5)
ALBUMIN SERPL ELPH-MCNC: 2.8 G/DL — LOW (ref 3.3–5)
ALP SERPL-CCNC: 33 U/L — LOW (ref 40–120)
ALP SERPL-CCNC: 62 U/L — SIGNIFICANT CHANGE UP (ref 40–120)
ALT FLD-CCNC: 12 U/L — SIGNIFICANT CHANGE UP (ref 10–45)
ALT FLD-CCNC: 22 U/L — SIGNIFICANT CHANGE UP (ref 10–45)
ANION GAP SERPL CALC-SCNC: 10 MMOL/L — SIGNIFICANT CHANGE UP (ref 5–17)
ANION GAP SERPL CALC-SCNC: 10 MMOL/L — SIGNIFICANT CHANGE UP (ref 5–17)
AST SERPL-CCNC: 24 U/L — SIGNIFICANT CHANGE UP (ref 10–40)
AST SERPL-CCNC: 28 U/L — SIGNIFICANT CHANGE UP (ref 10–40)
BASE EXCESS BLDV CALC-SCNC: -0.8 MMOL/L — SIGNIFICANT CHANGE UP (ref -2–3)
BASOPHILS # BLD AUTO: 0.07 K/UL — SIGNIFICANT CHANGE UP (ref 0–0.2)
BASOPHILS NFR BLD AUTO: 0.8 % — SIGNIFICANT CHANGE UP (ref 0–2)
BILIRUB SERPL-MCNC: 0.2 MG/DL — SIGNIFICANT CHANGE UP (ref 0.2–1.2)
BILIRUB SERPL-MCNC: <0.1 MG/DL — LOW (ref 0.2–1.2)
BLD GP AB SCN SERPL QL: NEGATIVE — SIGNIFICANT CHANGE UP
BUN SERPL-MCNC: 10 MG/DL — SIGNIFICANT CHANGE UP (ref 7–23)
BUN SERPL-MCNC: 15 MG/DL — SIGNIFICANT CHANGE UP (ref 7–23)
CA-I SERPL-SCNC: 1.35 MMOL/L — HIGH (ref 1.15–1.33)
CALCIUM SERPL-MCNC: 5.1 MG/DL — CRITICAL LOW (ref 8.4–10.5)
CALCIUM SERPL-MCNC: 9.5 MG/DL — SIGNIFICANT CHANGE UP (ref 8.4–10.5)
CHLORIDE BLDV-SCNC: 104 MMOL/L — SIGNIFICANT CHANGE UP (ref 96–108)
CHLORIDE SERPL-SCNC: 104 MMOL/L — SIGNIFICANT CHANGE UP (ref 96–108)
CHLORIDE SERPL-SCNC: 122 MMOL/L — HIGH (ref 96–108)
CO2 BLDV-SCNC: 26 MMOL/L — SIGNIFICANT CHANGE UP (ref 22–26)
CO2 SERPL-SCNC: 21 MMOL/L — LOW (ref 22–31)
CO2 SERPL-SCNC: <10 MMOL/L — CRITICAL LOW (ref 22–31)
CREAT SERPL-MCNC: 0.55 MG/DL — SIGNIFICANT CHANGE UP (ref 0.5–1.3)
CREAT SERPL-MCNC: <0.3 MG/DL — LOW (ref 0.5–1.3)
EGFR: 106 ML/MIN/1.73M2 — SIGNIFICANT CHANGE UP
EGFR: 91 ML/MIN/1.73M2 — SIGNIFICANT CHANGE UP
EOSINOPHIL # BLD AUTO: 0.13 K/UL — SIGNIFICANT CHANGE UP (ref 0–0.5)
EOSINOPHIL NFR BLD AUTO: 1.6 % — SIGNIFICANT CHANGE UP (ref 0–6)
FLUAV AG NPH QL: SIGNIFICANT CHANGE UP
FLUBV AG NPH QL: SIGNIFICANT CHANGE UP
GAS PNL BLDV: 133 MMOL/L — LOW (ref 136–145)
GAS PNL BLDV: SIGNIFICANT CHANGE UP
GLUCOSE BLDV-MCNC: 85 MG/DL — SIGNIFICANT CHANGE UP (ref 70–99)
GLUCOSE SERPL-MCNC: 81 MG/DL — SIGNIFICANT CHANGE UP (ref 70–99)
GLUCOSE SERPL-MCNC: 94 MG/DL — SIGNIFICANT CHANGE UP (ref 70–99)
HCO3 BLDV-SCNC: 24 MMOL/L — SIGNIFICANT CHANGE UP (ref 22–29)
HCT VFR BLD CALC: 24.9 % — LOW (ref 34.5–45)
HCT VFR BLD CALC: 31.9 % — LOW (ref 34.5–45)
HCT VFR BLDA CALC: 28 % — LOW (ref 34.5–46.5)
HGB BLD CALC-MCNC: 9.4 G/DL — LOW (ref 11.7–16.1)
HGB BLD-MCNC: 10 G/DL — LOW (ref 11.5–15.5)
HGB BLD-MCNC: 7.6 G/DL — LOW (ref 11.5–15.5)
IMM GRANULOCYTES NFR BLD AUTO: 1.2 % — HIGH (ref 0–0.9)
LACTATE BLDV-MCNC: 1.5 MMOL/L — SIGNIFICANT CHANGE UP (ref 0.5–2)
LYMPHOCYTES # BLD AUTO: 1.61 K/UL — SIGNIFICANT CHANGE UP (ref 1–3.3)
LYMPHOCYTES # BLD AUTO: 19.5 % — SIGNIFICANT CHANGE UP (ref 13–44)
MAGNESIUM SERPL-MCNC: 2.1 MG/DL — SIGNIFICANT CHANGE UP (ref 1.6–2.6)
MCHC RBC-ENTMCNC: 28.9 PG — SIGNIFICANT CHANGE UP (ref 27–34)
MCHC RBC-ENTMCNC: 30.5 GM/DL — LOW (ref 32–36)
MCV RBC AUTO: 94.7 FL — SIGNIFICANT CHANGE UP (ref 80–100)
MONOCYTES # BLD AUTO: 0.76 K/UL — SIGNIFICANT CHANGE UP (ref 0–0.9)
MONOCYTES NFR BLD AUTO: 9.2 % — SIGNIFICANT CHANGE UP (ref 2–14)
NEUTROPHILS # BLD AUTO: 5.57 K/UL — SIGNIFICANT CHANGE UP (ref 1.8–7.4)
NEUTROPHILS NFR BLD AUTO: 67.7 % — SIGNIFICANT CHANGE UP (ref 43–77)
NRBC # BLD: 0 /100 WBCS — SIGNIFICANT CHANGE UP (ref 0–0)
PCO2 BLDV: 41 MMHG — SIGNIFICANT CHANGE UP (ref 39–42)
PH BLDV: 7.38 — SIGNIFICANT CHANGE UP (ref 7.32–7.43)
PHOSPHATE SERPL-MCNC: 2.4 MG/DL — LOW (ref 2.5–4.5)
PLATELET # BLD AUTO: 345 K/UL — SIGNIFICANT CHANGE UP (ref 150–400)
PO2 BLDV: 44 MMHG — SIGNIFICANT CHANGE UP (ref 25–45)
POTASSIUM BLDV-SCNC: 4.4 MMOL/L — SIGNIFICANT CHANGE UP (ref 3.5–5.1)
POTASSIUM SERPL-MCNC: 2.7 MMOL/L — CRITICAL LOW (ref 3.5–5.3)
POTASSIUM SERPL-MCNC: 4.2 MMOL/L — SIGNIFICANT CHANGE UP (ref 3.5–5.3)
POTASSIUM SERPL-SCNC: 2.7 MMOL/L — CRITICAL LOW (ref 3.5–5.3)
POTASSIUM SERPL-SCNC: 4.2 MMOL/L — SIGNIFICANT CHANGE UP (ref 3.5–5.3)
PROT SERPL-MCNC: 3.6 G/DL — LOW (ref 6–8.3)
PROT SERPL-MCNC: 6.3 G/DL — SIGNIFICANT CHANGE UP (ref 6–8.3)
RBC # BLD: 2.63 M/UL — LOW (ref 3.8–5.2)
RBC # FLD: 15.8 % — HIGH (ref 10.3–14.5)
RH IG SCN BLD-IMP: POSITIVE — SIGNIFICANT CHANGE UP
RSV RNA NPH QL NAA+NON-PROBE: SIGNIFICANT CHANGE UP
SAO2 % BLDV: 77.4 % — SIGNIFICANT CHANGE UP (ref 67–88)
SARS-COV-2 RNA SPEC QL NAA+PROBE: SIGNIFICANT CHANGE UP
SODIUM SERPL-SCNC: 135 MMOL/L — SIGNIFICANT CHANGE UP (ref 135–145)
SODIUM SERPL-SCNC: 141 MMOL/L — SIGNIFICANT CHANGE UP (ref 135–145)
WBC # BLD: 8.24 K/UL — SIGNIFICANT CHANGE UP (ref 3.8–10.5)
WBC # FLD AUTO: 8.24 K/UL — SIGNIFICANT CHANGE UP (ref 3.8–10.5)

## 2022-12-25 PROCEDURE — 70553 MRI BRAIN STEM W/O & W/DYE: CPT | Mod: 26

## 2022-12-25 PROCEDURE — 99223 1ST HOSP IP/OBS HIGH 75: CPT

## 2022-12-25 PROCEDURE — 70498 CT ANGIOGRAPHY NECK: CPT | Mod: 26

## 2022-12-25 PROCEDURE — 70496 CT ANGIOGRAPHY HEAD: CPT | Mod: 26

## 2022-12-25 PROCEDURE — 70543 MRI ORBT/FAC/NCK W/O &W/DYE: CPT | Mod: 26

## 2022-12-25 RX ORDER — ACETAMINOPHEN 500 MG
650 TABLET ORAL EVERY 6 HOURS
Refills: 0 | Status: DISCONTINUED | OUTPATIENT
Start: 2022-12-25 | End: 2022-12-28

## 2022-12-25 RX ORDER — PANTOPRAZOLE SODIUM 20 MG/1
40 TABLET, DELAYED RELEASE ORAL
Refills: 0 | Status: DISCONTINUED | OUTPATIENT
Start: 2022-12-25 | End: 2022-12-28

## 2022-12-25 RX ORDER — FERROUS SULFATE 325(65) MG
300 TABLET ORAL DAILY
Refills: 0 | Status: DISCONTINUED | OUTPATIENT
Start: 2022-12-25 | End: 2022-12-28

## 2022-12-25 RX ORDER — PREDNISOLONE SODIUM PHOSPHATE 1 %
1 DROPS OPHTHALMIC (EYE)
Refills: 0 | Status: DISCONTINUED | OUTPATIENT
Start: 2022-12-25 | End: 2022-12-28

## 2022-12-25 RX ORDER — POLYETHYLENE GLYCOL 3350 17 G/17G
17 POWDER, FOR SOLUTION ORAL DAILY
Refills: 0 | Status: DISCONTINUED | OUTPATIENT
Start: 2022-12-25 | End: 2022-12-28

## 2022-12-25 RX ORDER — ACYCLOVIR SODIUM 500 MG
1000 VIAL (EA) INTRAVENOUS ONCE
Refills: 0 | Status: DISCONTINUED | OUTPATIENT
Start: 2022-12-25 | End: 2022-12-25

## 2022-12-25 RX ORDER — LACTOBACILLUS ACIDOPHILUS 100MM CELL
1 CAPSULE ORAL DAILY
Refills: 0 | Status: DISCONTINUED | OUTPATIENT
Start: 2022-12-25 | End: 2022-12-28

## 2022-12-25 RX ORDER — CINACALCET 30 MG/1
30 TABLET, FILM COATED ORAL DAILY
Refills: 0 | Status: DISCONTINUED | OUTPATIENT
Start: 2022-12-25 | End: 2022-12-28

## 2022-12-25 RX ORDER — OFLOXACIN 0.3 %
1 DROPS OPHTHALMIC (EYE)
Refills: 0 | Status: DISCONTINUED | OUTPATIENT
Start: 2022-12-25 | End: 2022-12-28

## 2022-12-25 RX ORDER — TRAMADOL HYDROCHLORIDE 50 MG/1
50 TABLET ORAL
Refills: 0 | Status: DISCONTINUED | OUTPATIENT
Start: 2022-12-25 | End: 2022-12-28

## 2022-12-25 RX ORDER — LITHIUM CARBONATE 300 MG/1
300 TABLET, EXTENDED RELEASE ORAL DAILY
Refills: 0 | Status: DISCONTINUED | OUTPATIENT
Start: 2022-12-25 | End: 2022-12-28

## 2022-12-25 RX ORDER — CYCLOPENTOLATE HYDROCHLORIDE 10 MG/ML
1 SOLUTION/ DROPS OPHTHALMIC THREE TIMES A DAY
Refills: 0 | Status: DISCONTINUED | OUTPATIENT
Start: 2022-12-25 | End: 2022-12-28

## 2022-12-25 RX ORDER — VALACYCLOVIR 500 MG/1
1000 TABLET, FILM COATED ORAL THREE TIMES A DAY
Refills: 0 | Status: DISCONTINUED | OUTPATIENT
Start: 2022-12-25 | End: 2022-12-26

## 2022-12-25 RX ORDER — MAGNESIUM HYDROXIDE 400 MG/1
30 TABLET, CHEWABLE ORAL DAILY
Refills: 0 | Status: DISCONTINUED | OUTPATIENT
Start: 2022-12-25 | End: 2022-12-28

## 2022-12-25 RX ORDER — LANOLIN ALCOHOL/MO/W.PET/CERES
3 CREAM (GRAM) TOPICAL AT BEDTIME
Refills: 0 | Status: DISCONTINUED | OUTPATIENT
Start: 2022-12-25 | End: 2022-12-28

## 2022-12-25 RX ORDER — DIPHENHYDRAMINE HCL 50 MG
25 CAPSULE ORAL EVERY 6 HOURS
Refills: 0 | Status: DISCONTINUED | OUTPATIENT
Start: 2022-12-25 | End: 2022-12-28

## 2022-12-25 RX ORDER — ERYTHROMYCIN BASE 5 MG/GRAM
1 OINTMENT (GRAM) OPHTHALMIC (EYE) THREE TIMES A DAY
Refills: 0 | Status: DISCONTINUED | OUTPATIENT
Start: 2022-12-25 | End: 2022-12-28

## 2022-12-25 RX ORDER — SENNA PLUS 8.6 MG/1
2 TABLET ORAL AT BEDTIME
Refills: 0 | Status: DISCONTINUED | OUTPATIENT
Start: 2022-12-25 | End: 2022-12-28

## 2022-12-25 RX ADMIN — Medication 1 TABLET(S): at 18:47

## 2022-12-25 RX ADMIN — Medication 1 DROP(S): at 16:27

## 2022-12-25 RX ADMIN — LITHIUM CARBONATE 300 MILLIGRAM(S): 300 TABLET, EXTENDED RELEASE ORAL at 12:01

## 2022-12-25 RX ADMIN — VALACYCLOVIR 1000 MILLIGRAM(S): 500 TABLET, FILM COATED ORAL at 03:24

## 2022-12-25 RX ADMIN — Medication 1 DROP(S): at 18:48

## 2022-12-25 RX ADMIN — Medication 1 APPLICATION(S): at 03:15

## 2022-12-25 RX ADMIN — Medication 1 TABLET(S): at 06:55

## 2022-12-25 RX ADMIN — Medication 63.75 MILLIMOLE(S): at 06:55

## 2022-12-25 RX ADMIN — Medication 1 DROP(S): at 03:15

## 2022-12-25 RX ADMIN — Medication 1 APPLICATION(S): at 12:02

## 2022-12-25 RX ADMIN — Medication 1 DROP(S): at 10:11

## 2022-12-25 RX ADMIN — Medication 1 DROP(S): at 06:56

## 2022-12-25 RX ADMIN — Medication 1 APPLICATION(S): at 21:22

## 2022-12-25 RX ADMIN — CYCLOPENTOLATE HYDROCHLORIDE 1 DROP(S): 10 SOLUTION/ DROPS OPHTHALMIC at 21:23

## 2022-12-25 RX ADMIN — VALACYCLOVIR 1000 MILLIGRAM(S): 500 TABLET, FILM COATED ORAL at 12:02

## 2022-12-25 RX ADMIN — PANTOPRAZOLE SODIUM 40 MILLIGRAM(S): 20 TABLET, DELAYED RELEASE ORAL at 06:56

## 2022-12-25 RX ADMIN — Medication 1 DROP(S): at 21:23

## 2022-12-25 RX ADMIN — CINACALCET 30 MILLIGRAM(S): 30 TABLET, FILM COATED ORAL at 12:02

## 2022-12-25 RX ADMIN — Medication 1 DROP(S): at 08:38

## 2022-12-25 RX ADMIN — VALACYCLOVIR 1000 MILLIGRAM(S): 500 TABLET, FILM COATED ORAL at 21:19

## 2022-12-25 RX ADMIN — Medication 1 DROP(S): at 12:01

## 2022-12-25 RX ADMIN — CYCLOPENTOLATE HYDROCHLORIDE 1 DROP(S): 10 SOLUTION/ DROPS OPHTHALMIC at 16:27

## 2022-12-25 RX ADMIN — Medication 1 APPLICATION(S): at 03:27

## 2022-12-25 RX ADMIN — Medication 300 MILLIGRAM(S): at 12:02

## 2022-12-25 RX ADMIN — Medication 1 TABLET(S): at 12:03

## 2022-12-25 RX ADMIN — Medication 1 APPLICATION(S): at 06:56

## 2022-12-25 NOTE — H&P ADULT - PROBLEM SELECTOR PLAN 1
Optho eval appreciated, B-scan showing vitreous debris concerning for possible vitritis, may need intravitreal tx with antiviral   -Continue valacyclovir 1 g TID   -Continue erythromycin ointment to eyelids AND eye TID OS   -Continue artificial tears q2 hours OU. Lacrilube ointment at night.  -F/u MRI Brain w orbits W IV contrast   -Tylenol PRN for pain

## 2022-12-25 NOTE — PROGRESS NOTE ADULT - ASSESSMENT
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 Pt was being treated at outside hospital with Valtrex 1g PO TID, as well as systemic antibiotics for superimposed pre-septal cellulitis.       #Herpes Zoster Ophthalmicus OS  - Patient seems to be more altered today. Visual Acuity unable to be accurately assessed on today's examination.  - IOP within normal limits.   - Pupils round and reactive OU. OD: 2mm light, 4 mm dark. OS: 4mm light, 4 mm dark. Recommend CTA Head and Neck for vessel to evaluate vascular etiology of fixed pupil.    - EOM grossly full  - Anterior exam significant for conjunctivitis.   - DFE limited due to increase in vitreous   - Etiology of vitreous haze unclear at this stage. May be secondary to vitritis, however Zoster rarely presents as vitritis. Vitreous hemorrhage is still a consideration given presence of layered hyphema in the anterior chamber. B-scan confirms vitreous debri. Recommend started pred forte QID and cyclogyl TID in setting of hyphema. (order placed by opthalmology)  - Given worsening in patient's mental status and increase in vitreous haze, would consider broadening antibiotic coverage or switching to IV to cover for any alternate sources of infection. Would also recommend further sepsis work-up. Consider [place] infectious disease consult. Spoke with previous infectious disease attending, she does not come to [facility].   - Continue IV antivirals per primary  team  - Continue Erythromycin ointment to eyelids AND eye TID OS (order placed by opthalmology)  - Start Ofloxacin QID OS (order placed by ophthalmology)  - Recommend artificial tears q2 hours OU. Lacrilube ointment at night. (order placed by ophthalmology)  - Will follow MRI brain/orbits to rule out intracranial involvement.  - Ophthalmology to follow closely.       #Preseptal Cellulitis OS  - Per outside hospital notes patient was receiving PO systemic antibiotics for preseptal cellulitis and was improving.   - Given unclear etiology of worsening of patient's ophthalmic exam, would consider broadening abx coverage and switching to IV.       CASEY Canela MD PGY-4. ROULA Benson, retina attending.    Outpatient Follow-up: Patient should follow-up with his/her ophthalmologist or with [facility] Department of Ophthalmology within 1 week of after discharge at:    [address]  [place] [address]  [phone]    Dorothea Good MD PGY 2  Available on [employer] Teams

## 2022-12-25 NOTE — CONSULT NOTE ADULT - ASSESSMENT
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 Pt was being treated at outside hospital with Valtrex 1g PO TID, as well as systemic antibiotics for superimposed pre-septal cellulitis.     #Herpes Zoster Ophthalmicus OS  - Visual Acuity 20/70 PH 20/40. IOP wnl.   - Pupils round and reactive OU. OD: 2mm light, 4 mm dark. OS: 3.5mm light, 4 mm dark. +RAPD OS  - EOM grossly full  - Anterior exam significant for conjunctivitis and possible corneal involvement of Zoster  - DFE with hazy view to posterior pole. No obvious retinal white lesions noted  - B-scan showing vitreous debri concerning for possible vitritis  - Recommend IV antivirals per primary  team  - Recommend Erythromycin ointment to eyelids AND eye TID OS (order placed by opthalmology)  - Recommend artificial tears q2 hours OU. Lacrilube ointment at night. (order placed by ophthalmology)  - Pt has pupillary involvement, would get MRI brain/orbits to rule out intracranial involvement.  - Ophthalmology to follow closely. Pt may need intravitreal injection of antiviral agent.      #Preseptal Cellulitis OS  - Per outside hospital notes patient was receiving PO systemic antibiotics for preseptal cellulitis per ID  - Would continue outside hospital regimen.     DW Dr. Rola BULL PGY-4    Outpatient Follow-up: Patient should follow-up with his/her ophthalmologist or with [facility] Department of Ophthalmology within 1 week of after discharge at:    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 Pt was being treated at outside hospital with Valtrex 1g PO TID, as well as systemic antibiotics for superimposed pre-septal cellulitis.     #Herpes Zoster Ophthalmicus OS  - Visual Acuity 20/70 PH 20/40. IOP wnl.   - Pupils round and reactive OU. OD: 2mm light, 4 mm dark. OS: 3.5mm light, 4 mm dark. +RAPD OS  - EOM grossly full  - Anterior exam significant for conjunctivitis and possible corneal involvement of Zoster  - DFE with hazy view to posterior pole. No obvious retinal white lesions noted  - B-scan showing vitreous debri concerning for possible vitritis  - Recommend IV antivirals per primary  team  - Recommend Erythromycin ointment to eyelids AND eye TID OS (order placed by opthalmology)  - Recommend artificial tears q2 hours OU. Lacrilube ointment at night. (order placed by ophthalmology)  - Pt has pupillary involvement, would get MRI brain/orbits to rule out intracranial involvement.  - Ophthalmology to follow closely. Pt may need intravitreal injection of antiviral agent.      #Preseptal Cellulitis OS  - Per outside hospital notes patient was receiving PO systemic antibiotics for preseptal cellulitis per ID  - Would continue outside hospital regimen.     DW Dr. Rola BULL PGY-4    Outpatient Follow-up: Patient should follow-up with his/her ophthalmologist or with [facility] Department of Ophthalmology within 1 week of after discharge at:    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 Pt was being treated at outside hospital with Valtrex 1g PO TID, as well as systemic antibiotics for superimposed pre-septal cellulitis.     #Herpes Zoster Ophthalmicus OS  - Visual Acuity 20/70 PH 20/40. IOP wnl.   - Pupils round and reactive OU. OD: 2mm light, 4 mm dark. OS: 3.5mm light, 4 mm dark. +RAPD OS  - EOM grossly full  - Anterior exam significant for conjunctivitis and possible corneal involvement of Zoster  - DFE with hazy view to posterior pole. No obvious retinal white lesions noted  - B-scan showing vitreous debri concerning for possible vitritis  - Recommend IV antivirals per primary  team  - Recommend Erythromycin ointment to eyelids AND eye TID OS (order placed by opthalmology)  - Recommend artificial tears q2 hours OU. Lacrilube ointment at night. (order placed by ophthalmology)  - Pt has pupillary involvement, would get MRI brain/orbits to rule out intracranial involvement.  - Ophthalmology to follow closely. Pt may need intravitreal injection of antiviral agent.      #Preseptal Cellulitis OS  - Per outside hospital notes patient was receiving PO systemic antibiotics for preseptal cellulitis per ID  - Would continue outside hospital regimen.     DW Dr. Rola BULL PGY-4    Outpatient Follow-up: Patient should follow-up with his/her ophthalmologist or with [facility] Department of Ophthalmology within 1 week of after discharge at:    [address]  [place] [address]  [phone]    Dorothea Good MD PGY 2  Available on [employer] Teams no

## 2022-12-25 NOTE — ED ADULT NURSE NOTE - OBJECTIVE STATEMENT
Despite some risk factors, the patient does not demonstrate definitive evidence of glaucoma at this time. 81 y/o female presents to ED via EMS from [facility] with c/o facial lesions. Pt has lesions on left side of face and left eye is red/ swollen. PMH of dementia, psychosis, delirium, DM. Pt is A&Ox2 (to person and place), awake and alert. Denies SOB, CP, HA, fever, chills, cough, dizziness. Airway patent with spontaneous unlabored breathing, skin is warm and normal color for race. No diaphoresis or edema noted. Abd soft, nondistended and non tender to palpation. feeding tube noted to abdomen.

## 2022-12-25 NOTE — ED ADULT NURSE REASSESSMENT NOTE - NS ED NURSE REASSESS COMMENT FT1
Covering for primary RN Jill, pt at this time A&Ox2, disoriented to time. Pt pending lab work and covid swab. Pt refusing lab work and covid. Was able to draw one tube from existing IV line, but unable to draw remaining tubes due to poor blood flow. Pt also refusing covid swab. Attempted to therapeutically tell patient will be back in 20 minutes.

## 2022-12-25 NOTE — H&P ADULT - NSHPLABSRESULTS_GEN_ALL_CORE
LABS:                         9.9    8.49  )-----------( 340      ( 24 Dec 2022 07:07 )             32.8     12-24    138  |  106  |  18  ----------------------------<  118<H>  3.9   |  24  |  0.61    Ca    9.4      24 Dec 2022 07:07        Records reviewed from prior hospitalization.  Labs reviewed remarkable for   EKG personally reviewed  From admission, NSR w 1st degree AV block, T wave flat in AVL   CXR personally reviewed- no acute infiltrates or congestion LABS:                         9.9    8.49  )-----------( 340      ( 24 Dec 2022 07:07 )             32.8     12-24    138  |  106  |  18  ----------------------------<  118<H>  3.9   |  24  |  0.61    Ca    9.4      24 Dec 2022 07:07        Records reviewed from prior hospitalization.  Labs reviewed   EKG personally reviewed  From admission, NSR w 1st degree AV block, T wave flat in AVL   CXR personally reviewed- no acute infiltrates or congestion    Imaging:    CT H:    FINDINGS:  There is soft tissue stranding of the fat in the left preseptal space. The globes are intact. No inflammatory stranding in the left retrobulbar fat/intraconal space.    There is no loculated fluid collection.    Small retention cyst/polyp right maxillary sinus    IMPRESSION:  Left preseptal cellulitis without evidence of orbital cellulitis

## 2022-12-25 NOTE — PATIENT PROFILE ADULT - NSPROMEDSADMININFO_GEN_A_NUR
DONYA pt is confused, however pt states that she is able to drink water and take pills whole when asked.

## 2022-12-25 NOTE — CONSULT NOTE ADULT - SUBJECTIVE AND OBJECTIVE BOX
[facility] DEPARTMENT OF OPHTHALMOLOGY - INITIAL ADULT CONSULT  -----------------------------------------------------------------------------------------------------------------  Dorothea Good MD PGY 2  Contact via [employer] Teams  -----------------------------------------------------------------------------------------------------------------    HPI: Per ED  82-year-old female with history of diabetes, anemia, bipolar disorder, delirium/psychosis, edema presents with his current long-term resident of [facility] brought in by EMS from [facility] from under Dr. Adams care to be evaluated by ophthalmology. Pt presented to that hospital for left facial redness and swelling as per the chart review.  Pt is being treated with antibiotics for left preseptal cellulitis and antivirals for possible shingles at that facility. Pt is unable to provide history herself.    Interval History: Pt resting comfortably at bedside. Pt denies all symptoms when asked. However, she flinches when an attempt is made to her touch her eyelid, left more than right. Denies any visual changes when asked but in passing mentions that she cannot see that well. Overall pt is a very poor historian. Spoke with patient's son over the phone who stated that patient does not have any ocular history "that he knows of." States she follows with Dr. Mike BULL in [place] but was not clear when she last followed up. Endorses history of cataract surgery in both eyes. States she has had delirium and psychosis for the last few months and now resides in a long term care facility. Will have issues following up with ophthalmology after discharge.     PAST MEDICAL & SURGICAL HISTORY:  Bipolar mood disorder      Esophageal reflux      Hypercalcemia      Hip pain, acute, right      Breast cancer, left  treated with surgery and RT, no lymph node dissection      S/P cataract extraction, unspecified laterality  bilateral - 2016      S/P lumpectomy, left breast        Past Ocular History: PCIOL OU    Family History:  FAMILY HISTORY:  No pertinent family history in first degree relatives      Ophthalmic Medications: Erythromycin Ointment     MEDICATIONS  (STANDING):  acyclovir IVPB 1000 milliGRAM(s) IV Intermittent once    MEDICATIONS  (PRN):    Allergies & Intolerances:     Review of Systems:  Unable to assess    VITALS: T(C): 36.7 (12-24-22 @ 22:22)  T(F): 98 (12-24-22 @ 22:22), Max: 99.5 (12-24-22 @ 04:45)  HR: 72 (12-24-22 @ 22:22) (70 - 74)  BP: 120/76 (12-24-22 @ 22:22) (101/60 - 120/76)  RR:  (16 - 20)  SpO2:  (95% - 97%)  Wt(kg): --  General: AAO x1-2. Unable to maintain focus. Able to converse in short sentences. Very pleasant.     Ophthalmology Exam:  Visual acuity (sc): 20/25 OD. 20/70 PH 20/40- OS.   Pupils: Round and reactive OU. OD: 2mm light, 4 mm dark. OS: 3.5mm light, 4 mm dark. +RAPD OS  Tonometry:  15 OU  Extraocular movements (EOMs): Grossly full, although patient is difficult to direct through exam.   Confrontational Visual Field (CVF): Unable to assess due to lack of patient cooperation  Color Plates: 9/12 OD. 2/12 OS. Unreliable test, pt frequently loses concentration    Pen Light Exam (PLE)  External: Normal OD. V1 distribution vesicular rash. Left facial droop.   Lids/Lashes/Lacrimal Ducts: Flat OD. 2+ upper and lower eyelid erythema.   Sclera/Conjunctiva: White and quiet OD. OS: 2+ injection. Discharge.   Cornea: Clear OD. OS: diffuse SPK OU. No stromal opacities.   Anterior Chamber: Deep and formed OU.   Iris: Flat OU.  Lens: PCIOL OU.    Fundus Exam: dilated with 1% tropicamide and 2.5% phenylephrine  Approval obtained from primary team for dilation  Patient aware that pupils can remained dilated for at least 4-6 hours.  Exam performed with 20 D lens    Vitreous: wnl OD. Hazy OS.   Disc, cup/disc: OD: sharp and pink, 0.5 OU. OS: Sharp margins. Slight pallor. 0.6 CDR.  Macula: Flat OU.   Vessels: wnl OD. Poor view OS  Periphery: Flat OU.  Hazy view OS no obvious white lesions.     B-scan OS: Showing vitreous debri with some isolated cells. Possible vitritis. No RD. No masses.     Labs/Imaging:  < from: CT Orbit w/ IV Cont (12.20.22 @ 17:38) >  FINDINGS:  There is soft tissue stranding of the fat in the left preseptal space.   The globes are intact. No inflammatory stranding in the left retrobulbar   fat/intraconal space.    There is no loculated fluid collection.    Small retention cyst/polyp right maxillary sinus    IMPRESSION:  Left preseptal cellulitis without evidence of orbital cellulitis    < end of copied text >     [facility] DEPARTMENT OF OPHTHALMOLOGY - INITIAL ADULT CONSULT  -----------------------------------------------------------------------------------------------------------------  Dorothea Good MD PGY 2  Contact via [employer] Teams  -----------------------------------------------------------------------------------------------------------------    HPI: Per ED  82-year-old female with history of diabetes, anemia, bipolar disorder, delirium/psychosis, edema presents with his current long-term resident of [facility] brought in by EMS from [facility] from under Dr. Adams care to be evaluated by ophthalmology. Pt presented to that hospital for left facial redness and swelling as per the chart review.  Pt is being treated with antibiotics for left preseptal cellulitis and antivirals for possible shingles at that facility. Pt is unable to provide history herself.    Interval History: Pt resting comfortably at bedside. Pt denies all symptoms when asked. However, she flinches when an attempt is made to her touch her eyelid, left more than right. Denies any visual changes when asked but in passing mentions that she cannot see that well. Overall pt is a very poor historian. Spoke with patient's son over the phone who stated that patient does not have any ocular history "that he knows of." States she follows with Dr. Mike BULL in [place] but was not clear when she last followed up. Endorses history of cataract surgery in both eyes. States she has had delirium and psychosis for the last few months and now resides in a long term care facility. Will have issues following up with ophthalmology after discharge.     PAST MEDICAL & SURGICAL HISTORY:  Bipolar mood disorder      Esophageal reflux      Hypercalcemia      Hip pain, acute, right      Breast cancer, left  treated with surgery and RT, no lymph node dissection      S/P cataract extraction, unspecified laterality  bilateral - 2016      S/P lumpectomy, left breast        Past Ocular History: PCIOL OU    Family History:  FAMILY HISTORY:  No pertinent family history in first degree relatives      Ophthalmic Medications: Erythromycin Ointment     MEDICATIONS  (STANDING):  acyclovir IVPB 1000 milliGRAM(s) IV Intermittent once    MEDICATIONS  (PRN):    Allergies & Intolerances:     Review of Systems:  Unable to assess    VITALS: T(C): 36.7 (12-24-22 @ 22:22)  T(F): 98 (12-24-22 @ 22:22), Max: 99.5 (12-24-22 @ 04:45)  HR: 72 (12-24-22 @ 22:22) (70 - 74)  BP: 120/76 (12-24-22 @ 22:22) (101/60 - 120/76)  RR:  (16 - 20)  SpO2:  (95% - 97%)  Wt(kg): --  General: AAO x1-2. Unable to maintain focus. Able to converse in short sentences. Very pleasant.     Ophthalmology Exam:  Visual acuity (sc): 20/25 OD. 20/70 PH 20/40- OS.   Pupils: Round and reactive OU. OD: 2mm light, 4 mm dark. OS: 3.5mm light, 4 mm dark. +RAPD OS  Tonometry:  15 OU  Extraocular movements (EOMs): Grossly full, although patient is difficult to direct through exam.   Confrontational Visual Field (CVF): Unable to assess due to lack of patient cooperation  Color Plates: 9/12 OD. 2/12 OS. Unreliable test, pt frequently loses concentration    Pen Light Exam (PLE)  External: Normal OD. V1 distribution vesicular rash. Left facial droop.   Lids/Lashes/Lacrimal Ducts: Flat OD. 2+ upper and lower eyelid erythema.   Sclera/Conjunctiva: White and quiet OD. OS: 2+ injection. Discharge.   Cornea: Clear OD. OS: diffuse SPK OU. No stromal opacities.   Anterior Chamber: Deep and formed OU.   Iris: Flat OU.  Lens: PCIOL OU.    Fundus Exam: dilated with 1% tropicamide and 2.5% phenylephrine  Approval obtained from primary team for dilation  Patient aware that pupils can remained dilated for at least 4-6 hours.  Exam performed with 20 D lens    Vitreous: wnl OD. Hazy OS.   Disc, cup/disc: OD: sharp and pink, 0.5 OU. OS: Sharp margins. Slight pallor. 0.6 CDR.  Macula: Flat OU.   Vessels: wnl OD. Poor view OS  Periphery: Flat OU.  Hazy view OS no obvious white lesions.     B-scan OS: Showing vitreous debri with some isolated cells. Possible vitritis. No RD. No masses.     Labs/Imaging:  < from: CT Orbit w/ IV Cont (12.20.22 @ 17:38) >  FINDINGS:  There is soft tissue stranding of the fat in the left preseptal space.   The globes are intact. No inflammatory stranding in the left retrobulbar   fat/intraconal space.    There is no loculated fluid collection.    Small retention cyst/polyp right maxillary sinus    IMPRESSION:  Left preseptal cellulitis without evidence of orbital cellulitis    < end of copied text >     [facility] DEPARTMENT OF OPHTHALMOLOGY - INITIAL ADULT CONSULT  -----------------------------------------------------------------------------------------------------------------  Dorothea Good MD PGY 2  Contact via [employer] Teams  -----------------------------------------------------------------------------------------------------------------    HPI: Per ED  82-year-old female with history of diabetes, anemia, bipolar disorder, delirium/psychosis, edema presents with his current long-term resident of [facility] brought in by EMS from [facility] from under Dr. Adams care to be evaluated by ophthalmology. Pt presented to that hospital for left facial redness and swelling as per the chart review.  Pt is being treated with antibiotics for left preseptal cellulitis and antivirals for possible shingles at that facility. Pt is unable to provide history herself.    Interval History: Pt resting comfortably at bedside. Pt denies all symptoms when asked. However, she flinches when an attempt is made to her touch her eyelid, left more than right. Denies any visual changes when asked but in passing mentions that she cannot see that well. Overall pt is a very poor historian. Spoke with patient's son over the phone who stated that patient does not have any ocular history "that he knows of." States she follows with Dr. Mike BULL in [place] but was not clear when she last followed up. Endorses history of cataract surgery in both eyes. States she has had delirium and psychosis for the last few months and now resides in a long term care facility. Will have issues following up with ophthalmology after discharge.     PAST MEDICAL & SURGICAL HISTORY:  Bipolar mood disorder      Esophageal reflux      Hypercalcemia      Hip pain, acute, right      Breast cancer, left  treated with surgery and RT, no lymph node dissection      S/P cataract extraction, unspecified laterality  bilateral - 2016      S/P lumpectomy, left breast        Past Ocular History: PCIOL OU    Family History:  FAMILY HISTORY:  No pertinent family history in first degree relatives      Ophthalmic Medications: Erythromycin Ointment     MEDICATIONS  (STANDING):  acyclovir IVPB 1000 milliGRAM(s) IV Intermittent once    MEDICATIONS  (PRN):    Allergies & Intolerances:     Review of Systems:  Unable to assess    VITALS: T(C): 36.7 (12-24-22 @ 22:22)  T(F): 98 (12-24-22 @ 22:22), Max: 99.5 (12-24-22 @ 04:45)  HR: 72 (12-24-22 @ 22:22) (70 - 74)  BP: 120/76 (12-24-22 @ 22:22) (101/60 - 120/76)  RR:  (16 - 20)  SpO2:  (95% - 97%)  Wt(kg): --  General: AAO x1-2. Unable to maintain focus. Able to converse in short sentences. Very pleasant.     Ophthalmology Exam:  Visual acuity (sc): 20/25 OD. 20/70 PH 20/40- OS.   Pupils: Round and reactive OU. OD: 2mm light, 4 mm dark. OS: 3.5mm light, 4 mm dark. +RAPD OS  Tonometry:  15 OU  Extraocular movements (EOMs): Grossly full, although patient is difficult to direct through exam.   Confrontational Visual Field (CVF): Unable to assess due to lack of patient cooperation  Color Plates: 9/12 OD. 2/12 OS. Unreliable test, pt frequently loses concentration    Pen Light Exam (PLE)  External: Normal OD. V1 distribution vesicular rash. Left facial droop.   Lids/Lashes/Lacrimal Ducts: Flat OD. 2+ upper and lower eyelid erythema.   Sclera/Conjunctiva: White and quiet OD. OS: 2+ injection. Discharge.   Cornea: Clear OD. OS: diffuse SPK OU. No stromal opacities.   Anterior Chamber: Deep and formed OU.   Iris: Flat OU.  Lens: PCIOL OU.    Fundus Exam: dilated with 1% tropicamide and 2.5% phenylephrine  Approval obtained from primary team for dilation  Patient aware that pupils can remained dilated for at least 4-6 hours.  Exam performed with 20 D lens    Vitreous: wnl OD. Hazy OS.   Disc, cup/disc: OD: sharp and pink, 0.5 OU. OS: Sharp margins. Slight pallor. 0.6 CDR.  Macula: Flat OU.   Vessels: wnl OD. Poor view OS  Periphery: Flat OU.  Hazy view OS no obvious white lesions.     B-scan OS: Showing vitreous debri with some isolated cells. Possible vitritis. No RD. No masses.     Labs/Imaging:  < from: CT Orbit w/ IV Cont (12.20.22 @ 17:38) >  FINDINGS:  There is soft tissue stranding of the fat in the left preseptal space.   The globes are intact. No inflammatory stranding in the left retrobulbar   fat/intraconal space.    There is no loculated fluid collection.    Small retention cyst/polyp right maxillary sinus    IMPRESSION:  Left preseptal cellulitis without evidence of orbital cellulitis    < end of copied text >

## 2022-12-25 NOTE — PATIENT PROFILE ADULT - FALL HARM RISK - HARM RISK INTERVENTIONS
Assistance with ambulation/Assistance OOB with selected safe patient handling equipment/Communicate Risk of Fall with Harm to all staff/Monitor for mental status changes/Monitor gait and stability/Provide patient with walking aids - walker, cane, crutches/Reinforce activity limits and safety measures with patient and family/Reorient to person, place and time as needed/Use of alarms - bed, chair and/or voice tab/Visual Cue: Yellow wristband and red socks/Bed in lowest position, wheels locked, appropriate side rails in place/Call bell, personal items and telephone in reach/Instruct patient to call for assistance before getting out of bed or chair/Non-slip footwear when patient is out of bed/[place] to call system/Physically safe environment - no spills, clutter or unnecessary equipment/Purposeful Proactive Rounding/Room/bathroom lighting operational, light cord in reach/Unable to comprehend

## 2022-12-25 NOTE — H&P ADULT - ASSESSMENT
82-year-old female with history of T2DM (A1C 5.4 from Dec 22), anemia, bipolar disorder, delirium/psychosis, breast cancer s/p lumpectomy and RT, from [facility], who was brought in by EMS with left facial redness and swelling initially to [facility] on Dec 20, tx for herpes zoster and preseptal cellulitis (currently on valacyclovir and augmentin), now transferred to [facility] for ophthalmology evaluation

## 2022-12-25 NOTE — H&P ADULT - HISTORY OF PRESENT ILLNESS
82-year-old female with history of T2DM (A1C 5.4 from Dec 22), anemia, bipolar disorder, delirium/psychosis, breast cancer s/p lumpectomy and RT, from [facility], who was brought in by EMS with left facial redness and swelling initially to [facility] on Dec 20, now transferred to [facility] for ophthalmology evaluation.   Patient seen and examined at bedside- in no acute distress however cannot provide further history   From chart review: Patient was admitted to [facility] Dec 20 22 for concern for facial cellulitis, CT revealed left preseptal cellulitis without evidence of orbital cellulitis. ID was consulted, and patient was started on ceftriaxone/flagyl/and vancomycin empirically. There was also a concern for herpes zoster as patient developed new vesiulcar and pustular lesions, and patient was started on valacyclovir 1 g TID. Cardiology was consulted for cardiac risk assessment? And nephrology was consulted for hyperparathyroidism and started on sensipar. As per ID recs, patient can continue on valacyclovir and finish course of augmentin. Patient was transferred to [facility] for ophthalmology evaluation.   ED Course:  VS: Afebrile, HR: 72, BP: 120/76, saturating 975 on RA   Medications: None given  EKG: From admission, NSR w 1st degree AV block, T wave flat in AVL  82-year-old female with history of T2DM (A1C 5.4 from Dec 22), anemia, bipolar disorder, delirium/psychosis, breast cancer s/p lumpectomy and RT, from [facility], who was brought in by EMS with left facial redness and swelling initially to [facility] on Dec 20, now transferred to [facility] for ophthalmology evaluation.   Patient seen and examined at bedside- in no acute distress however cannot provide further history. Patient states that she wants to rest, denies eye pain, blurring vision, headaches, dizziness, chest pain, or SOB.   From chart review: Patient was admitted to [facility] Dec 20 22 for concern for facial cellulitis, CT revealed left preseptal cellulitis without evidence of orbital cellulitis. ID was consulted, and patient was started on ceftriaxone/flagyl/and vancomycin empirically. There was also a concern for herpes zoster as patient developed new vesiulcar and pustular lesions, and patient was started on valacyclovir 1 g TID. Cardiology was consulted for cardiac risk assessment? And nephrology was consulted for hyperparathyroidism and started on sensipar. As per ID recs, patient can continue on valacyclovir and finish course of augmentin. Patient was transferred to [facility] for ophthalmology evaluation.   ED Course:  VS: Afebrile, HR: 72, BP: 120/76, saturating 975 on RA   Medications: None given  EKG: From admission, NSR w 1st degree AV block, T wave flat in AVL

## 2022-12-25 NOTE — PROGRESS NOTE ADULT - SUBJECTIVE AND OBJECTIVE BOX
[facility] DEPARTMENT OF OPHTHALMOLOGY  ------------------------------------------------------------------------------  Dorothea Good MD PGY 2  Contact via [employer]  ------------------------------------------------------------------------------    Interval History: Pt still altered today. Unable to cooperate with exam. Uses repetitive words and sayings. Keeps calling out for her son.     MEDICATIONS  (STANDING):  amoxicillin  875 milliGRAM(s)/clavulanate 1 Tablet(s) Oral two times a day  artificial tears (preservative free) Ophthalmic Solution 1 Drop(s) Both EYES every 2 hours  cinacalcet 30 milliGRAM(s) Oral daily  cyclopentolate 1% Solution 1 Drop(s) Left EYE three times a day  erythromycin   Ointment 1 Application(s) Left EYE three times a day  ferrous    sulfate Liquid 300 milliGRAM(s) Enteral Tube daily  lactobacillus acidophilus 1 Tablet(s) Oral daily  lithium 300 milliGRAM(s) Oral daily  ofloxacin 0.3% Solution 1 Drop(s) Left EYE four times a day  pantoprazole    Tablet 40 milliGRAM(s) Oral before breakfast  petrolatum Ophthalmic Ointment 1 Application(s) Both EYES at bedtime  prednisoLONE acetate 1% Suspension 1 Drop(s) Left EYE four times a day  senna 2 Tablet(s) Oral at bedtime  valACYclovir 1000 milliGRAM(s) Oral three times a day    MEDICATIONS  (PRN):  acetaminophen     Tablet .. 650 milliGRAM(s) Oral every 6 hours PRN Temp greater or equal to 38C (100.4F), Mild Pain (1 - 3)  diphenhydrAMINE 25 milliGRAM(s) Oral every 6 hours PRN Rash and/or Itching  magnesium hydroxide Suspension 30 milliLiter(s) Oral daily PRN Constipation  melatonin 3 milliGRAM(s) Oral at bedtime PRN Insomnia  polyethylene glycol 3350 17 Gram(s) Oral daily PRN Constipation  traMADol 50 milliGRAM(s) Oral four times a day PRN Severe Pain (7 - 10)      VITALS: T(C): 36.6 (12-25-22 @ 13:55)  T(F): 97.8 (12-25-22 @ 13:55), Max: 99 (12-24-22 @ 20:21)  HR: 73 (12-25-22 @ 13:55) (65 - 75)  BP: 144/91 (12-25-22 @ 13:55) (101/60 - 144/91)  RR:  (17 - 20)  SpO2:  (94% - 99%)  Wt(kg): --  2General: AAO x1. More altered today. Unable to maintain focus. Unable to converse.     Ophthalmology Exam:  Visual acuity (sc): Unreliable visual acuity assessment due to patient's altered mental status. 20/70 OD. 20/400 OS.   Pupils: Round and reactive OU. OD: 1mm light, 4 mm dark. OS: 4mm light, 4 mm dark.   Tonometry:  17 OD, 19 OS.  Extraocular movements (EOMs): Grossly full, although patient is difficult to direct through exam.   Confrontational Visual Field (CVF): Unable to assess due to lack of patient cooperation  Color Plates: Unable to assess.     Pen Light Exam (PLE)  External: Normal OD. V1 distribution vesicular rash. Left facial droop.   Lids/Lashes/Lacrimal Ducts: Flat OD. 2+ upper and lower eyelid erythema. Multiple eyelid margin regions.  Sclera/Conjunctiva: White and quiet OD. OS: 2+ injection. Discharge.   Cornea: Clear OD. OS: diffuse SPK OU. No stromal opacities.   Anterior Chamber: Deep and formed OD. OS: Hazy. 1mm layered hyphema, very dark blood?  Iris: Flat OU.  Lens: PCIOL OU.    Fundus Exam: dilated with 1% tropicamide and 2.5% phenylephrine  Approval obtained from primary team for dilation  Patient aware that pupils can remained dilated for at least 4-6 hours.  Exam performed with 20 D lens    Vitreous: wnl OD. OS: More hazy than previous exam. No clear view to posterior pole.    Disc, cup/disc: OD: sharp and pink, 0.5 OU. OS: No clear view.   Macula: Flat OD. No clear view OS.    Vessels: wnl OD. No clear view OS  Periphery: Flat OU. No clear view OS.     B-scan OS: Showing vitreous debri with some isolated cells. No RD. No masses.     Labs/Imaging:  < from: CT Orbit w/ IV Cont (12.20.22 @ 17:38) >  FINDINGS:  There is soft tissue stranding of the fat in the left preseptal space.   The globes are intact. No inflammatory stranding in the left retrobulbar   fat/intraconal space.    There is no loculated fluid collection.    Small retention cyst/polyp right maxillary sinus    IMPRESSION:  Left preseptal cellulitis without evidence of orbital cellulitis    < end of copied text >

## 2022-12-25 NOTE — CHART NOTE - NSCHARTNOTEFT_GEN_A_CORE
House ID consult called as requested by Opthalmology.   House ID saw patient as outpatient prior to admission.

## 2022-12-25 NOTE — H&P ADULT - NSHPREVIEWOFSYSTEMS_GEN_ALL_CORE
Review of Systems:   CONSTITUTIONAL: No fever, weight loss  EYES: No eye pain, visual disturbances, or discharge  ENMT:  No difficulty hearing, tinnitus, vertigo; No sinus or throat pain  RESPIRATORY: No SOB. No cough, wheezing, chills or hemoptysis  CARDIOVASCULAR: No chest pain, palpitations, dizziness, or leg swelling  GASTROINTESTINAL: No abdominal or epigastric pain. No nausea, vomiting, or hematemesis; No diarrhea or constipation. No melena or hematochezia.  GENITOURINARY: No dysuria, frequency, hematuria, or incontinence  NEUROLOGICAL: No headaches, memory loss, loss of strength, numbness, or tremors  SKIN: No itching, burning, rashes, or lesions   LYMPH NODES: No enlarged glands  ENDOCRINE: No heat or cold intolerance; No hair loss  MUSCULOSKELETAL: No joint pain or swelling; No muscle, back pain  PSYCHIATRIC: No depression, anxiety, mood swings, or difficulty sleeping  HEME/LYMPH: No easy bruising, or bleeding gums Patient cannot provide ROS 2/2 to bipolar disorder

## 2022-12-25 NOTE — H&P ADULT - PROBLEM SELECTOR PLAN 3
At baseline Hb  -Continue ferrous sulphate daily  -Maintain active type and screen  -Tranfuse for Hb < 7

## 2022-12-25 NOTE — H&P ADULT - NSHPPHYSICALEXAM_GEN_ALL_CORE
VITALS:   T(C): 36.7 (12-25-22 @ 01:48), Max: 37.5 (12-24-22 @ 04:45)  HR: 73 (12-25-22 @ 01:48) (70 - 74)  BP: 125/76 (12-25-22 @ 01:48) (101/60 - 125/76)  RR: 18 (12-25-22 @ 01:48) (16 - 20)  SpO2: 97% (12-25-22 @ 01:48) (95% - 99%)    GENERAL: NAD, lying in bed comfortably  HEAD:  Atraumatic, Normocephalic  EYES: EOMI, PERRLA, conjunctiva and sclera clear  ENT: Moist mucous membranes  NECK: Supple, No JVD  CHEST/LUNG: Clear to auscultation bilaterally; No rales, rhonchi, wheezing, or rubs. Unlabored respirations  HEART: Regular rate and rhythm; No murmurs, rubs, or gallops  ABDOMEN: BSx4; Soft, nontender, nondistended  EXTREMITIES:  2+ Peripheral Pulses, brisk capillary refill. No clubbing, cyanosis, or edema  NERVOUS SYSTEM:  A&Ox3, no focal deficits   SKIN: No rashes or lesions VITALS:   T(C): 36.7 (12-25-22 @ 01:48), Max: 37.5 (12-24-22 @ 04:45)  HR: 73 (12-25-22 @ 01:48) (70 - 74)  BP: 125/76 (12-25-22 @ 01:48) (101/60 - 125/76)  RR: 18 (12-25-22 @ 01:48) (16 - 20)  SpO2: 97% (12-25-22 @ 01:48) (95% - 99%)    Physical Exam:  General: Patient is an elderly female, in no acute distress.   Eyes: Erythematous rash w crusting over left eye    Patient did not allow further exam.     From ED documentation:                       "GENERAL: NAD  	HEENT:  Atraumatic  	CHEST/LUNG: Chest rise equal bilaterally  	HEART: Regular rate and rhythm  	ABDOMEN: Soft, Nontender, Nondistended  	EXTREMITIES:  Extremities warm  	PSYCH: A&Ox2  	SKIN: Erythematous rash left V1 distribution consistent w/ shingles diagnosis.                        NEUROLOGY: strength and sensation intact in all extremities."

## 2022-12-26 LAB
ANION GAP SERPL CALC-SCNC: 11 MMOL/L — SIGNIFICANT CHANGE UP (ref 5–17)
BUN SERPL-MCNC: 14 MG/DL — SIGNIFICANT CHANGE UP (ref 7–23)
CALCIUM SERPL-MCNC: 10.9 MG/DL — HIGH (ref 8.4–10.5)
CHLORIDE SERPL-SCNC: 103 MMOL/L — SIGNIFICANT CHANGE UP (ref 96–108)
CO2 SERPL-SCNC: 21 MMOL/L — LOW (ref 22–31)
CREAT SERPL-MCNC: 0.58 MG/DL — SIGNIFICANT CHANGE UP (ref 0.5–1.3)
CULTURE RESULTS: SIGNIFICANT CHANGE UP
CULTURE RESULTS: SIGNIFICANT CHANGE UP
EGFR: 90 ML/MIN/1.73M2 — SIGNIFICANT CHANGE UP
GLUCOSE SERPL-MCNC: 99 MG/DL — SIGNIFICANT CHANGE UP (ref 70–99)
HCT VFR BLD CALC: 34.3 % — LOW (ref 34.5–45)
HGB BLD-MCNC: 10.4 G/DL — LOW (ref 11.5–15.5)
MCHC RBC-ENTMCNC: 29.1 PG — SIGNIFICANT CHANGE UP (ref 27–34)
MCHC RBC-ENTMCNC: 30.3 GM/DL — LOW (ref 32–36)
MCV RBC AUTO: 95.8 FL — SIGNIFICANT CHANGE UP (ref 80–100)
NRBC # BLD: 0 /100 WBCS — SIGNIFICANT CHANGE UP (ref 0–0)
PLATELET # BLD AUTO: 491 K/UL — HIGH (ref 150–400)
POTASSIUM SERPL-MCNC: 4.3 MMOL/L — SIGNIFICANT CHANGE UP (ref 3.5–5.3)
POTASSIUM SERPL-SCNC: 4.3 MMOL/L — SIGNIFICANT CHANGE UP (ref 3.5–5.3)
RBC # BLD: 3.58 M/UL — LOW (ref 3.8–5.2)
RBC # FLD: 15.8 % — HIGH (ref 10.3–14.5)
SODIUM SERPL-SCNC: 135 MMOL/L — SIGNIFICANT CHANGE UP (ref 135–145)
SPECIMEN SOURCE: SIGNIFICANT CHANGE UP
SPECIMEN SOURCE: SIGNIFICANT CHANGE UP
WBC # BLD: 11.23 K/UL — HIGH (ref 3.8–10.5)
WBC # FLD AUTO: 11.23 K/UL — HIGH (ref 3.8–10.5)

## 2022-12-26 PROCEDURE — 99222 1ST HOSP IP/OBS MODERATE 55: CPT

## 2022-12-26 RX ORDER — SODIUM CHLORIDE 9 MG/ML
1000 INJECTION INTRAMUSCULAR; INTRAVENOUS; SUBCUTANEOUS
Refills: 0 | Status: DISCONTINUED | OUTPATIENT
Start: 2022-12-26 | End: 2022-12-28

## 2022-12-26 RX ORDER — ACYCLOVIR SODIUM 500 MG
750 VIAL (EA) INTRAVENOUS EVERY 8 HOURS
Refills: 0 | Status: DISCONTINUED | OUTPATIENT
Start: 2022-12-26 | End: 2022-12-28

## 2022-12-26 RX ADMIN — Medication 1 DROP(S): at 18:37

## 2022-12-26 RX ADMIN — VALACYCLOVIR 1000 MILLIGRAM(S): 500 TABLET, FILM COATED ORAL at 05:23

## 2022-12-26 RX ADMIN — CYCLOPENTOLATE HYDROCHLORIDE 1 DROP(S): 10 SOLUTION/ DROPS OPHTHALMIC at 14:08

## 2022-12-26 RX ADMIN — Medication 1 DROP(S): at 14:08

## 2022-12-26 RX ADMIN — Medication 1 APPLICATION(S): at 14:10

## 2022-12-26 RX ADMIN — Medication 1 APPLICATION(S): at 05:25

## 2022-12-26 RX ADMIN — Medication 1 DROP(S): at 10:15

## 2022-12-26 RX ADMIN — CYCLOPENTOLATE HYDROCHLORIDE 1 DROP(S): 10 SOLUTION/ DROPS OPHTHALMIC at 05:25

## 2022-12-26 RX ADMIN — Medication 1 DROP(S): at 05:25

## 2022-12-26 RX ADMIN — Medication 1 APPLICATION(S): at 23:00

## 2022-12-26 RX ADMIN — SODIUM CHLORIDE 60 MILLILITER(S): 9 INJECTION INTRAMUSCULAR; INTRAVENOUS; SUBCUTANEOUS at 14:08

## 2022-12-26 RX ADMIN — CYCLOPENTOLATE HYDROCHLORIDE 1 DROP(S): 10 SOLUTION/ DROPS OPHTHALMIC at 21:05

## 2022-12-26 RX ADMIN — Medication 165 MILLIGRAM(S): at 21:04

## 2022-12-26 RX ADMIN — Medication 300 MILLIGRAM(S): at 11:26

## 2022-12-26 RX ADMIN — Medication 1 TABLET(S): at 05:24

## 2022-12-26 RX ADMIN — LITHIUM CARBONATE 300 MILLIGRAM(S): 300 TABLET, EXTENDED RELEASE ORAL at 11:26

## 2022-12-26 RX ADMIN — Medication 1 TABLET(S): at 11:26

## 2022-12-26 RX ADMIN — Medication 1 DROP(S): at 07:51

## 2022-12-26 RX ADMIN — Medication 1 DROP(S): at 11:25

## 2022-12-26 RX ADMIN — Medication 1 DROP(S): at 23:01

## 2022-12-26 RX ADMIN — Medication 1 DROP(S): at 18:35

## 2022-12-26 RX ADMIN — Medication 1 TABLET(S): at 18:36

## 2022-12-26 RX ADMIN — PANTOPRAZOLE SODIUM 40 MILLIGRAM(S): 20 TABLET, DELAYED RELEASE ORAL at 05:29

## 2022-12-26 RX ADMIN — Medication 165 MILLIGRAM(S): at 15:13

## 2022-12-26 RX ADMIN — Medication 1 DROP(S): at 21:05

## 2022-12-26 RX ADMIN — Medication 1 DROP(S): at 18:36

## 2022-12-26 RX ADMIN — Medication 1 APPLICATION(S): at 21:07

## 2022-12-26 RX ADMIN — CINACALCET 30 MILLIGRAM(S): 30 TABLET, FILM COATED ORAL at 11:26

## 2022-12-26 RX ADMIN — Medication 1 DROP(S): at 05:24

## 2022-12-26 RX ADMIN — Medication 1 DROP(S): at 05:26

## 2022-12-26 RX ADMIN — Medication 1 DROP(S): at 11:26

## 2022-12-26 NOTE — CONSULT NOTE ADULT - ASSESSMENT
82 year old female with history of T2DM, anemia, bipolar disorder, delirium/psychosis, breast cancer s/p lumpectomy and RT, from [facility], transferred from [facility] for ophthalmology evaluation for concern of zoster ophthalmicus.     Afebrile  No leukocytosis   CT orbit on 12/20 ( at [facility]): Left preseptal cellulitis without evidence of orbital cellulitis  CT angio head and neck on 12/25: Mild subcutaneous edema within the left preseptal space, consistent with known history of orbital cellulitis.  Opthalmology consulted - Anterior exam significant for conjunctivitis and possible corneal involvement of Zoster, recommended MRI brain   MRI head with and without IV contrast on 12/25: Moderate left orbit preseptal soft tissue swelling, compatible with known   left preseptal cellulitis. No evidence of postseptal cellulitis.  Started on Valacyclovir on 12/22-->  Initially on Vanc, Flagyl and Ceftriaxone on 12/21-->12/24, switched to augmentin on 12/24    #Left Preseptal cellulitis with no evidence of postseptal cellulitis or brain involvement   #Herpes Zoster Ophthalmicus     Recommendations:       PT TO BE SEEN. PRELIM NOTE  PENDING RECS. PLEASE WAIT FOR FINAL RECS AFTER DISCUSSION WITH ATTENDING#                   82 year old female with history of T2DM, anemia, bipolar disorder, delirium/psychosis, breast cancer s/p lumpectomy and RT, from [facility], transferred from [facility] for ophthalmology evaluation for concern of zoster ophthalmicus.     Afebrile  No leukocytosis   CT orbit on 12/20 ( at [facility]): Left preseptal cellulitis without evidence of orbital cellulitis  CT angio head and neck on 12/25: Mild subcutaneous edema within the left preseptal space, consistent with known history of orbital cellulitis.  Opthalmology consulted - Anterior exam significant for conjunctivitis and possible corneal involvement of Zoster, recommended MRI brain   MRI head with and without IV contrast on 12/25: Moderate left orbit preseptal soft tissue swelling, compatible with known   left preseptal cellulitis. No evidence of postseptal cellulitis.  MRSA PCR neg   Started on Valacyclovir on 12/22-->  Initially on Vanc, Flagyl and Ceftriaxone on 12/21-->12/24, switched to augmentin on 12/24    #Left Preseptal cellulitis with no evidence of postseptal cellulitis or brain involvement   #Herpes Zoster Ophthalmicus     Recommendations:   -Start Acyclovir 10 mg/kg q 8hrs   -Adequate hydration while on IV acyclovir  -Monitor kidney function and UO  -Discontinue po Valacyclovir   -Opthalmology following   -Can continue Augmentin for Preseptal cellulitis coverage   -Serial eye exam   -Monitor T curve and WBC trend     Discussed with Dr Manoj Pratt MD, PGY4  ID fellow  [employer] Teams Preferred  After 5pm/weekends call [phone]

## 2022-12-26 NOTE — CONSULT NOTE ADULT - ATTENDING COMMENTS
82-year-old female with a past medical history most significant for diabetes, breast cancer, bipolar disorder who is admitted to the hospital due to worsening left-sided facial redness and swelling.     Patient initially admitted to [facility] from 12/20/2022 to 12/23/2022.  ID consulted there for preseptal cellulitis, started on empiric antibiotics consisting of vancomycin and ceftriaxone metronidazole.  Patient then developed vesicular and pustular lesions with concern for herpes zoster ophthalmicus the patient was started on valacyclovir.  Patient was then transferred to [facility] for ophthalmology evaluation.     Upon arrival to [place] ER, patient afebrile, hemodynamically stable.  Latest labs show development of leukocytosis to 11.2, thrombocytosis 491.  CMP with renal and hepatic function within normal limits.  Blood cultures obtained from 12/20/2022 remain negative.  MRI brain obtained on 12/25/2022 shows no acute intracranial pathology, left orbit preseptal soft tissue swelling noted.  Ophthalmology evaluated on admission, holding off intra vitreal injection at this time.     Impression  #Abnormal imaging of the head   #Preseptal cellulitis   #Herpes zoster ophthalmicus    #Leukocytosis     Recommendations   Start IV acyclovir, IV fluids to avoid crystal induced nephropathy  Continue PO amox/clav for preseptal cellulitis  Will plan to deescalate to PO acyclovir in the next 24-48 hours provided clinical improvement  No further intervention per ophthalmology  Follow renal function daily  Follow fever curve and WBC count    Nicola Aranda MD  Division of Infectious Diseases

## 2022-12-26 NOTE — PROGRESS NOTE ADULT - SUBJECTIVE AND OBJECTIVE BOX
[facility] DEPARTMENT OF OPHTHALMOLOGY  ------------------------------------------------------------------------------  Reinaldo Chino MD  PGY2 Ophthalmology  ------------------------------------------------------------------------------    Interval History: Pt still altered today, but more cooperative. Pleasant. Uses repetitive words and sayings. Keeps calling out for her son. Able to follow general commands. Pleased that she is able to open her eye more today.    MEDICATIONS  (STANDING):  amoxicillin  875 milliGRAM(s)/clavulanate 1 Tablet(s) Oral two times a day  artificial tears (preservative free) Ophthalmic Solution 1 Drop(s) Both EYES every 2 hours  cinacalcet 30 milliGRAM(s) Oral daily  cyclopentolate 1% Solution 1 Drop(s) Left EYE three times a day  erythromycin   Ointment 1 Application(s) Left EYE three times a day  ferrous    sulfate Liquid 300 milliGRAM(s) Enteral Tube daily  lactobacillus acidophilus 1 Tablet(s) Oral daily  lithium 300 milliGRAM(s) Oral daily  ofloxacin 0.3% Solution 1 Drop(s) Left EYE four times a day  pantoprazole    Tablet 40 milliGRAM(s) Oral before breakfast  petrolatum Ophthalmic Ointment 1 Application(s) Both EYES at bedtime  prednisoLONE acetate 1% Suspension 1 Drop(s) Left EYE four times a day  senna 2 Tablet(s) Oral at bedtime  valACYclovir 1000 milliGRAM(s) Oral three times a day    MEDICATIONS  (PRN):  acetaminophen     Tablet .. 650 milliGRAM(s) Oral every 6 hours PRN Temp greater or equal to 38C (100.4F), Mild Pain (1 - 3)  diphenhydrAMINE 25 milliGRAM(s) Oral every 6 hours PRN Rash and/or Itching  magnesium hydroxide Suspension 30 milliLiter(s) Oral daily PRN Constipation  melatonin 3 milliGRAM(s) Oral at bedtime PRN Insomnia  polyethylene glycol 3350 17 Gram(s) Oral daily PRN Constipation  traMADol 50 milliGRAM(s) Oral four times a day PRN Severe Pain (7 - 10)      VITALS: T(C): 36.6 (12-25-22 @ 13:55)  T(F): 97.8 (12-25-22 @ 13:55), Max: 99 (12-24-22 @ 20:21)  HR: 73 (12-25-22 @ 13:55) (65 - 75)  BP: 144/91 (12-25-22 @ 13:55) (101/60 - 144/91)  RR:  (17 - 20)  SpO2:  (94% - 99%)  Wt(kg): --  2General: AAO x1. Improved mentation today, able to follow commands more.    Ophthalmology Exam:  Visual acuity (sc): Unreliable visual acuity assessment due to patient's altered mental status. 20/70 OD. 20/70 OS. then fails to follow directions  Pupils: Round and reactive OU. OD: 1mm light, 4 mm dark. OS: 4mm light, 4 mm dark.   Tonometry:  18 OD, 19 OS  Extraocular movements (EOMs): Grossly full, although patient is difficult to direct through exam.   Confrontational Visual Field (CVF): Unable to assess due to lack of patient cooperation  Color Plates: Unable to assess.     Slit Lamp Exam  External: Normal OD. V1 distribution vesicular rash, crusting over. Left facial droop. Able to open lid more   Lids/Lashes/Lacrimal Ducts: Flat OD. 2+ upper and lower eyelid erythema. Multiple eyelid margin regions.  Sclera/Conjunctiva: White and quiet OD. OS: 2+ injection. Discharge.   Cornea: Clear OD. OS: diffuse SPK OU. No stromal opacities. no pseudodendrites  Anterior Chamber: Deep and formed OD. OS: Hazy. hyphema has resolved with microhyphema now 4+ cell, mixture of pigmented and non-pigmented cells  Iris: Flat OU.  Lens: PCIOL OU.    Fundus Exam: dilated with 1% tropicamide and 2.5% phenylephrine  Approval obtained from primary team for dilation  Patient aware that pupils can remained dilated for at least 4-6 hours.  Exam performed with 20 D lens    Vitreous: wnl OD. OS: continued hazy exam. No clear view to posterior pole.    Disc, cup/disc: OD: sharp and pink, 0.5 OU. OS: continued hazy view posteriorly  Macula: Flat OD. No clear view OS.    Vessels: wnl OD. No clear view OS  Periphery: Flat OU. No clear view OS.     B-scan OS: Showing vitreous debris with some isolated cells. No RD. No masses. Similar finding OD. Likely syneresis vs old hemorrhage    Labs/Imaging:  < from: CT Orbit w/ IV Cont (12.20.22 @ 17:38) >  FINDINGS:  There is soft tissue stranding of the fat in the left preseptal space.   The globes are intact. No inflammatory stranding in the left retrobulbar   fat/intraconal space.    There is no loculated fluid collection.    Small retention cyst/polyp right maxillary sinus    IMPRESSION:  Left preseptal cellulitis without evidence of orbital cellulitis    < end of copied text >    < from: MR Orbits w/wo IV Cont (12.25.22 @ 18:19) >  No acute intracranialhemorrhage, acute infarction, extra-axial fluid   collection or hydrocephalus.  Moderate left orbit preseptal soft tissue swelling, compatible with known   left preseptal cellulitis. No evidence of postseptal cellulitis.    < end of copied text >    a< from: CT Angio Neck w/ IV Cont (12.25.22 @ 17:25) >    CT HEAD: Mild subcutaneous edema within the left preseptal space,   consistent with known history of orbital cellulitis. No acute   intra-cranial hemorrhage, mass effect, or midline shift. Consider MRI of   the brain as clinically warranted.    CTA BRAIN/NECK: Patent anterior and posterior circulation. No   flow-limiting stenosis or occlusion.    Large heterogeneous nodule/mass within the right thyroid lobe extending   into the mediastinum. Recommend thyroid ultrasound for further evaluation.    < end of copied text >     [facility] DEPARTMENT OF OPHTHALMOLOGY  ------------------------------------------------------------------------------  Reinaldo Chino MD  PGY2 Ophthalmology  ------------------------------------------------------------------------------    Interval History: Pt still altered today, but more cooperative. Pleasant. Uses repetitive words and sayings. Keeps calling out for her son. Able to follow general commands. Pleased that she is able to open her eye more today.    MEDICATIONS  (STANDING):  amoxicillin  875 milliGRAM(s)/clavulanate 1 Tablet(s) Oral two times a day  artificial tears (preservative free) Ophthalmic Solution 1 Drop(s) Both EYES every 2 hours  cinacalcet 30 milliGRAM(s) Oral daily  cyclopentolate 1% Solution 1 Drop(s) Left EYE three times a day  erythromycin   Ointment 1 Application(s) Left EYE three times a day  ferrous    sulfate Liquid 300 milliGRAM(s) Enteral Tube daily  lactobacillus acidophilus 1 Tablet(s) Oral daily  lithium 300 milliGRAM(s) Oral daily  ofloxacin 0.3% Solution 1 Drop(s) Left EYE four times a day  pantoprazole    Tablet 40 milliGRAM(s) Oral before breakfast  petrolatum Ophthalmic Ointment 1 Application(s) Both EYES at bedtime  prednisoLONE acetate 1% Suspension 1 Drop(s) Left EYE four times a day  senna 2 Tablet(s) Oral at bedtime  valACYclovir 1000 milliGRAM(s) Oral three times a day    MEDICATIONS  (PRN):  acetaminophen     Tablet .. 650 milliGRAM(s) Oral every 6 hours PRN Temp greater or equal to 38C (100.4F), Mild Pain (1 - 3)  diphenhydrAMINE 25 milliGRAM(s) Oral every 6 hours PRN Rash and/or Itching  magnesium hydroxide Suspension 30 milliLiter(s) Oral daily PRN Constipation  melatonin 3 milliGRAM(s) Oral at bedtime PRN Insomnia  polyethylene glycol 3350 17 Gram(s) Oral daily PRN Constipation  traMADol 50 milliGRAM(s) Oral four times a day PRN Severe Pain (7 - 10)      VITALS: T(C): 36.6 (12-25-22 @ 13:55)  T(F): 97.8 (12-25-22 @ 13:55), Max: 99 (12-24-22 @ 20:21)  HR: 73 (12-25-22 @ 13:55) (65 - 75)  BP: 144/91 (12-25-22 @ 13:55) (101/60 - 144/91)  RR:  (17 - 20)  SpO2:  (94% - 99%)  Wt(kg): --  2General: AAO x1. Improved mentation today, able to follow commands more.    Ophthalmology Exam:  Visual acuity (sc): Unreliable visual acuity assessment due to patient's altered mental status. 20/70 OD. 20/70 OS. then fails to follow directions  Pupils: Round and reactive OU. OD: 1mm light, 4 mm dark. OS: 4mm light, 4 mm dark.   Tonometry:  18 OD, 19 OS  Extraocular movements (EOMs): Grossly full, although patient is difficult to direct through exam.   Confrontational Visual Field (CVF): Unable to assess due to lack of patient cooperation  Color Plates: Unable to assess.     Slit Lamp Exam  External: Normal OD. V1 distribution vesicular rash, crusting over. Left facial droop. Able to open lid more   Lids/Lashes/Lacrimal Ducts: Flat OD. 2+ upper and lower eyelid erythema. Multiple eyelid margin regions.  Sclera/Conjunctiva: White and quiet OD. OS: 2+ injection. Discharge.   Cornea: Clear OD. OS: diffuse SPK OU. No stromal opacities. no pseudodendrites  Anterior Chamber: Deep and formed OD. OS: Hazy. hyphema has resolved with microhyphema now 4+ cell, mixture of pigmented and non-pigmented cells  Iris: Flat OU.  Lens: PCIOL OU.    Fundus Exam: dilated with 1% tropicamide and 2.5% phenylephrine  Approval obtained from primary team for dilation  Patient aware that pupils can remained dilated for at least 4-6 hours.  Exam performed with 20 D lens    Vitreous: wnl OD. OS: continued hazy exam. No clear view to posterior pole.    Disc, cup/disc: OD: sharp and pink, 0.5 OU. OS: continued hazy view posteriorly  Macula: Flat OD. No clear view OS.    Vessels: wnl OD. No clear view OS  Periphery: Flat OU. No clear view OS.     B-scan OS: Showing vitreous debris with some isolated cells. No RD. No masses. Similar finding OD. Likely syneresis vs old hemorrhage    Labs/Imaging:  < from: CT Orbit w/ IV Cont (12.20.22 @ 17:38) >  FINDINGS:  There is soft tissue stranding of the fat in the left preseptal space.   The globes are intact. No inflammatory stranding in the left retrobulbar   fat/intraconal space.    There is no loculated fluid collection.    Small retention cyst/polyp right maxillary sinus    IMPRESSION:  Left preseptal cellulitis without evidence of orbital cellulitis    < end of copied text >    < from: MR Orbits w/wo IV Cont (12.25.22 @ 18:19) >  No acute intracranialhemorrhage, acute infarction, extra-axial fluid   collection or hydrocephalus.  Moderate left orbit preseptal soft tissue swelling, compatible with known   left preseptal cellulitis. No evidence of postseptal cellulitis.    < end of copied text >    a< from: CT Angio Neck w/ IV Cont (12.25.22 @ 17:25) >    CT HEAD: Mild subcutaneous edema within the left preseptal space,   consistent with known history of orbital cellulitis. No acute   intra-cranial hemorrhage, mass effect, or midline shift. Consider MRI of   the brain as clinically warranted.    CTA BRAIN/NECK: Patent anterior and posterior circulation. No   flow-limiting stenosis or occlusion.    Large heterogeneous nodule/mass within the right thyroid lobe extending   into the mediastinum. Recommend thyroid ultrasound for further evaluation.    < end of copied text >     [facility] DEPARTMENT OF OPHTHALMOLOGY  ------------------------------------------------------------------------------  Reinaldo Chino MD  PGY2 Ophthalmology  ------------------------------------------------------------------------------    Interval History: Pt still altered today, but more cooperative. Pleasant. Uses repetitive words and sayings. Keeps calling out for her son. Able to follow general commands. Pleased that she is able to open her eye more today.    MEDICATIONS  (STANDING):  amoxicillin  875 milliGRAM(s)/clavulanate 1 Tablet(s) Oral two times a day  artificial tears (preservative free) Ophthalmic Solution 1 Drop(s) Both EYES every 2 hours  cinacalcet 30 milliGRAM(s) Oral daily  cyclopentolate 1% Solution 1 Drop(s) Left EYE three times a day  erythromycin   Ointment 1 Application(s) Left EYE three times a day  ferrous    sulfate Liquid 300 milliGRAM(s) Enteral Tube daily  lactobacillus acidophilus 1 Tablet(s) Oral daily  lithium 300 milliGRAM(s) Oral daily  ofloxacin 0.3% Solution 1 Drop(s) Left EYE four times a day  pantoprazole    Tablet 40 milliGRAM(s) Oral before breakfast  petrolatum Ophthalmic Ointment 1 Application(s) Both EYES at bedtime  prednisoLONE acetate 1% Suspension 1 Drop(s) Left EYE four times a day  senna 2 Tablet(s) Oral at bedtime  valACYclovir 1000 milliGRAM(s) Oral three times a day    MEDICATIONS  (PRN):  acetaminophen     Tablet .. 650 milliGRAM(s) Oral every 6 hours PRN Temp greater or equal to 38C (100.4F), Mild Pain (1 - 3)  diphenhydrAMINE 25 milliGRAM(s) Oral every 6 hours PRN Rash and/or Itching  magnesium hydroxide Suspension 30 milliLiter(s) Oral daily PRN Constipation  melatonin 3 milliGRAM(s) Oral at bedtime PRN Insomnia  polyethylene glycol 3350 17 Gram(s) Oral daily PRN Constipation  traMADol 50 milliGRAM(s) Oral four times a day PRN Severe Pain (7 - 10)      VITALS: T(C): 36.6 (12-25-22 @ 13:55)  T(F): 97.8 (12-25-22 @ 13:55), Max: 99 (12-24-22 @ 20:21)  HR: 73 (12-25-22 @ 13:55) (65 - 75)  BP: 144/91 (12-25-22 @ 13:55) (101/60 - 144/91)  RR:  (17 - 20)  SpO2:  (94% - 99%)  Wt(kg): --  2General: AAO x1. Improved mentation today, able to follow commands more.    Ophthalmology Exam:  Visual acuity (sc): Unreliable visual acuity assessment due to patient's altered mental status. 20/70 OD. 20/70 OS. then fails to follow directions  Pupils: Round and reactive OU. OD: 1mm light, 4 mm dark. OS: 4mm light, 4 mm dark.   Tonometry:  18 OD, 19 OS  Extraocular movements (EOMs): Grossly full, although patient is difficult to direct through exam.   Confrontational Visual Field (CVF): Unable to assess due to lack of patient cooperation  Color Plates: Unable to assess.     Slit Lamp Exam  External: Normal OD. V1 distribution vesicular rash, crusting over. Left facial droop. Able to open lid more   Lids/Lashes/Lacrimal Ducts: Flat OD. 2+ upper and lower eyelid erythema. Multiple eyelid margin regions.  Sclera/Conjunctiva: White and quiet OD. OS: 2+ injection. Discharge.   Cornea: Clear OD. OS: diffuse SPK OU. No stromal opacities. no pseudodendrites  Anterior Chamber: Deep and formed OD. OS: Hazy. hyphema has resolved with microhyphema now 4+ cell, mixture of pigmented and non-pigmented cells  Iris: Flat OU.  Lens: PCIOL OU.    Fundus Exam: dilated with 1% tropicamide and 2.5% phenylephrine  Approval obtained from primary team for dilation  Patient aware that pupils can remained dilated for at least 4-6 hours.  Exam performed with 20 D lens    Vitreous: wnl OD. OS: continued hazy exam. No clear view to posterior pole.    Disc, cup/disc: OD: sharp and pink, 0.5 OU. OS: continued hazy view posteriorly  Macula: Flat OD. No clear view OS.    Vessels: wnl OD. No clear view OS  Periphery: Flat OU. No clear view OS.     B-scan OS: Showing vitreous debris with some isolated cells. No RD. No masses. Similar finding OD. Likely syneresis vs old hemorrhage    Labs/Imaging:  < from: CT Orbit w/ IV Cont (12.20.22 @ 17:38) >  FINDINGS:  There is soft tissue stranding of the fat in the left preseptal space.   The globes are intact. No inflammatory stranding in the left retrobulbar   fat/intraconal space.    There is no loculated fluid collection.    Small retention cyst/polyp right maxillary sinus    IMPRESSION:  Left preseptal cellulitis without evidence of orbital cellulitis    < end of copied text >    < from: MR Orbits w/wo IV Cont (12.25.22 @ 18:19) >  No acute intracranialhemorrhage, acute infarction, extra-axial fluid   collection or hydrocephalus.  Moderate left orbit preseptal soft tissue swelling, compatible with known   left preseptal cellulitis. No evidence of postseptal cellulitis.    < end of copied text >    a< from: CT Angio Neck w/ IV Cont (12.25.22 @ 17:25) >    CT HEAD: Mild subcutaneous edema within the left preseptal space,   consistent with known history of orbital cellulitis. No acute   intra-cranial hemorrhage, mass effect, or midline shift. Consider MRI of   the brain as clinically warranted.    CTA BRAIN/NECK: Patent anterior and posterior circulation. No   flow-limiting stenosis or occlusion.    Large heterogeneous nodule/mass within the right thyroid lobe extending   into the mediastinum. Recommend thyroid ultrasound for further evaluation.    < end of copied text >

## 2022-12-26 NOTE — PROGRESS NOTE ADULT - ASSESSMENT
82-year-old female with history of T2DM (A1C 5.4 from Dec 22), anemia, bipolar disorder, delirium/psychosis, breast cancer s/p lumpectomy and RT, from [facility], who was brought in by EMS with left facial redness and swelling initially to [facility] on Dec 20, tx for herpes zoster and preseptal cellulitis (currently on valacyclovir and augmentin), now transferred to [facility] for ophthalmology evaluation

## 2022-12-26 NOTE — CONSULT NOTE ADULT - SUBJECTIVE AND OBJECTIVE BOX
HPI:    82 year old female with history of T2DM, anemia, bipolar disorder, delirium/psychosis, breast cancer s/p lumpectomy and RT, from [facility], transferred from [facility] for ophthalmology evaluation for concern of zoster ophthalmicus.     Patient was admitted to [facility] Dec 20 22 for concern for facial cellulitis, CT revealed left preseptal cellulitis without evidence of orbital cellulitis. ID was consulted, and patient was started on ceftriaxone/flagyl/and vancomycin empirically. There was also a concern for herpes zoster as patient developed new vesiulcar and pustular lesions, and patient was started on valacyclovir 1 g TID.  As per ID recs, patient can continue on valacyclovir and finish course of augmentin. Patient was transferred to [facility] for ophthalmology evaluation.     ID consulted for further recommendations.     REVIEW OF SYSTEMS  [  ] ROS unobtainable because:    [  ] All other systems negative except as noted below    Constitutional:  [ ] fever [ ] chills  [ ] weight loss  [ ]night sweat  [ ]poor appetite/PO intake [ ]fatigue   Skin:  [ ] rash [ ] phlebitis	  Eyes: [ ] icterus [ ] pain  [ ] discharge	  ENMT: [ ] sore throat  [ ] thrush [ ] ulcers [ ] exudates [ ]anosmia  Respiratory: [ ] dyspnea [ ] hemoptysis [ ] cough [ ] sputum	  Cardiovascular:  [ ] chest pain [ ] palpitations [ ] edema	  Gastrointestinal:  [ ] nausea [ ] vomiting [ ] diarrhea [ ] constipation [ ] pain	  Genitourinary:  [ ] dysuria [ ] frequency [ ] hematuria [ ] discharge [ ] flank pain  [ ] incontinence  Musculoskeletal:  [ ] myalgias [ ] arthralgias [ ] arthritis  [ ] back pain  Neurological:  [ ] headache [ ] weakness [ ] seizures  [ ] confusion/altered mental status    prior hospital charts reviewed [V]  primary team notes reviewed [V]  other consultant notes reviewed [V]    PAST MEDICAL & SURGICAL HISTORY:  Bipolar mood disorder    Esophageal reflux    Hypercalcemia    Hip pain, acute, right    Breast cancer, left  treated with surgery and RT, no lymph node dissection    S/P cataract extraction, unspecified laterality  bilateral - 2016    S/P lumpectomy, left breast    SOCIAL HISTORY:  - Denied smoking/vaping/alcohol/recreational drug use    FAMILY HISTORY:  No pertinent family history in first degree relatives    Allergies  No Known Allergies    ANTIMICROBIALS:  amoxicillin  875 milliGRAM(s)/clavulanate 1 two times a day  valACYclovir 1000 three times a day    ANTIMICROBIALS (past 90 days):  MEDICATIONS  (STANDING):    amoxicillin  875 milliGRAM(s)/clavulanate   1 Tablet(s) Oral (12-26-22 @ 05:24)   1 Tablet(s) Oral (12-25-22 @ 18:47)   1 Tablet(s) Oral (12-25-22 @ 06:55)    valACYclovir   1000 milliGRAM(s) Oral (12-26-22 @ 05:23)   1000 milliGRAM(s) Oral (12-25-22 @ 21:19)   1000 milliGRAM(s) Oral (12-25-22 @ 12:02)   1000 milliGRAM(s) Oral (12-25-22 @ 03:24)    OTHER MEDS:   MEDICATIONS  (STANDING):  acetaminophen     Tablet .. 650 every 6 hours PRN  cinacalcet 30 daily  diphenhydrAMINE 25 every 6 hours PRN  lithium 300 daily  magnesium hydroxide Suspension 30 daily PRN  melatonin 3 at bedtime PRN  pantoprazole    Tablet 40 before breakfast  polyethylene glycol 3350 17 daily PRN  senna 2 at bedtime  traMADol 50 four times a day PRN    VITALS:  Vital Signs Last 24 Hrs  T(F): 98.2 (12-26-22 @ 05:02), Max: 99.9 (12-21-22 @ 19:51)    Vital Signs Last 24 Hrs  HR: 66 (12-26-22 @ 05:02) (65 - 90)  BP: 129/71 (12-26-22 @ 05:02) (129/71 - 144/91)  RR: 17 (12-26-22 @ 05:02)  SpO2: 99% (12-26-22 @ 05:02) (94% - 99%)  Wt(kg): --    EXAM:  Physical Exam:  Constitutional:  well preserved, comfortable  Head/Eyes: no icterus, PERRL, EOMI  ENT:  supple; no thrush  LUNGS:  CTA  CVS:  normal S1, S2, no murmur  Abd:  soft, non-tender; non-distended  Ext:  no edema  Vascular:  IV site no erythema tenderness or discharge  MSK:  joints without swelling  Neuro: AAO X 3, non- focal    Labs:                        10.0   x     )-----------( x        ( 25 Dec 2022 12:22 )             31.9     12-25    135  |  104  |  15  ----------------------------<  94  4.2   |  21<L>  |  0.55    Ca    9.5      25 Dec 2022 05:18  Phos  2.4     12-25  Mg     2.1     12-25    TPro  6.3  /  Alb  2.8<L>  /  TBili  0.2  /  DBili  x   /  AST  28  /  ALT  22  /  AlkPhos  62  12-25    WBC Trend:  WBC Count: 8.24 (12-25-22 @ 05:18)  WBC Count: 8.49 (12-24-22 @ 07:07)  WBC Count: 7.42 (12-23-22 @ 06:35)  WBC Count: 7.95 (12-22-22 @ 06:05)    Auto Neutrophil #: 5.57 K/uL (12-25-22 @ 05:18)  Auto Neutrophil #: 5.70 K/uL (12-22-22 @ 06:05)  Auto Neutrophil #: 5.27 K/uL (12-20-22 @ 16:10)    Creatine Trend:  Creatinine, Serum: 0.55 (12-25)  Creatinine, Serum: <0.30 (12-25)  Creatinine, Serum: 0.61 (12-24)  Creatinine, Serum: 0.60 (12-23)    Liver Biochemical Testing Trend:  Alanine Aminotransferase (ALT/SGPT): 22 (12-25)  Alanine Aminotransferase (ALT/SGPT): 12 (12-25)  Alanine Aminotransferase (ALT/SGPT): 25 (12-22)  Alanine Aminotransferase (ALT/SGPT): 29 (12-20)  Aspartate Aminotransferase (AST/SGOT): 28 (12-25-22 @ 05:18)  Aspartate Aminotransferase (AST/SGOT): 24 (12-25-22 @ 02:45)  Aspartate Aminotransferase (AST/SGOT): 28 (12-22-22 @ 06:05)  Aspartate Aminotransferase (AST/SGOT): 28 (12-20-22 @ 16:10)  Bilirubin Total, Serum: 0.2 (12-25)  Bilirubin Total, Serum: <0.1 (12-25)  Bilirubin Total, Serum: 0.3 (12-22)  Bilirubin Total, Serum: 0.2 (12-20)    Trend LDH    Auto Eosinophil %: 1.6 % (12-25-22 @ 05:18)    MICROBIOLOGY:  Vancomycin Level, Trough: 5.2 (12-23 @ 16:00)    MRSA PCR Result.: NotDetec (12-22-22 @ 05:00)      Culture - Urine (collected 21 Dec 2022 11:10)  Source: Clean Catch Clean Catch (Midstream)  Final Report:    <10,000 CFU/mL Normal Urogenital Joanie    Culture - Blood (collected 20 Dec 2022 16:10)  Source: .Blood Blood-Peripheral  Final Report:    No Growth Final    Culture - Blood (collected 20 Dec 2022 16:05)  Source: .Blood Blood-Peripheral  Final Report:    No Growth Final    COVID-19 PCR: NotDetec (12-24-22 @ 15:00)    Blood Gas Venous - Lactate: 1.5 (12-25 @ 05:00)    A1C with Estimated Average Glucose Result: 5.4 % (12-22-22 @ 06:05)    RADIOLOGY:  imaging below personally reviewed      < from: CT Orbit w/ IV Cont (12.20.22 @ 17:38) >  IMPRESSION:  Left preseptal cellulitis without evidence of orbital cellulitis    --- End of Report ---    < end of copied text >  < from: CT Angio Head w/ IV Cont (12.25.22 @ 17:24) >  IMPRESSION:    CT HEAD: Mild subcutaneous edema within the left preseptal space,   consistent with known history of orbital cellulitis. No acute   intra-cranial hemorrhage, mass effect, or midline shift. Consider MRI of   the brain as clinically warranted.    CTA BRAIN/NECK: Patent anterior and posterior circulation. No   flow-limiting stenosis or occlusion.    Large heterogeneous nodule/mass within the right thyroid lobe extending   into the mediastinum. Recommend thyroid ultrasound for further evaluation.    --- End of Report ---    < end of copied text >  < from: MR Head w/wo IV Cont (12.25.22 @ 18:19) >  IMPRESSION:  No acute intracranialhemorrhage, acute infarction, extra-axial fluid   collection or hydrocephalus.  Moderate left orbit preseptal soft tissue swelling, compatible with known   left preseptal cellulitis. No evidence of postseptal cellulitis.    --- End of Report ---    < end of copied text >         HPI:    82 year old female with history of T2DM, anemia, bipolar disorder, delirium/psychosis, breast cancer s/p lumpectomy and RT, from [facility], transferred from [facility] for ophthalmology evaluation for concern of zoster ophthalmicus.     Patient was admitted to [facility] Dec 20 22 for concern for facial cellulitis, CT revealed left preseptal cellulitis without evidence of orbital cellulitis. ID was consulted, and patient was started on ceftriaxone/flagyl/and vancomycin empirically. There was also a concern for herpes zoster as patient developed new vesiulcar and pustular lesions, and patient was started on valacyclovir 1 g TID.  As per ID recs, patient can continue on valacyclovir and finish course of augmentin. Patient was transferred to [facility] for ophthalmology evaluation.     ID consulted for further recommendations.     REVIEW OF SYSTEMS  [  ] ROS unobtainable because:    [X] All other systems negative except as noted below    Constitutional:  [ ] fever [ ] chills  [ ] weight loss  [ ]night sweat  [ ]poor appetite/PO intake [ ]fatigue   Skin:  [ ] rash [ ] phlebitis	  Eyes: [ ] icterus [X] L eye pain and erythema  [ ] discharge	  ENMT: [ ] sore throat  [ ] thrush [ ] ulcers [ ] exudates [ ]anosmia  Respiratory: [ ] dyspnea [ ] hemoptysis [ ] cough [ ] sputum	  Cardiovascular:  [ ] chest pain [ ] palpitations [ ] edema	  Gastrointestinal:  [ ] nausea [ ] vomiting [ ] diarrhea [ ] constipation [ ] pain	  Genitourinary:  [ ] dysuria [ ] frequency [ ] hematuria [ ] discharge [ ] flank pain  [ ] incontinence  Musculoskeletal:  [ ] myalgias [ ] arthralgias [ ] arthritis  [ ] back pain  Neurological:  [ ] headache [ ] weakness [ ] seizures  [ ] confusion/altered mental status    prior hospital charts reviewed [V]  primary team notes reviewed [V]  other consultant notes reviewed [V]    PAST MEDICAL & SURGICAL HISTORY:  Bipolar mood disorder    Esophageal reflux    Hypercalcemia    Hip pain, acute, right    Breast cancer, left  treated with surgery and RT, no lymph node dissection    S/P cataract extraction, unspecified laterality  bilateral - 2016    S/P lumpectomy, left breast    SOCIAL HISTORY:  - Denied smoking/vaping/alcohol/recreational drug use    FAMILY HISTORY:  No pertinent family history in first degree relatives    Allergies  No Known Allergies    ANTIMICROBIALS:  amoxicillin  875 milliGRAM(s)/clavulanate 1 two times a day  valACYclovir 1000 three times a day    ANTIMICROBIALS (past 90 days):  MEDICATIONS  (STANDING):    amoxicillin  875 milliGRAM(s)/clavulanate   1 Tablet(s) Oral (12-26-22 @ 05:24)   1 Tablet(s) Oral (12-25-22 @ 18:47)   1 Tablet(s) Oral (12-25-22 @ 06:55)    valACYclovir   1000 milliGRAM(s) Oral (12-26-22 @ 05:23)   1000 milliGRAM(s) Oral (12-25-22 @ 21:19)   1000 milliGRAM(s) Oral (12-25-22 @ 12:02)   1000 milliGRAM(s) Oral (12-25-22 @ 03:24)    OTHER MEDS:   MEDICATIONS  (STANDING):  acetaminophen     Tablet .. 650 every 6 hours PRN  cinacalcet 30 daily  diphenhydrAMINE 25 every 6 hours PRN  lithium 300 daily  magnesium hydroxide Suspension 30 daily PRN  melatonin 3 at bedtime PRN  pantoprazole    Tablet 40 before breakfast  polyethylene glycol 3350 17 daily PRN  senna 2 at bedtime  traMADol 50 four times a day PRN    VITALS:  Vital Signs Last 24 Hrs  T(F): 98.2 (12-26-22 @ 05:02), Max: 99.9 (12-21-22 @ 19:51)    Vital Signs Last 24 Hrs  HR: 66 (12-26-22 @ 05:02) (65 - 90)  BP: 129/71 (12-26-22 @ 05:02) (129/71 - 144/91)  RR: 17 (12-26-22 @ 05:02)  SpO2: 99% (12-26-22 @ 05:02) (94% - 99%)  Wt(kg): --    EXAM:  Physical Exam:  Constitutional:  well preserved, comfortable  Head/Eyes: L eye surrounding erythema and vesicles, left eye conjunctivitis    ENT:  supple; no thrush  LUNGS:  CTA  CVS:  normal S1, S2, no murmur  Abd:  soft, non-tender; non-distended  Ext:  no edema  Vascular:  IV site no erythema tenderness or discharge  MSK:  joints without swelling  Neuro: AAO X 3, non- focal    Labs:                        10.0   x     )-----------( x        ( 25 Dec 2022 12:22 )             31.9     12-25    135  |  104  |  15  ----------------------------<  94  4.2   |  21<L>  |  0.55    Ca    9.5      25 Dec 2022 05:18  Phos  2.4     12-25  Mg     2.1     12-25    TPro  6.3  /  Alb  2.8<L>  /  TBili  0.2  /  DBili  x   /  AST  28  /  ALT  22  /  AlkPhos  62  12-25    WBC Trend:  WBC Count: 8.24 (12-25-22 @ 05:18)  WBC Count: 8.49 (12-24-22 @ 07:07)  WBC Count: 7.42 (12-23-22 @ 06:35)  WBC Count: 7.95 (12-22-22 @ 06:05)    Auto Neutrophil #: 5.57 K/uL (12-25-22 @ 05:18)  Auto Neutrophil #: 5.70 K/uL (12-22-22 @ 06:05)  Auto Neutrophil #: 5.27 K/uL (12-20-22 @ 16:10)    Creatine Trend:  Creatinine, Serum: 0.55 (12-25)  Creatinine, Serum: <0.30 (12-25)  Creatinine, Serum: 0.61 (12-24)  Creatinine, Serum: 0.60 (12-23)    Liver Biochemical Testing Trend:  Alanine Aminotransferase (ALT/SGPT): 22 (12-25)  Alanine Aminotransferase (ALT/SGPT): 12 (12-25)  Alanine Aminotransferase (ALT/SGPT): 25 (12-22)  Alanine Aminotransferase (ALT/SGPT): 29 (12-20)  Aspartate Aminotransferase (AST/SGOT): 28 (12-25-22 @ 05:18)  Aspartate Aminotransferase (AST/SGOT): 24 (12-25-22 @ 02:45)  Aspartate Aminotransferase (AST/SGOT): 28 (12-22-22 @ 06:05)  Aspartate Aminotransferase (AST/SGOT): 28 (12-20-22 @ 16:10)  Bilirubin Total, Serum: 0.2 (12-25)  Bilirubin Total, Serum: <0.1 (12-25)  Bilirubin Total, Serum: 0.3 (12-22)  Bilirubin Total, Serum: 0.2 (12-20)    Trend LDH    Auto Eosinophil %: 1.6 % (12-25-22 @ 05:18)    MICROBIOLOGY:  Vancomycin Level, Trough: 5.2 (12-23 @ 16:00)    MRSA PCR Result.: NotDetec (12-22-22 @ 05:00)      Culture - Urine (collected 21 Dec 2022 11:10)  Source: Clean Catch Clean Catch (Midstream)  Final Report:    <10,000 CFU/mL Normal Urogenital Joanie    Culture - Blood (collected 20 Dec 2022 16:10)  Source: .Blood Blood-Peripheral  Final Report:    No Growth Final    Culture - Blood (collected 20 Dec 2022 16:05)  Source: .Blood Blood-Peripheral  Final Report:    No Growth Final    COVID-19 PCR: NotDetec (12-24-22 @ 15:00)    Blood Gas Venous - Lactate: 1.5 (12-25 @ 05:00)    A1C with Estimated Average Glucose Result: 5.4 % (12-22-22 @ 06:05)    RADIOLOGY:  imaging below personally reviewed      < from: CT Orbit w/ IV Cont (12.20.22 @ 17:38) >  IMPRESSION:  Left preseptal cellulitis without evidence of orbital cellulitis    --- End of Report ---    < end of copied text >  < from: CT Angio Head w/ IV Cont (12.25.22 @ 17:24) >  IMPRESSION:    CT HEAD: Mild subcutaneous edema within the left preseptal space,   consistent with known history of orbital cellulitis. No acute   intra-cranial hemorrhage, mass effect, or midline shift. Consider MRI of   the brain as clinically warranted.    CTA BRAIN/NECK: Patent anterior and posterior circulation. No   flow-limiting stenosis or occlusion.    Large heterogeneous nodule/mass within the right thyroid lobe extending   into the mediastinum. Recommend thyroid ultrasound for further evaluation.    --- End of Report ---    < end of copied text >  < from: MR Head w/wo IV Cont (12.25.22 @ 18:19) >  IMPRESSION:  No acute intracranialhemorrhage, acute infarction, extra-axial fluid   collection or hydrocephalus.  Moderate left orbit preseptal soft tissue swelling, compatible with known   left preseptal cellulitis. No evidence of postseptal cellulitis.    --- End of Report ---    < end of copied text >

## 2022-12-26 NOTE — PROGRESS NOTE ADULT - ASSESSMENT
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 Pt was being treated at outside hospital with Valtrex 1g PO TID, as well as systemic antibiotics for superimposed pre-septal cellulitis.       #Herpes Zoster Ophthalmicus OS  - Patient seems to be more altered today. Visual Acuity unable to be accurately assessed on today's examination.  - IOP within normal limits.   - Pupils round and reactive OU. OD: 2mm light, 4 mm dark. OS: 4mm light, 4 mm dark.   - CTA shows preseptal cellulitis with edema of the left preseptal space, CTA negative, right thyroid lobe extending into mediastinum with recommend ultrasound (possible 2nd order Horners?)  - MRI negative  - EOM grossly full  - Anterior exam significant for conjunctivitis.   - DFE limited due to increase in vitreous   - Etiology of vitreous haze unclear at this stage. May be secondary to vitritis, however Zoster rarely presents as vitritis. Vitreous hemorrhage is still a consideration given presence of layered hyphema in the anterior chamber. B-scan confirms vitreous debri.   - continue started pred forte QID and cyclogyl TID in setting of hyphema. (order placed by opthalmology)  - Given worsening in patient's mental status and increase in vitreous haze, would consider broadening antibiotic coverage or switching to IV to cover for any alternate sources of infection. Would also recommend further sepsis work-up. Consider [place] infectious disease consult. Spoke with previous infectious disease attending, she does not come to [facility].   - Continue IV antivirals per primary  team  - Continue Erythromycin ointment to eyelids AND eye TID OS (order placed by opthalmology)  - continue Ofloxacin QID OS (order placed by ophthalmology)  - continue artificial tears q2 hours OU. Lacrilube ointment at night. (order placed by ophthalmology)  - Ophthalmology to follow closely.       #Preseptal Cellulitis OS  - Per outside hospital notes patient was receiving PO systemic antibiotics for preseptal cellulitis and was improving.   - Given unclear etiology of worsening of patient's ophthalmic exam, would consider broadening abx coverage and switching to IV.       SDW Dr. Rola BULL PGY-4    Outpatient Follow-up: Patient should follow-up with his/her ophthalmologist or with [facility] Department of Ophthalmology within 1 week of after discharge at:    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 Pt was being treated at outside hospital with Valtrex 1g PO TID, as well as systemic antibiotics for superimposed pre-septal cellulitis.       #Herpes Zoster Ophthalmicus OS  - Patient seems to be more altered today. Visual Acuity unable to be accurately assessed on today's examination.  - IOP within normal limits.   - Pupils round and reactive OU. OD: 2mm light, 4 mm dark. OS: 4mm light, 4 mm dark.   - CTA shows preseptal cellulitis with edema of the left preseptal space, CTA negative, right thyroid lobe extending into mediastinum with recommend ultrasound (possible 2nd order Horners?)  - MRI negative  - EOM grossly full  - Anterior exam significant for conjunctivitis.   - DFE limited due to increase in vitreous   - Etiology of vitreous haze unclear at this stage. May be secondary to vitritis, however Zoster rarely presents as vitritis. Vitreous hemorrhage is still a consideration given presence of layered hyphema in the anterior chamber. B-scan confirms vitreous debri.   - continue started pred forte QID and cyclogyl TID in setting of hyphema. (order placed by opthalmology)  - Given worsening in patient's mental status and increase in vitreous haze, would consider broadening antibiotic coverage or switching to IV to cover for any alternate sources of infection. Would also recommend further sepsis work-up. Consider [place] infectious disease consult. Spoke with previous infectious disease attending, she does not come to [facility].   - Continue IV antivirals per primary  team  - Continue Erythromycin ointment to eyelids AND eye TID OS (order placed by opthalmology)  - continue Ofloxacin QID OS (order placed by ophthalmology)  - continue artificial tears q2 hours OU. Lacrilube ointment at night. (order placed by ophthalmology)  - Ophthalmology to follow closely.       #Preseptal Cellulitis OS  - Per outside hospital notes patient was receiving PO systemic antibiotics for preseptal cellulitis and was improving.   - Given unclear etiology of worsening of patient's ophthalmic exam, would consider broadening abx coverage and switching to IV.       SDW Dr. Rola BULL PGY-4    Outpatient Follow-up: Patient should follow-up with his/her ophthalmologist or with [facility] Department of Ophthalmology within 1 week of after discharge at:    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 Pt was being treated at outside hospital with Valtrex 1g PO TID, as well as systemic antibiotics for superimposed pre-septal cellulitis.       #Herpes Zoster Ophthalmicus OS  - Patient seems to be more altered today. Visual Acuity unable to be accurately assessed on today's examination.  - IOP within normal limits.   - Pupils round and reactive OU. OD: 2mm light, 4 mm dark. OS: 4mm light, 4 mm dark.   - CTA shows preseptal cellulitis with edema of the left preseptal space, CTA negative, right thyroid lobe extending into mediastinum with recommend ultrasound (possible 2nd order Horners?)  - MRI negative  - EOM grossly full  - Anterior exam significant for conjunctivitis.   - DFE limited due to increase in vitreous   - Etiology of vitreous haze unclear at this stage. May be secondary to vitritis, however Zoster rarely presents as vitritis. Vitreous hemorrhage is still a consideration given presence of layered hyphema in the anterior chamber. B-scan confirms vitreous debri.   - continue started pred forte QID and cyclogyl TID in setting of hyphema. (order placed by opthalmology)  - Given worsening in patient's mental status and increase in vitreous haze, would consider broadening antibiotic coverage or switching to IV to cover for any alternate sources of infection. Would also recommend further sepsis work-up. Consider [place] infectious disease consult. Spoke with previous infectious disease attending, she does not come to [facility].   - Continue IV antivirals per primary  team  - Continue Erythromycin ointment to eyelids AND eye TID OS (order placed by opthalmology)  - continue Ofloxacin QID OS (order placed by ophthalmology)  - continue artificial tears q2 hours OU. Lacrilube ointment at night. (order placed by ophthalmology)  - Ophthalmology to follow closely.       #Preseptal Cellulitis OS  - Per outside hospital notes patient was receiving PO systemic antibiotics for preseptal cellulitis and was improving.   - Given unclear etiology of worsening of patient's ophthalmic exam, would consider broadening abx coverage and switching to IV.       SDW Dr. Rola BULL PGY-4    Outpatient Follow-up: Patient should follow-up with his/her ophthalmologist or with [facility] Department of Ophthalmology within 1 week of after discharge at:    [address]  [place], [place] [id number] [phone]

## 2022-12-26 NOTE — PROGRESS NOTE ADULT - PROBLEM SELECTOR PLAN 1
Optho eval appreciated, B-scan showing vitreous debris concerning for possible vitritis, may need intravitreal tx with antiviral     -Continue erythromycin ointment to eyelids AND eye TID OS   -Continue artificial tears q2 hours OU. Lacrilube ointment at night.  -F/u MRI Brain w orbits W IV contrast Moderate left orbit preseptal soft tissue swelling, compatible with known left preseptal cellulitis. No evidence of postseptal cellulitis.  -Tylenol PRN for pain    -Start Acyclovir 10 mg/kg q 8hrs   -Adequate hydration while on IV acyclovir

## 2022-12-26 NOTE — PROGRESS NOTE ADULT - SUBJECTIVE AND OBJECTIVE BOX
Patient is a 82y old  Female who presents with a chief complaint of Opthalmology eval (26 Dec 2022 09:10)      SUBJECTIVE / OVERNIGHT EVENTS: no events     MEDICATIONS  (STANDING):  amoxicillin  875 milliGRAM(s)/clavulanate 1 Tablet(s) Oral two times a day  artificial tears (preservative free) Ophthalmic Solution 1 Drop(s) Both EYES every 2 hours  cinacalcet 30 milliGRAM(s) Oral daily  cyclopentolate 1% Solution 1 Drop(s) Left EYE three times a day  erythromycin   Ointment 1 Application(s) Left EYE three times a day  ferrous    sulfate Liquid 300 milliGRAM(s) Enteral Tube daily  lactobacillus acidophilus 1 Tablet(s) Oral daily  lithium 300 milliGRAM(s) Oral daily  ofloxacin 0.3% Solution 1 Drop(s) Left EYE four times a day  pantoprazole    Tablet 40 milliGRAM(s) Oral before breakfast  petrolatum Ophthalmic Ointment 1 Application(s) Both EYES at bedtime  prednisoLONE acetate 1% Suspension 1 Drop(s) Left EYE four times a day  senna 2 Tablet(s) Oral at bedtime  valACYclovir 1000 milliGRAM(s) Oral three times a day    MEDICATIONS  (PRN):  acetaminophen     Tablet .. 650 milliGRAM(s) Oral every 6 hours PRN Temp greater or equal to 38C (100.4F), Mild Pain (1 - 3)  diphenhydrAMINE 25 milliGRAM(s) Oral every 6 hours PRN Rash and/or Itching  magnesium hydroxide Suspension 30 milliLiter(s) Oral daily PRN Constipation  melatonin 3 milliGRAM(s) Oral at bedtime PRN Insomnia  polyethylene glycol 3350 17 Gram(s) Oral daily PRN Constipation  traMADol 50 milliGRAM(s) Oral four times a day PRN Severe Pain (7 - 10)      CAPILLARY BLOOD GLUCOSE        I&O's Summary    25 Dec 2022 07:01  -  26 Dec 2022 07:00  --------------------------------------------------------  IN: 360 mL / OUT: 0 mL / NET: 360 mL    26 Dec 2022 07:01  -  26 Dec 2022 13:35  --------------------------------------------------------  IN: 480 mL / OUT: 0 mL / NET: 480 mL      T(C): 36.9 (12-26-22 @ 13:15), Max: 36.9 (12-26-22 @ 13:15)  HR: 74 (12-26-22 @ 13:15) (66 - 74)  BP: 103/63 (12-26-22 @ 13:15) (103/63 - 129/71)  RR: 16 (12-26-22 @ 13:15) (16 - 17)  SpO2: 100% (12-26-22 @ 13:15) (99% - 100%)    PHYSICAL EXAM:  GENERAL: NAD, well-developed  HEAD:  Atraumatic, Normocephalic  Left Eye Swollen, erythematous    EYES: EOMI, Left Eye Swollen, erythematous    NECK: Supple, No JVD  CHEST/LUNG: Clear to auscultation bilaterally; No wheeze  HEART: Regular rate and rhythm; No murmurs, rubs, or gallops  ABDOMEN: Soft, Nontender, Nondistended; Bowel sounds present  EXTREMITIES:  2+ Peripheral Pulses, No clubbing, cyanosis, or edema  PSYCH: AAOx3  NEUROLOGY: non-focal  SKIN: No rashes or lesions                          10.4   11.23 )-----------( 491      ( 26 Dec 2022 09:17 )             34.3           LIVER FUNCTIONS - ( 25 Dec 2022 05:18 )  Alb: 2.8 g/dL / Pro: 6.3 g/dL / ALK PHOS: 62 U/L / ALT: 22 U/L / AST: 28 U/L / GGT: x             135|103|14<99  4.3|21|0.58  10.9,--,--  12-26 @ 09:17      RADIOLOGY & ADDITIONAL TESTS:    Imaging Personally Reviewed:    Consultant(s) Notes Reviewed:      Care Discussed with Consultants/Other Providers:

## 2022-12-27 LAB
ANION GAP SERPL CALC-SCNC: 9 MMOL/L — SIGNIFICANT CHANGE UP (ref 5–17)
BUN SERPL-MCNC: 17 MG/DL — SIGNIFICANT CHANGE UP (ref 7–23)
CALCIUM SERPL-MCNC: 10.4 MG/DL — SIGNIFICANT CHANGE UP (ref 8.4–10.5)
CHLORIDE SERPL-SCNC: 105 MMOL/L — SIGNIFICANT CHANGE UP (ref 96–108)
CO2 SERPL-SCNC: 21 MMOL/L — LOW (ref 22–31)
CREAT SERPL-MCNC: 0.62 MG/DL — SIGNIFICANT CHANGE UP (ref 0.5–1.3)
EGFR: 89 ML/MIN/1.73M2 — SIGNIFICANT CHANGE UP
GLUCOSE SERPL-MCNC: 105 MG/DL — HIGH (ref 70–99)
HCT VFR BLD CALC: 31.9 % — LOW (ref 34.5–45)
HGB BLD-MCNC: 9.7 G/DL — LOW (ref 11.5–15.5)
MCHC RBC-ENTMCNC: 28.7 PG — SIGNIFICANT CHANGE UP (ref 27–34)
MCHC RBC-ENTMCNC: 30.4 GM/DL — LOW (ref 32–36)
MCV RBC AUTO: 94.4 FL — SIGNIFICANT CHANGE UP (ref 80–100)
NRBC # BLD: 0 /100 WBCS — SIGNIFICANT CHANGE UP (ref 0–0)
PLATELET # BLD AUTO: 510 K/UL — HIGH (ref 150–400)
POTASSIUM SERPL-MCNC: 3.9 MMOL/L — SIGNIFICANT CHANGE UP (ref 3.5–5.3)
POTASSIUM SERPL-SCNC: 3.9 MMOL/L — SIGNIFICANT CHANGE UP (ref 3.5–5.3)
RBC # BLD: 3.38 M/UL — LOW (ref 3.8–5.2)
RBC # FLD: 15.7 % — HIGH (ref 10.3–14.5)
SODIUM SERPL-SCNC: 135 MMOL/L — SIGNIFICANT CHANGE UP (ref 135–145)
WBC # BLD: 9.65 K/UL — SIGNIFICANT CHANGE UP (ref 3.8–10.5)
WBC # FLD AUTO: 9.65 K/UL — SIGNIFICANT CHANGE UP (ref 3.8–10.5)

## 2022-12-27 PROCEDURE — 99232 SBSQ HOSP IP/OBS MODERATE 35: CPT

## 2022-12-27 RX ADMIN — Medication 1 DROP(S): at 23:41

## 2022-12-27 RX ADMIN — CINACALCET 30 MILLIGRAM(S): 30 TABLET, FILM COATED ORAL at 11:53

## 2022-12-27 RX ADMIN — Medication 1 TABLET(S): at 11:53

## 2022-12-27 RX ADMIN — Medication 1 DROP(S): at 06:52

## 2022-12-27 RX ADMIN — Medication 1 APPLICATION(S): at 21:30

## 2022-12-27 RX ADMIN — PANTOPRAZOLE SODIUM 40 MILLIGRAM(S): 20 TABLET, DELAYED RELEASE ORAL at 06:06

## 2022-12-27 RX ADMIN — Medication 1 APPLICATION(S): at 14:23

## 2022-12-27 RX ADMIN — Medication 1 DROP(S): at 11:53

## 2022-12-27 RX ADMIN — SENNA PLUS 2 TABLET(S): 8.6 TABLET ORAL at 21:30

## 2022-12-27 RX ADMIN — Medication 1 TABLET(S): at 06:04

## 2022-12-27 RX ADMIN — Medication 1 DROP(S): at 14:23

## 2022-12-27 RX ADMIN — Medication 1 DROP(S): at 11:52

## 2022-12-27 RX ADMIN — CYCLOPENTOLATE HYDROCHLORIDE 1 DROP(S): 10 SOLUTION/ DROPS OPHTHALMIC at 14:23

## 2022-12-27 RX ADMIN — Medication 1 DROP(S): at 17:10

## 2022-12-27 RX ADMIN — Medication 1 APPLICATION(S): at 06:05

## 2022-12-27 RX ADMIN — Medication 165 MILLIGRAM(S): at 06:04

## 2022-12-27 RX ADMIN — Medication 1 DROP(S): at 04:58

## 2022-12-27 RX ADMIN — Medication 1 DROP(S): at 17:11

## 2022-12-27 RX ADMIN — Medication 1 DROP(S): at 21:31

## 2022-12-27 RX ADMIN — Medication 1 DROP(S): at 10:09

## 2022-12-27 RX ADMIN — LITHIUM CARBONATE 300 MILLIGRAM(S): 300 TABLET, EXTENDED RELEASE ORAL at 11:53

## 2022-12-27 RX ADMIN — CYCLOPENTOLATE HYDROCHLORIDE 1 DROP(S): 10 SOLUTION/ DROPS OPHTHALMIC at 06:05

## 2022-12-27 RX ADMIN — CYCLOPENTOLATE HYDROCHLORIDE 1 DROP(S): 10 SOLUTION/ DROPS OPHTHALMIC at 21:30

## 2022-12-27 RX ADMIN — Medication 1 DROP(S): at 20:52

## 2022-12-27 RX ADMIN — Medication 165 MILLIGRAM(S): at 14:24

## 2022-12-27 RX ADMIN — Medication 1 APPLICATION(S): at 22:50

## 2022-12-27 RX ADMIN — Medication 1 DROP(S): at 06:05

## 2022-12-27 RX ADMIN — Medication 165 MILLIGRAM(S): at 22:29

## 2022-12-27 RX ADMIN — Medication 300 MILLIGRAM(S): at 11:53

## 2022-12-27 RX ADMIN — Medication 1 TABLET(S): at 17:11

## 2022-12-27 RX ADMIN — SODIUM CHLORIDE 60 MILLILITER(S): 9 INJECTION INTRAMUSCULAR; INTRAVENOUS; SUBCUTANEOUS at 21:27

## 2022-12-27 NOTE — PROGRESS NOTE ADULT - ASSESSMENT
82y female with a past medical history of diabetes, anemia, bipolar disorder, delirium/psychosis, ocular history of PCIOL OU, transferred for ophthalmology evaluation of Herpes Zoster Ophthalmicus OS. Pt was being treated at outside hospital with Valtrex 1g PO TID, as well as systemic antibiotics for superimposed pre-septal cellulitis.     #Herpes Zoster V1 OS  - CTA shows preseptal cellulitis with edema of the left preseptal space, CTA negative, right thyroid lobe extending into mediastinum with recommend ultrasound (possible 2nd order Horners?)  - MRI negative  - Pt with anterior chamber heme and inflammation, improved today. Will keep current mgmt  - continue started pred forte QID and cyclogyl TID . (order placed by opthalmology)  - Continue IV antivirals per primary  team  - Continue Erythromycin ointment to eyelids AND eye TID OS (order placed by opthalmology)  - continue Ofloxacin QID OS (order placed by ophthalmology)  - continue artificial tears q2 hours OU. Lacrilube ointment at night. (order placed by ophthalmology)  - Ophthalmology to follow closely.     #Preseptal Cellulitis OS  - Per outside hospital notes patient was receiving PO systemic antibiotics for preseptal cellulitis and was improving.   - Given unclear etiology of worsening of patient's ophthalmic exam, would consider broadening abx coverage and switching to IV.   - Mgmt per ID    Pt seen with Dr. Tyson    Outpatient Follow-up: Patient should follow-up with his/her ophthalmologist or with [facility] Department of Ophthalmology within 1 week of after discharge at:    [address] [facility]  [place] [address]  [phone]   82y female with a past medical history of diabetes, anemia, bipolar disorder, delirium/psychosis, ocular history of PCIOL OU, transferred for ophthalmology evaluation of Herpes Zoster Ophthalmicus OS. Pt was being treated at outside hospital with Valtrex 1g PO TID, as well as systemic antibiotics for superimposed pre-septal cellulitis.     #Herpes Zoster V1 OS  - CTA shows preseptal cellulitis with edema of the left preseptal space, CTA negative, right thyroid lobe extending into mediastinum with recommend ultrasound (possible 2nd order Horners?)  - MRI negative  - Pt with anterior chamber heme and inflammation, improved today. Will keep current mgmt  - continue started pred forte QID and cyclogyl TID . (order placed by opthalmology)  - Continue IV antivirals per primary  team  - Continue Erythromycin ointment to eyelids AND eye TID OS (order placed by opthalmology)  - continue Ofloxacin QID OS (order placed by ophthalmology)  - continue artificial tears q2 hours OU. Lacrilube ointment at night. (order placed by ophthalmology)  - Ophthalmology to follow.     #Preseptal Cellulitis OS  - Per outside hospital notes patient was receiving PO systemic antibiotics for preseptal cellulitis and was improving.   - Given unclear etiology of worsening of patient's ophthalmic exam, would consider broadening abx coverage and switching to IV.   - Mgmt per ID    Pt seen with Dr. Tyson    Outpatient Follow-up: Patient should follow-up with his/her ophthalmologist or with [facility] Department of Ophthalmology within 1 week of after discharge at:    [address]  [place] [address]  [phone]

## 2022-12-27 NOTE — PROGRESS NOTE ADULT - ASSESSMENT
82-year-old female with a past medical history most significant for diabetes, breast cancer, bipolar disorder who is admitted to the hospital due to worsening left-sided facial redness and swelling.     Patient initially admitted to [facility] from 12/20/2022 to 12/23/2022.  ID consulted there for preseptal cellulitis, started on empiric antibiotics consisting of vancomycin and ceftriaxone metronidazole.  Patient then developed vesicular and pustular lesions with concern for herpes zoster ophthalmicus the patient was started on valacyclovir.  Patient was then transferred to [facility] for ophthalmology evaluation.     Upon arrival to [facility], patient afebrile, hemodynamically stable.  Latest labs show development of leukocytosis to 11.2, thrombocytosis 491.  CMP with renal and hepatic function within normal limits.  Blood cultures obtained from 12/20/2022 remain negative.  MRI brain obtained on 12/25/2022 shows no acute intracranial pathology, left orbit preseptal soft tissue swelling noted.  Ophthalmology evaluated on admission, holding off intra vitreal injection at this time.     Impression  #Abnormal imaging of the head   #Preseptal cellulitis   #Herpes zoster ophthalmicus    #Leukocytosis     Recommendations   Can discontinue IV acyclovir, start PO acycylovir  Continue PO amox/clav for preseptal cellulitis  No further intervention per ophthalmology  Follow renal function daily  Follow fever curve and WBC count    Nicola Aranda MD  Division of Infectious Diseases 82-year-old female with a past medical history most significant for diabetes, breast cancer, bipolar disorder who is admitted to the hospital due to worsening left-sided facial redness and swelling.      Impression  #Abnormal imaging of the head   #Preseptal cellulitis   #Herpes zoster ophthalmicus    #Leukocytosis, resolved     Recommendations   Can discontinue IV acyclovir, start PO acycylovir 1g TID - plan for 7 days total (IV and PO) of treatment  Continue PO amox/clav for preseptal cellulitis, continue for 7 days total  No further intervention per ophthalmology  Follow renal function daily  Follow fever curve and WBC count    Nicola Aranda MD  Division of Infectious Diseases 82-year-old female with a past medical history most significant for diabetes, breast cancer, bipolar disorder who is admitted to the hospital due to worsening left-sided facial redness and swelling.      Impression  #Abnormal imaging of the head   #Preseptal cellulitis   #Herpes zoster ophthalmicus    #Leukocytosis, resolved     Recommendations   Can discontinue IV acyclovir, start PO valacycylovir 1g TID - plan for 7 days total (IV and PO) of treatment  Continue PO amox/clav for preseptal cellulitis, continue for 7 days total  No further intervention per ophthalmology  Follow renal function daily  Follow fever curve and WBC count    Nicola Aranda MD  Division of Infectious Diseases

## 2022-12-27 NOTE — PROGRESS NOTE ADULT - SUBJECTIVE AND OBJECTIVE BOX
[facility] DEPARTMENT OF OPHTHALMOLOGY  ------------------------------------------------------------------------------  Dennis Reyes MD PGY-3  Pager: [phone], also available on teams  ------------------------------------------------------------------------------    Following for HZV OS  Interval History: Pt still altered today, but more cooperative. Pleasant. Uses repetitive words and sayings.     MEDICATIONS  (STANDING):  acyclovir IVPB 750 milliGRAM(s) IV Intermittent every 8 hours  amoxicillin  875 milliGRAM(s)/clavulanate 1 Tablet(s) Oral two times a day  artificial tears (preservative free) Ophthalmic Solution 1 Drop(s) Both EYES every 2 hours  cinacalcet 30 milliGRAM(s) Oral daily  cyclopentolate 1% Solution 1 Drop(s) Left EYE three times a day  erythromycin   Ointment 1 Application(s) Left EYE three times a day  ferrous    sulfate Liquid 300 milliGRAM(s) Enteral Tube daily  lactobacillus acidophilus 1 Tablet(s) Oral daily  lithium 300 milliGRAM(s) Oral daily  ofloxacin 0.3% Solution 1 Drop(s) Left EYE four times a day  pantoprazole    Tablet 40 milliGRAM(s) Oral before breakfast  petrolatum Ophthalmic Ointment 1 Application(s) Both EYES at bedtime  prednisoLONE acetate 1% Suspension 1 Drop(s) Left EYE four times a day  senna 2 Tablet(s) Oral at bedtime  sodium chloride 0.9%. 1000 milliLiter(s) (60 mL/Hr) IV Continuous <Continuous>    MEDICATIONS  (PRN):  acetaminophen     Tablet .. 650 milliGRAM(s) Oral every 6 hours PRN Temp greater or equal to 38C (100.4F), Mild Pain (1 - 3)  diphenhydrAMINE 25 milliGRAM(s) Oral every 6 hours PRN Rash and/or Itching  magnesium hydroxide Suspension 30 milliLiter(s) Oral daily PRN Constipation  melatonin 3 milliGRAM(s) Oral at bedtime PRN Insomnia  polyethylene glycol 3350 17 Gram(s) Oral daily PRN Constipation  traMADol 50 milliGRAM(s) Oral four times a day PRN Severe Pain (7 - 10)    VITALS: T(C): 36.7 (12-27-22 @ 13:55)  T(F): 98 (12-27-22 @ 13:55), Max: 98.3 (12-26-22 @ 21:57)  HR: 62 (12-27-22 @ 13:55) (62 - 66)  BP: 134/66 (12-27-22 @ 13:55) (122/65 - 137/69)  RR:  (17 - 17)  SpO2:  (99% - 100%)  Wt(kg): --  General: AAO x 3, appropriate mood and affect    Ophthalmology Exam:  Visual acuity (CC): OD 20/20 OS 20/70  Pupils: Round and reactive OU. OD: 1mm light, 4 mm dark. OS: pharm dilated  Tonometry:  OD pt refused, 19 OS  Extraocular movements (EOMs): Grossly full, upgaze deficit in both eyes, although patient is difficult to direct through exam.     Slit Lamp Exam  External: Normal OD. V1 distribution vesicular rash, crusting over. Left facial droop. Able to open lid more   Lids/Lashes/Lacrimal Ducts: Flat OD. 1+ upper and lower eyelid erythema. Multiple eyelid margin regions.  Sclera/Conjunctiva: White and quiet OD. OS: 2+ injection. Discharge.   Cornea: Clear OD. OS: diffuse SPK OU. D-folds and microcystic edema. No stromal opacities. no pseudodendrites  Anterior Chamber: Deep and formed OD. OS: Hazy. 4+ cell, mixture of pigmented and non-pigmented cells  Iris: Flat OU.  Lens: PCIOL OU.    Fundus Exam: dilated with 1% tropicamide and 2.5% phenylephrine  Approval obtained from primary team for dilation  Patient aware that pupils can remained dilated for at least 4-6 hours.  Exam performed with 20 D lens    Vitreous: wnl OD. OS: continued hazy exam. poir view to posterior pole.    Disc, cup/disc: OD: sharp and pink, 0.5 OU. OS: optic nerve intact  Macula: Flat OD. No clear view OS.    Vessels: wnl OD. No clear view OS  Periphery: Flat OU. No clear view OS.      [facility] DEPARTMENT OF OPHTHALMOLOGY  ------------------------------------------------------------------------------  Dennis Reyes MD PGY-3  Pager: [phone], also available on teams  ------------------------------------------------------------------------------    Following for HZV OS  Interval History: Pt still altered today, but more cooperative. Pleasant. Uses repetitive words and sayings.     MEDICATIONS  (STANDING):  acyclovir IVPB 750 milliGRAM(s) IV Intermittent every 8 hours  amoxicillin  875 milliGRAM(s)/clavulanate 1 Tablet(s) Oral two times a day  artificial tears (preservative free) Ophthalmic Solution 1 Drop(s) Both EYES every 2 hours  cinacalcet 30 milliGRAM(s) Oral daily  cyclopentolate 1% Solution 1 Drop(s) Left EYE three times a day  erythromycin   Ointment 1 Application(s) Left EYE three times a day  ferrous    sulfate Liquid 300 milliGRAM(s) Enteral Tube daily  lactobacillus acidophilus 1 Tablet(s) Oral daily  lithium 300 milliGRAM(s) Oral daily  ofloxacin 0.3% Solution 1 Drop(s) Left EYE four times a day  pantoprazole    Tablet 40 milliGRAM(s) Oral before breakfast  petrolatum Ophthalmic Ointment 1 Application(s) Both EYES at bedtime  prednisoLONE acetate 1% Suspension 1 Drop(s) Left EYE four times a day  senna 2 Tablet(s) Oral at bedtime  sodium chloride 0.9%. 1000 milliLiter(s) (60 mL/Hr) IV Continuous <Continuous>    MEDICATIONS  (PRN):  acetaminophen     Tablet .. 650 milliGRAM(s) Oral every 6 hours PRN Temp greater or equal to 38C (100.4F), Mild Pain (1 - 3)  diphenhydrAMINE 25 milliGRAM(s) Oral every 6 hours PRN Rash and/or Itching  magnesium hydroxide Suspension 30 milliLiter(s) Oral daily PRN Constipation  melatonin 3 milliGRAM(s) Oral at bedtime PRN Insomnia  polyethylene glycol 3350 17 Gram(s) Oral daily PRN Constipation  traMADol 50 milliGRAM(s) Oral four times a day PRN Severe Pain (7 - 10)    VITALS: T(C): 36.7 (12-27-22 @ 13:55)  T(F): 98 (12-27-22 @ 13:55), Max: 98.3 (12-26-22 @ 21:57)  HR: 62 (12-27-22 @ 13:55) (62 - 66)  BP: 134/66 (12-27-22 @ 13:55) (122/65 - 137/69)  RR:  (17 - 17)  SpO2:  (99% - 100%)  Wt(kg): --  General: AAO x 3, appropriate mood and affect    Ophthalmology Exam:  Visual acuity (CC): OD 20/20 OS 20/70  Pupils: Round and reactive OU. OD: 1mm light, 4 mm dark. OS: pharm dilated  Tonometry:  OD pt refused, 19 OS  Extraocular movements (EOMs): Grossly full, upgaze deficit in both eyes, although patient is difficult to direct through exam.     Slit Lamp Exam  External: Normal OD. V1 distribution vesicular rash, crusting over. Left facial droop. Able to open lid more   Lids/Lashes/Lacrimal Ducts: Flat OD. 1+ upper and lower eyelid erythema. Multiple eyelid margin regions.  Sclera/Conjunctiva: White and quiet OD. OS: 2+ injection. Discharge.   Cornea: Clear OD. OS: diffuse SPK OU. D-folds and microcystic edema. No stromal opacities. no pseudodendrites  Anterior Chamber: Deep and formed OD. OS: Hazy. 3+ cell, mixture of pigmented and non-pigmented cells  Iris: Flat OU.  Lens: PCIOL OU.    Fundus Exam: dilated with 1% tropicamide and 2.5% phenylephrine  Approval obtained from primary team for dilation  Patient aware that pupils can remained dilated for at least 4-6 hours.  Exam performed with 20 D lens    Vitreous: wnl OD. OS: continued hazy exam. poir view to posterior pole.    Disc, cup/disc: OD: sharp and pink, 0.5 OU. OS: optic nerve intact  Macula: Flat OD. No clear view OS.    Vessels: wnl OD. No clear view OS  Periphery: Flat OU. No clear view OS.

## 2022-12-27 NOTE — PROGRESS NOTE ADULT - SUBJECTIVE AND OBJECTIVE BOX
Follow Up:  zoster, cellulitis    Interval History/ROS:    Overnight: No acute events.  Patient remains afebrile and hemodynamically stable on room air.  Latest labs show resolved leukocytosis at 9.6, thrombocytosis 5 to, BMP with renal function within normal limits.    Patient seen examined at bedside.  Denies any new pain or discomfort.    Allergies  No Known Allergies        ANTIMICROBIALS:  acyclovir IVPB 750 every 8 hours  amoxicillin  875 milliGRAM(s)/clavulanate 1 two times a day      OTHER MEDS:  MEDICATIONS  (STANDING):  acetaminophen     Tablet .. 650 every 6 hours PRN  cinacalcet 30 daily  diphenhydrAMINE 25 every 6 hours PRN  lithium 300 daily  magnesium hydroxide Suspension 30 daily PRN  melatonin 3 at bedtime PRN  pantoprazole    Tablet 40 before breakfast  polyethylene glycol 3350 17 daily PRN  senna 2 at bedtime  traMADol 50 four times a day PRN      Vital Signs Last 24 Hrs  T(C): 36.6 (27 Dec 2022 09:02), Max: 36.9 (26 Dec 2022 16:39)  T(F): 97.8 (27 Dec 2022 09:02), Max: 98.5 (26 Dec 2022 16:39)  HR: 66 (27 Dec 2022 09:02) (64 - 74)  BP: 137/69 (27 Dec 2022 09:02) (118/58 - 137/69)  BP(mean): --  RR: 17 (27 Dec 2022 09:02) (17 - 17)  SpO2: 100% (27 Dec 2022 09:02) (97% - 100%)    Parameters below as of 27 Dec 2022 09:02  Patient On (Oxygen Delivery Method): room air        PHYSICAL EXAM:  Constitutional:  well preserved, comfortable  Head/Eyes: L eye surrounding erythema and vesicles, left eye conjunctivitis    ENT:  supple; no thrush  LUNGS:  CTA  CVS:  normal S1, S2, no murmur  Abd:  soft, non-tender; non-distended  Ext:  no edema  Vascular:  IV site no erythema tenderness or discharge  MSK:  joints without swelling  Neuro: AAO X 3, non- focal                              9.7    9.65  )-----------( 510      ( 27 Dec 2022 08:18 )             31.9       12-27    135  |  105  |  17  ----------------------------<  105<H>  3.9   |  21<L>  |  0.62    Ca    10.4      27 Dec 2022 08:18            MICROBIOLOGY:  v                  RADIOLOGY:   Follow Up:  zoster, cellulitis    Interval History/ROS:    Overnight: No acute events.  Patient remains afebrile and hemodynamically stable on room air.  Latest labs show resolved leukocytosis at 9.6, thrombocytosis 5 to, BMP with renal function within normal limits.    Patient seen examined at bedside.  Denies any new pain or discomfort.    Allergies  No Known Allergies        ANTIMICROBIALS:  acyclovir IVPB 750 every 8 hours  amoxicillin  875 milliGRAM(s)/clavulanate 1 two times a day      OTHER MEDS:  MEDICATIONS  (STANDING):  acetaminophen     Tablet .. 650 every 6 hours PRN  cinacalcet 30 daily  diphenhydrAMINE 25 every 6 hours PRN  lithium 300 daily  magnesium hydroxide Suspension 30 daily PRN  melatonin 3 at bedtime PRN  pantoprazole    Tablet 40 before breakfast  polyethylene glycol 3350 17 daily PRN  senna 2 at bedtime  traMADol 50 four times a day PRN      Vital Signs Last 24 Hrs  T(C): 36.6 (27 Dec 2022 09:02), Max: 36.9 (26 Dec 2022 16:39)  T(F): 97.8 (27 Dec 2022 09:02), Max: 98.5 (26 Dec 2022 16:39)  HR: 66 (27 Dec 2022 09:02) (64 - 74)  BP: 137/69 (27 Dec 2022 09:02) (118/58 - 137/69)  BP(mean): --  RR: 17 (27 Dec 2022 09:02) (17 - 17)  SpO2: 100% (27 Dec 2022 09:02) (97% - 100%)    Parameters below as of 27 Dec 2022 09:02  Patient On (Oxygen Delivery Method): room air        PHYSICAL EXAM:  Constitutional:  well preserved, comfortable  Head/Eyes: L eye surrounding erythema and vesicles, left eye conjunctivitis    ENT:  supple; no thrush  LUNGS:  CTA  CVS:  normal S1, S2, no murmur  Abd:  soft, non-tender; non-distended  Ext:  no edema  Vascular:  IV site no erythema tenderness or discharge  MSK:  joints without swelling  Neuro: AAO X 3, non- focal                              9.7    9.65  )-----------( 510      ( 27 Dec 2022 08:18 )             31.9       12-27    135  |  105  |  17  ----------------------------<  105<H>  3.9   |  21<L>  |  0.62    Ca    10.4      27 Dec 2022 08:18

## 2022-12-28 ENCOUNTER — TRANSCRIPTION ENCOUNTER (OUTPATIENT)
Age: 82
End: 2022-12-28

## 2022-12-28 VITALS
HEART RATE: 73 BPM | TEMPERATURE: 99 F | DIASTOLIC BLOOD PRESSURE: 71 MMHG | OXYGEN SATURATION: 98 % | SYSTOLIC BLOOD PRESSURE: 154 MMHG | RESPIRATION RATE: 18 BRPM

## 2022-12-28 LAB
ANION GAP SERPL CALC-SCNC: 14 MMOL/L — SIGNIFICANT CHANGE UP (ref 5–17)
BUN SERPL-MCNC: 13 MG/DL — SIGNIFICANT CHANGE UP (ref 7–23)
CALCIUM SERPL-MCNC: 10 MG/DL — SIGNIFICANT CHANGE UP (ref 8.4–10.5)
CHLORIDE SERPL-SCNC: 106 MMOL/L — SIGNIFICANT CHANGE UP (ref 96–108)
CO2 SERPL-SCNC: 17 MMOL/L — LOW (ref 22–31)
CREAT SERPL-MCNC: 0.59 MG/DL — SIGNIFICANT CHANGE UP (ref 0.5–1.3)
EGFR: 90 ML/MIN/1.73M2 — SIGNIFICANT CHANGE UP
GLUCOSE SERPL-MCNC: 77 MG/DL — SIGNIFICANT CHANGE UP (ref 70–99)
HCT VFR BLD CALC: 32.7 % — LOW (ref 34.5–45)
HGB BLD-MCNC: 9.9 G/DL — LOW (ref 11.5–15.5)
MCHC RBC-ENTMCNC: 29.2 PG — SIGNIFICANT CHANGE UP (ref 27–34)
MCHC RBC-ENTMCNC: 30.3 GM/DL — LOW (ref 32–36)
MCV RBC AUTO: 96.5 FL — SIGNIFICANT CHANGE UP (ref 80–100)
NRBC # BLD: 0 /100 WBCS — SIGNIFICANT CHANGE UP (ref 0–0)
PLATELET # BLD AUTO: 518 K/UL — HIGH (ref 150–400)
POTASSIUM SERPL-MCNC: 3.9 MMOL/L — SIGNIFICANT CHANGE UP (ref 3.5–5.3)
POTASSIUM SERPL-SCNC: 3.9 MMOL/L — SIGNIFICANT CHANGE UP (ref 3.5–5.3)
RBC # BLD: 3.39 M/UL — LOW (ref 3.8–5.2)
RBC # FLD: 15.9 % — HIGH (ref 10.3–14.5)
SODIUM SERPL-SCNC: 137 MMOL/L — SIGNIFICANT CHANGE UP (ref 135–145)
WBC # BLD: 8.56 K/UL — SIGNIFICANT CHANGE UP (ref 3.8–10.5)
WBC # FLD AUTO: 8.56 K/UL — SIGNIFICANT CHANGE UP (ref 3.8–10.5)

## 2022-12-28 PROCEDURE — 99232 SBSQ HOSP IP/OBS MODERATE 35: CPT

## 2022-12-28 RX ORDER — CYCLOPENTOLATE HYDROCHLORIDE 10 MG/ML
1 SOLUTION/ DROPS OPHTHALMIC
Qty: 0 | Refills: 0 | DISCHARGE
Start: 2022-12-28

## 2022-12-28 RX ORDER — DIPHENHYDRAMINE HCL 50 MG
1 CAPSULE ORAL
Qty: 0 | Refills: 0 | DISCHARGE
Start: 2022-12-28

## 2022-12-28 RX ORDER — TRAMADOL HYDROCHLORIDE 50 MG/1
1 TABLET ORAL
Qty: 0 | Refills: 0 | DISCHARGE
Start: 2022-12-28

## 2022-12-28 RX ORDER — PREDNISOLONE SODIUM PHOSPHATE 1 %
1 DROPS OPHTHALMIC (EYE)
Qty: 0 | Refills: 0 | DISCHARGE
Start: 2022-12-28

## 2022-12-28 RX ORDER — ACYCLOVIR SODIUM 500 MG
1 VIAL (EA) INTRAVENOUS
Qty: 0 | Refills: 0 | DISCHARGE
Start: 2022-12-28

## 2022-12-28 RX ORDER — ACYCLOVIR SODIUM 500 MG
1000 VIAL (EA) INTRAVENOUS EVERY 8 HOURS
Refills: 0 | Status: DISCONTINUED | OUTPATIENT
Start: 2022-12-28 | End: 2022-12-28

## 2022-12-28 RX ORDER — SENNA PLUS 8.6 MG/1
2 TABLET ORAL
Qty: 0 | Refills: 0 | DISCHARGE
Start: 2022-12-28

## 2022-12-28 RX ORDER — ERYTHROMYCIN BASE 5 MG/GRAM
1 OINTMENT (GRAM) OPHTHALMIC (EYE)
Qty: 0 | Refills: 0 | DISCHARGE
Start: 2022-12-28

## 2022-12-28 RX ORDER — OFLOXACIN 0.3 %
1 DROPS OPHTHALMIC (EYE)
Qty: 0 | Refills: 0 | DISCHARGE
Start: 2022-12-28

## 2022-12-28 RX ADMIN — Medication 1 DROP(S): at 17:14

## 2022-12-28 RX ADMIN — Medication 1000 MILLIGRAM(S): at 17:13

## 2022-12-28 RX ADMIN — Medication 1 DROP(S): at 17:15

## 2022-12-28 RX ADMIN — Medication 1 APPLICATION(S): at 14:06

## 2022-12-28 RX ADMIN — CYCLOPENTOLATE HYDROCHLORIDE 1 DROP(S): 10 SOLUTION/ DROPS OPHTHALMIC at 05:08

## 2022-12-28 RX ADMIN — Medication 1 TABLET(S): at 17:14

## 2022-12-28 RX ADMIN — Medication 1 DROP(S): at 05:07

## 2022-12-28 RX ADMIN — CYCLOPENTOLATE HYDROCHLORIDE 1 DROP(S): 10 SOLUTION/ DROPS OPHTHALMIC at 14:06

## 2022-12-28 RX ADMIN — Medication 1 DROP(S): at 14:06

## 2022-12-28 RX ADMIN — Medication 300 MILLIGRAM(S): at 14:06

## 2022-12-28 RX ADMIN — Medication 1 DROP(S): at 07:52

## 2022-12-28 RX ADMIN — Medication 1 APPLICATION(S): at 05:07

## 2022-12-28 RX ADMIN — Medication 1 TABLET(S): at 12:51

## 2022-12-28 RX ADMIN — Medication 1 DROP(S): at 00:27

## 2022-12-28 RX ADMIN — Medication 1 DROP(S): at 11:45

## 2022-12-28 RX ADMIN — Medication 165 MILLIGRAM(S): at 05:19

## 2022-12-28 RX ADMIN — PANTOPRAZOLE SODIUM 40 MILLIGRAM(S): 20 TABLET, DELAYED RELEASE ORAL at 05:09

## 2022-12-28 RX ADMIN — LITHIUM CARBONATE 300 MILLIGRAM(S): 300 TABLET, EXTENDED RELEASE ORAL at 12:51

## 2022-12-28 RX ADMIN — Medication 1 TABLET(S): at 05:21

## 2022-12-28 RX ADMIN — CINACALCET 30 MILLIGRAM(S): 30 TABLET, FILM COATED ORAL at 11:45

## 2022-12-28 RX ADMIN — Medication 1 DROP(S): at 10:41

## 2022-12-28 RX ADMIN — Medication 1 DROP(S): at 11:46

## 2022-12-28 NOTE — DISCHARGE NOTE PROVIDER - CARE PROVIDER_API CALL
Lamar Tyson)  Ophthalmology  [phone] [place] [address]  Phone: [phone]  Fax: [phone]  Established Patient  Follow Up Time: 1-3 days

## 2022-12-28 NOTE — PHYSICAL THERAPY INITIAL EVALUATION ADULT - ADDITIONAL COMMENTS
Per CM Note: Pt son reports patient is known to [facility] and plan is for patient to return to facility. He further reports the nursing home is in the process of obtaining Medicaid for patient. Previously patient was residing with her spouse in a private house with 4 steps to enter and a chairlift inside. Prior to her nursing home stay, she was independent with a rolling walker. She has a rolling walker and cane at home.

## 2022-12-28 NOTE — DISCHARGE NOTE NURSING/CASE MANAGEMENT/SOCIAL WORK - NSDCPEFALRISK_GEN_ALL_CORE
For information on Fall & Injury Prevention, visit: [email or web address] OR  [email or web address] OR  https://www.cdc.gov/steadi/patient.html

## 2022-12-28 NOTE — PROGRESS NOTE ADULT - SUBJECTIVE AND OBJECTIVE BOX
Follow Up:  Zoster, cellulitis    Interval History/ROS:  Overnight: No acute events.  Patient remains afebrile.  Latest labs show no leukocytosis, thrombocytosis 518.  CMP with renal function within normal limits.    Patient seen examined at bedside.          Allergies  No Known Allergies        ANTIMICROBIALS:  acyclovir   Oral Tab/Cap 1000 every 8 hours  amoxicillin  875 milliGRAM(s)/clavulanate 1 two times a day      OTHER MEDS:  MEDICATIONS  (STANDING):  acetaminophen     Tablet .. 650 every 6 hours PRN  cinacalcet 30 daily  diphenhydrAMINE 25 every 6 hours PRN  lithium 300 daily  magnesium hydroxide Suspension 30 daily PRN  melatonin 3 at bedtime PRN  pantoprazole    Tablet 40 before breakfast  polyethylene glycol 3350 17 daily PRN  senna 2 at bedtime  traMADol 50 four times a day PRN      Vital Signs Last 24 Hrs  T(C): 36.3 (28 Dec 2022 12:45), Max: 36.7 (27 Dec 2022 13:55)  T(F): 97.4 (28 Dec 2022 12:45), Max: 98 (27 Dec 2022 13:55)  HR: 67 (28 Dec 2022 12:45) (62 - 71)  BP: 127/71 (28 Dec 2022 12:45) (127/71 - 158/77)  BP(mean): --  RR: 18 (28 Dec 2022 12:45) (17 - 18)  SpO2: 98% (28 Dec 2022 12:45) (96% - 100%)    Parameters below as of 28 Dec 2022 12:45  Patient On (Oxygen Delivery Method): room air        PHYSICAL EXAM:  Constitutional:  well preserved, comfortable  Head/Eyes: L eye surrounding erythema and vesicles, left eye conjunctivitis    ENT:  supple; no thrush  LUNGS:  CTA  CVS:  normal S1, S2, no murmur  Abd:  soft, non-tender; non-distended  Ext:  no edema  Vascular:  IV site no erythema tenderness or discharge  MSK:  joints without swelling  Neuro: AAO X 3, non- focal                              9.9    8.56  )-----------( 518      ( 28 Dec 2022 06:31 )             32.7       12-28    137  |  106  |  13  ----------------------------<  77  3.9   |  17<L>  |  0.59    Ca    10.0      28 Dec 2022 06:31       Follow Up:  Zoster, cellulitis    Interval History/ROS:  Overnight: No acute events.  Patient remains afebrile.  Latest labs show no leukocytosis, thrombocytosis 518.  CMP with renal function within normal limits.    Patient seen examined at bedside.          Allergies  No Known Allergies        ANTIMICROBIALS:  acyclovir   Oral Tab/Cap 1000 every 8 hours  amoxicillin  875 milliGRAM(s)/clavulanate 1 two times a day      OTHER MEDS:  MEDICATIONS  (STANDING):  acetaminophen     Tablet .. 650 every 6 hours PRN  cinacalcet 30 daily  diphenhydrAMINE 25 every 6 hours PRN  lithium 300 daily  magnesium hydroxide Suspension 30 daily PRN  melatonin 3 at bedtime PRN  pantoprazole    Tablet 40 before breakfast  polyethylene glycol 3350 17 daily PRN  senna 2 at bedtime  traMADol 50 four times a day PRN      Vital Signs Last 24 Hrs  T(C): 36.3 (28 Dec 2022 12:45), Max: 36.7 (27 Dec 2022 13:55)  T(F): 97.4 (28 Dec 2022 12:45), Max: 98 (27 Dec 2022 13:55)  HR: 67 (28 Dec 2022 12:45) (62 - 71)  BP: 127/71 (28 Dec 2022 12:45) (127/71 - 158/77)  BP(mean): --  RR: 18 (28 Dec 2022 12:45) (17 - 18)  SpO2: 98% (28 Dec 2022 12:45) (96% - 100%)    Parameters below as of 28 Dec 2022 12:45  Patient On (Oxygen Delivery Method): room air        PHYSICAL EXAM:  Constitutional:  well preserved, comfortable  Head/Eyes: L eye surrounding erythema and crusting vesicles, left eye conjunctivitis    ENT:  supple; no thrush  LUNGS:  CTA  CVS:  normal S1, S2, no murmur  Abd:  soft, non-tender; non-distended  Ext:  no edema  Vascular:  IV site no erythema tenderness or discharge  MSK:  joints without swelling  Neuro: AAO X 3, non- focal                              9.9    8.56  )-----------( 518      ( 28 Dec 2022 06:31 )             32.7       12-28    137  |  106  |  13  ----------------------------<  77  3.9   |  17<L>  |  0.59    Ca    10.0      28 Dec 2022 06:31

## 2022-12-28 NOTE — PHYSICAL THERAPY INITIAL EVALUATION ADULT - GENERAL OBSERVATIONS, REHAB EVAL
Pt received semi-supine in bed, A&Ox2, +pleasantly confused, +female ext cath, +IVL, debbie 45 min PT eval.

## 2022-12-28 NOTE — PHYSICAL THERAPY INITIAL EVALUATION ADULT - PERTINENT HX OF CURRENT PROBLEM, REHAB EVAL
82-year-old female with history of T2DM (A1C 5.4 from Dec 22), anemia, bipolar disorder, delirium/psychosis, breast cancer s/p lumpectomy and RT, from [facility], who was brought in by EMS with left facial redness and swelling initially to [facility] on Dec 20, now transferred to [facility] for ophthalmology evaluation of Herpes Zoster Ophthalmicus OS. Pt was being treated at outside hospital with Valtrex 1g PO TID, as well as systemic antibiotics for superimposed pre-septal cellulitis.     CT angio head: CT HEAD: Mild subcutaneous edema within the left preseptal space, consistent with known history of orbital cellulitis. No acute intra-cranial hemorrhage, mass effect, or midline shift. Consider MRI of the brain as clinically warranted. CTA BRAIN/NECK: Patent anterior and posterior circulation. No  flow-limiting stenosis or occlusion. Large heterogeneous nodule/mass within the right thyroid lobe extending  into the mediastinum. Recommend thyroid ultrasound for further evaluation. MR head and orbit: No acute intracranial hemorrhage, acute infarction, extra-axial fluid collection or hydrocephalus. Moderate left orbit preseptal soft tissue swelling, compatible with known left preseptal cellulitis. No evidence of postseptal cellulitis.

## 2022-12-28 NOTE — DISCHARGE NOTE NURSING/CASE MANAGEMENT/SOCIAL WORK - PATIENT PORTAL LINK FT
You can access the FollowMyHealth Patient Portal offered by [facility] by registering at the following website: [email or web address]. By joining [employer]’s FollowMyHealth portal, you will also be able to view your health information using other applications (apps) compatible with our system.

## 2022-12-28 NOTE — DISCHARGE NOTE PROVIDER - NSDCMRMEDTOKEN_GEN_ALL_CORE_FT
acyclovir: 1 gram(s) orally 3 times a day for 4 more days  amoxicillin-clavulanate 875 mg-125 mg oral tablet: 1 tab(s) orally 2 times a day  bisacodyl 10 mg rectal suppository: 1 suppository(ies) rectal once a day, As needed, Constipation  cinacalcet 30 mg oral tablet: 1 tab(s) orally once a day  cyclopentolate 1% ophthalmic solution: 1 drop(s) to each affected eye 3 times a day  diphenhydrAMINE 25 mg oral capsule: 1 cap(s) orally every 6 hours, As needed, Rash and/or Itching  erythromycin 0.5% ophthalmic ointment: 1 application to each affected eye 3 times a day  ferrous sulfate 300 mg/5 mL (60 mg/5 mL elemental iron) oral liquid: 5 milliliter(s) orally once a day  insulin lispro 100 units/mL injectable solution: injectable 3 times a day (before meals)sliding scale   lactobacillus acidophilus oral tablet: 2 tab(s) orally once a day  lithium 300 mg oral tablet: 1 tab(s) orally once a day  magnesium hydroxide 8% oral suspension: 30 milliliter(s) orally once a day, As needed, Constipation  melatonin 3 mg oral tablet: 1 tab(s) orally once a day (at bedtime), As needed, Insomnia  ocular lubricant ophthalmic ointment: 1 application to each affected eye once a day (at bedtime)  ocular lubricant ophthalmic solution: 1 drop(s) to each affected eye every 2 hours  ofloxacin 0.3% ophthalmic solution: 1 drop(s) to each affected eye 4 times a day  pantoprazole 40 mg oral delayed release tablet: 1 tab(s) orally once a day  polyethylene glycol 3350 oral powder for reconstitution: 17 gram(s) orally once a day, As needed, Constipation  prednisoLONE acetate 1% ophthalmic suspension: 1 drop(s) to each affected eye 4 times a day  senna leaf extract oral tablet: 2 tab(s) orally once a day (at bedtime)  traMADol 50 mg oral tablet: 1 tab(s) orally 4 times a day, As needed, Severe Pain (7 - 10)

## 2022-12-28 NOTE — PHYSICAL THERAPY INITIAL EVALUATION ADULT - TRANSFER TRAINING, PT EVAL
GOAL: Pt will perform ALL transfers with Simone, w/use of appropriate assistive device as needed, in 4 weeks.

## 2022-12-28 NOTE — DISCHARGE NOTE PROVIDER - NSDCCPCAREPLAN_GEN_ALL_CORE_FT
PRINCIPAL DISCHARGE DIAGNOSIS  Diagnosis: Eye pain  Assessment and Plan of Treatment: #Herpes Zoster V1 OS  - CTA shows preseptal cellulitis with edema of the left preseptal space, CTA negative, right thyroid lobe extending into mediastinum with recommend ultrasound (possible 2nd order Horners?)  - MRI negative  - Pt with anterior chamber heme and inflammation, improved today. Will keep current mgmt  - continue started pred forte QID and cyclogyl TID . (order placed by opthalmology)  - Continue Erythromycin ointment to eyelids AND eye TID OS (order placed by opthalmology)  - continue Ofloxacin QID OS (order placed by ophthalmology)  - continue artificial tears . Lacrilube ointment at night.  -  #Preseptal Cellulitis OS  -c/w acyclovir as indicated for herpetic lesions and augumentin for facial infection   Pt seen with Dr. Tyson  Outpatient Follow-up: Patient should follow-up  with [facility] Department of Ophthalmology december 30th please call for an appt (Very important )   [address]  [place] [address]  [phone]      SECONDARY DISCHARGE DIAGNOSES  Diagnosis: Bipolar disorder  Assessment and Plan of Treatment: c/w lithium and follow up with psychiatry at NH    Diagnosis: Facial cellulitis  Assessment and Plan of Treatment: continue with augumentin for 4 more days and follow up with opthalmology    Diagnosis: History of diabetes mellitus  Assessment and Plan of Treatment: c/w insulin and check fingerstick as per protocol

## 2022-12-28 NOTE — DISCHARGE NOTE PROVIDER - NSDCFUADDAPPT_GEN_ALL_CORE_FT
APPTS ARE READY TO BE MADE: [ ] YES    Best Family or Patient Contact (if needed):    Additional Information about above appointments (if needed):    1: opthalmology Dr gaston  2: pcp  3:     Other comments or requests:

## 2022-12-28 NOTE — PROGRESS NOTE ADULT - PROVIDER SPECIALTY LIST ADULT
Patient was brought to the ER by EMS after the police came to the home  Both patient and the mother confirm that it was Maged Manley that called the police  Patient was agitated, not threatening but mother would not let him in the house, which further agitated him prompting him to call the police  Patient denies any SI/HI and also denies any A/V hallucinations  Mom reports that he seems to respond to voices and also is irritable with mood swings, but patient has never been assaultive or threatening  Patient has an appointment at [facility] set up on Friday to see the psychiatrist  Khadra Cole also informed me that he got his MA reinstated as of Monday  He had been on an antipsychotic and mom is going to talk to the nurse at the clinic about getting him back on an anti-psychotic injectible  Patient had been on invega but it was discontinued when patient lost his insurance  Patient does not wish to sign into the hospital and there are no grounds for involuntary  Patient will be discharged to follow up at the [facility] 
Ophthalmology
Infectious Disease
Ophthalmology
Infectious Disease
Ophthalmology
Hospitalist

## 2022-12-28 NOTE — PROGRESS NOTE ADULT - REASON FOR ADMISSION
Opthalmology eval

## 2022-12-28 NOTE — DISCHARGE NOTE PROVIDER - NSDCHC_MEDRECSTATUS_GEN_ALL_CORE
Admission Reconciliation is Completed  Discharge Reconciliation is Completed Valtrex Pregnancy And Lactation Text: this medication is Pregnancy Category B and is considered safe during pregnancy. This medication is not directly found in breast milk but it's metabolite acyclovir is present.

## 2022-12-28 NOTE — PROGRESS NOTE ADULT - ASSESSMENT
82-year-old female with a past medical history most significant for diabetes, breast cancer, bipolar disorder who is admitted to the hospital due to worsening left-sided facial redness and swelling.      Impression  #Abnormal imaging of the head   #Preseptal cellulitis   #Herpes zoster ophthalmicus    #Leukocytosis, resolved     Recommendations   ***    No further intervention per ophthalmology  Follow renal function daily  Follow fever curve and WBC count    Nicola Aranda MD  Division of Infectious Diseases 82-year-old female with a past medical history most significant for diabetes, breast cancer, bipolar disorder who is admitted to the hospital due to worsening left-sided facial redness and swelling.      Impression  #Abnormal imaging of the head   #Preseptal cellulitis   #Herpes zoster ophthalmicus    #Leukocytosis, resolved     Recommendations   Continue valacyclovir 1g TID until 1/5/23 to complete 14 days of therapy  Continue amoxicillin/clav 875 bid until 12/30/22 to complete 10 days of therapy  Patient discharged in the afternoon - these recommendations were conveyed to patient's nursing home at [facility], discussed with nursing manager Kelly Thorpe,   Will plan to follow-up tomorrow    Nicola Aranda MD  Division of Infectious Diseases

## 2022-12-28 NOTE — DISCHARGE NOTE PROVIDER - HOSPITAL COURSE
82-year-old female with history of T2DM (A1C 5.4 from Dec 22), anemia, bipolar disorder, delirium/psychosis, breast cancer s/p lumpectomy and RT, from [facility], who was brought in by EMS with left facial redness and swelling initially to [facility] on Dec 20, tx for herpes zoster and preseptal cellulitis (currently on valacyclovir and augmentin), now transferred to [facility] for ophthalmology evaluation . Patient was seen by Infectious disease and treated with acyclovir for seven days and augumentin for total of 5 days for treatment of herpes zoster ophthalmicus and preseptal cellulitis . Patient was seen by opthalmology and stated  #Herpes Zoster V1 OS  - CTA shows preseptal cellulitis with edema of the left preseptal space, CTA negative, right thyroid lobe extending into mediastinum with recommend ultrasound (possible 2nd order Horners?)  - MRI negative  - Pt with anterior chamber heme and inflammation, improved today. Will keep current mgmt  - continue started pred forte QID and cyclogyl TID . (order placed by opthalmology)  - Continue Erythromycin ointment to eyelids AND eye TID OS (order placed by opthalmology)  - continue Ofloxacin QID OS (order placed by ophthalmology)  - continue artificial tears q2 hours OU. Lacrilube ointment at night. (order placed by ophthalmology)    Outpatient Follow-up: Patient should follow-up with his/her ophthalmologist or with [facility] Department of Ophthalmology to see  on december 30th , please call the clinic :, this appt must be done [address]  [place] [address]  [phone]

## 2023-01-03 ENCOUNTER — APPOINTMENT (OUTPATIENT)
Dept: OPHTHALMOLOGY | Facility: CLINIC | Age: 83
End: 2023-01-03
Payer: MEDICARE

## 2023-01-03 ENCOUNTER — NON-APPOINTMENT (OUTPATIENT)
Age: 83
End: 2023-01-03

## 2023-01-03 PROCEDURE — 92002 INTRM OPH EXAM NEW PATIENT: CPT

## 2023-01-04 PROCEDURE — 70496 CT ANGIOGRAPHY HEAD: CPT

## 2023-01-04 PROCEDURE — 86850 RBC ANTIBODY SCREEN: CPT

## 2023-01-04 PROCEDURE — 84295 ASSAY OF SERUM SODIUM: CPT

## 2023-01-04 PROCEDURE — 85018 HEMOGLOBIN: CPT

## 2023-01-04 PROCEDURE — 83605 ASSAY OF LACTIC ACID: CPT

## 2023-01-04 PROCEDURE — 85025 COMPLETE CBC W/AUTO DIFF WBC: CPT

## 2023-01-04 PROCEDURE — 83735 ASSAY OF MAGNESIUM: CPT

## 2023-01-04 PROCEDURE — 85027 COMPLETE CBC AUTOMATED: CPT

## 2023-01-04 PROCEDURE — 70498 CT ANGIOGRAPHY NECK: CPT

## 2023-01-04 PROCEDURE — 84100 ASSAY OF PHOSPHORUS: CPT

## 2023-01-04 PROCEDURE — G1004: CPT

## 2023-01-04 PROCEDURE — 82947 ASSAY GLUCOSE BLOOD QUANT: CPT

## 2023-01-04 PROCEDURE — 82330 ASSAY OF CALCIUM: CPT

## 2023-01-04 PROCEDURE — 84132 ASSAY OF SERUM POTASSIUM: CPT

## 2023-01-04 PROCEDURE — 80048 BASIC METABOLIC PNL TOTAL CA: CPT

## 2023-01-04 PROCEDURE — 36415 COLL VENOUS BLD VENIPUNCTURE: CPT

## 2023-01-04 PROCEDURE — 70543 MRI ORBT/FAC/NCK W/O &W/DYE: CPT | Mod: MG

## 2023-01-04 PROCEDURE — 99285 EMERGENCY DEPT VISIT HI MDM: CPT | Mod: 25

## 2023-01-04 PROCEDURE — 86900 BLOOD TYPING SEROLOGIC ABO: CPT

## 2023-01-04 PROCEDURE — 97162 PT EVAL MOD COMPLEX 30 MIN: CPT

## 2023-01-04 PROCEDURE — 82435 ASSAY OF BLOOD CHLORIDE: CPT

## 2023-01-04 PROCEDURE — 86901 BLOOD TYPING SEROLOGIC RH(D): CPT

## 2023-01-04 PROCEDURE — A9585: CPT

## 2023-01-04 PROCEDURE — 80053 COMPREHEN METABOLIC PANEL: CPT

## 2023-01-04 PROCEDURE — 70553 MRI BRAIN STEM W/O & W/DYE: CPT | Mod: MG

## 2023-01-04 PROCEDURE — 85014 HEMATOCRIT: CPT

## 2023-01-04 PROCEDURE — 82803 BLOOD GASES ANY COMBINATION: CPT

## 2023-01-04 PROCEDURE — 87637 SARSCOV2&INF A&B&RSV AMP PRB: CPT

## 2023-01-11 ENCOUNTER — NON-APPOINTMENT (OUTPATIENT)
Age: 83
End: 2023-01-11

## 2023-01-11 ENCOUNTER — APPOINTMENT (OUTPATIENT)
Dept: OPHTHALMOLOGY | Facility: CLINIC | Age: 83
End: 2023-01-11
Payer: MEDICARE

## 2023-01-11 PROCEDURE — 92014 COMPRE OPH EXAM EST PT 1/>: CPT

## 2023-01-19 ENCOUNTER — APPOINTMENT (OUTPATIENT)
Dept: OPHTHALMOLOGY | Facility: CLINIC | Age: 83
End: 2023-01-19
Payer: MEDICARE

## 2023-01-19 ENCOUNTER — NON-APPOINTMENT (OUTPATIENT)
Age: 83
End: 2023-01-19

## 2023-01-19 PROCEDURE — 92012 INTRM OPH EXAM EST PATIENT: CPT

## 2023-02-09 ENCOUNTER — APPOINTMENT (OUTPATIENT)
Dept: OPHTHALMOLOGY | Facility: CLINIC | Age: 83
End: 2023-02-09
Payer: MEDICARE

## 2023-02-09 ENCOUNTER — NON-APPOINTMENT (OUTPATIENT)
Age: 83
End: 2023-02-09

## 2023-02-09 PROCEDURE — 92012 INTRM OPH EXAM EST PATIENT: CPT

## 2023-03-01 ENCOUNTER — APPOINTMENT (OUTPATIENT)
Dept: OPHTHALMOLOGY | Facility: CLINIC | Age: 83
End: 2023-03-01

## 2023-04-19 ENCOUNTER — NON-APPOINTMENT (OUTPATIENT)
Age: 83
End: 2023-04-19

## 2023-04-19 ENCOUNTER — APPOINTMENT (OUTPATIENT)
Dept: OPHTHALMOLOGY | Facility: CLINIC | Age: 83
End: 2023-04-19
Payer: MEDICARE

## 2023-04-19 PROCEDURE — 92012 INTRM OPH EXAM EST PATIENT: CPT

## 2023-07-05 ENCOUNTER — NON-APPOINTMENT (OUTPATIENT)
Age: 83
End: 2023-07-05

## 2023-07-05 ENCOUNTER — APPOINTMENT (OUTPATIENT)
Dept: OPHTHALMOLOGY | Facility: CLINIC | Age: 83
End: 2023-07-05
Payer: MEDICARE

## 2023-07-05 PROCEDURE — 92012 INTRM OPH EXAM EST PATIENT: CPT

## 2023-08-16 ENCOUNTER — NON-APPOINTMENT (OUTPATIENT)
Age: 83
End: 2023-08-16

## 2023-08-16 ENCOUNTER — APPOINTMENT (OUTPATIENT)
Dept: OPHTHALMOLOGY | Facility: CLINIC | Age: 83
End: 2023-08-16
Payer: MEDICARE

## 2023-08-16 PROCEDURE — 92014 COMPRE OPH EXAM EST PT 1/>: CPT

## 2023-08-29 ENCOUNTER — APPOINTMENT (OUTPATIENT)
Dept: OPHTHALMOLOGY | Facility: CLINIC | Age: 83
End: 2023-08-29

## 2023-09-22 NOTE — DISCHARGE NOTE NURSING/CASE MANAGEMENT/SOCIAL WORK - NSTOBACCONEVERSMOKERY/N_GEN_A
Attempted to call patient and daughter, no answer LVM.   Patient scheduled to have colonoscopy 10/06, per anesthesia must stop dapagliflozin (Farxiga) 3 days before her colonoscopy. Message sent to clinical pool to inform patient/daughter of this as well.      
No

## 2023-10-18 NOTE — DISCHARGE NOTE PROVIDER - NSDCHOSPICE_GEN_A_CORE
RE: Plan of Care    Maite Tripp DO    Thank you for referring Nolvia Wilde. The following information reflects my assessment and plan of care.           Plan of Care 23   Effective from: 2023  Effective to: 2024    Plan ID: [id number]                Participants as of Finalize on 2023      Name Type Comments Contact Info    Maite Tripp DO PCP - General  [phone]    Rosey Martínez, PT Physical Therapist                 Nolvia Wilde MRN:[id number] (:2022 11 month old F)                 Evaluation       Author: Rosey Martínez PT Status: Signed Last edited: 10/18/2023  8:15 AM         Physical Therapy Progress Note    Referred by: No ref. provider found; Medical Diagnosis (from order):    Visit: Visit count could not be calculated. Make sure you are using a visit which is associated with an episode.    Visit Type: Progress Note    SUBJECTIVE                                                                                                             Present and reporting subjective information: mother  Mom says that Nolvia is crawling forward on her belly and pulling to stand.    OBJECTIVE                                                                                                                      Movement:  Prone:    Lying:     -assist to attain: independent    -assist to maintain: independent     -time in position: age appropriate    -head/trunk control: head to midline   On Elbows:    -assist to attain: independent    -assist to maintain: independent     -time in position: age appropriate    -head/trunk control: head to midline   On Extended Arm:    -assist to attain: independent    -assist to maintain position: independent     -time in position: <60 sec    -head/trunk control: head to midline   Comments: Improved proximal head and shoulder stability noted in prone with progression to reciprocal crawling and sitting to half-sitting. However, patient does  not yet demonstrate a consistent chin tuck in prone.  Supine:    Rolls Supine to Prone:     -assist to complete over left shoulder: independent    -assist to complete over right shoulder: independent    -head/trunk control: head rotation left and head rotation right   Comments: Rolling supine to prone noted with trunk rotation.  Sitting:    Long Sitting:     -assist to attain: independent    -assist to maintain position: independent    -time in position: age appropriate    -head/trunk control: head to midline   Sit to 4 Point Transition:     -assist to complete over left shoulder: modified independent     -assist to complete over right shoulder: modified independent    -head/trunk control: head to midline   Comments: Patient shows hesitancy with movement transitions out of sitting but can transition sitting to 3-point with slow, graded movements.  Standing:    Pull to Stand at Support:     -assist to complete over left side: independent    -assist to complete over right side: independent     -head/trunk control: head to midline     -uses arms: pulls up through arms with legs posterior          TREATMENT                                                                                                                  Therapeutic Activity:    -Re- assessment of STG and LTG for progress note  -Balance facilitation in sitting  -Facilitated prone to quadraped  -Facilitated creeping      Skilled input: verbal instruction/cues, tactile instruction/cues and inhibition    Home Exercise Program:   -Inhibition to UE tightness in supine and sitting  -Facilitation of hand to opposite foot in supine and sitting  -Sitting with arms forward and head in midline  -Balance facilitation in sitting  -Carrying with arms forward       ASSESSMENT                                                                                                                 Nolvia has been seen for 17 inperson PT sessions secondary to her diagnosis of  prematurity and torticollis. Patient has made nice progress in this time with progression of gross motor skills. Midline head control is now noted in all developmental positions 100% of the time. Nolvia is now able to sit without hand support with good balance reaching outside base of support and with emergence of trunk rotation. She is not yet able to transition out of sitting independently to prone, but shows emergence of sitting-> sidesit transition. Improved proximal head and shoulder stability is noted in prone and patient can now assume prone on extended arms, reciprocally creep and can pull to standing independently. Nolvia demonstrates gross motor skills just below the 25th percentile on the [place] Infant Motor Scale when compared to age-matched peers at her adjusted age of 8 months, 22 days. Ongoing, skilled physical therapy is indicated to address the above deficits and to progress gross motor skills for age appropriate mobility in the environment.    To date the patient has made gains as expected as reported.  Education:   - Results of above outlined education: Verbalizes understanding, Demonstrates understanding and Needs reinforcement      PLAN                                                                                                                         The following skilled interventions to be implemented to achieve goals listed below:  Gait Training (06816)  Neuromuscular Re-Education (46244)  Therapeutic Activity (92832)  Therapeutic Exercise (00737)    Updates to plan of care: continue current plan of care, modify plan of care    Frequency / Duration: 1 times per week tapering as patient progresses  for additional 12 weeks          GOALS                                                                                                                           Long Term Goals: to be met by end of plan of care  1.   Patient will demonstrate symmetrical postures in all positions as noted by  head and arms to midline in sitting in 2/4 trials  Goal Attainment Scale (GAS)    -2: 0/4    -1: 1/4      0: per goal written above    +1: 3/4    +2: 4/4        GAS Key        -2=Much less than expected outcome (baseline); -1=Less than expected outcome (progressing);          0=Patient achieves expected outcome after intervention (goal, set at evaluation); +1=Better than          expected outcome; +2=Much better than expected outcome    Importance: 3  Difficulty: 2  Weight: 6    Baseline: -2  Achieved: 2 Change: 4    Status: This goal will continue to next episode:  Patient transitions from floor to 4-point over either side independently in 2/4 trials to progress developmental skills and to increase independent mobility.  Goal Attainment Scale (GAS)    -2:0/4 trials    -1: 1/4 trials      0: per goal written above    +1: 3/4 trials    +2: 4/4 trials            2. Patient demonstrates independent prone on elbows with 90 degrees neck extension and neutral head independently for 2 minutes to progress developmental skills.  Goal Attainment Scale (GAS)    -2: 0 minutes    -1: 1 minute      0: per goal written above    +1: 3 minutes    +2: 4 mintues    Importance: 3  Difficulty: 2  Weight: 6    Baseline: -2  Achieved: -1  Change: 1   Status: This goal will continue to next episode:  Patient increases strength and coordination to be able to 4-point reciprocal creep independently in 2/4 trials with neutral head to progress developmental skills and to increase independent mobility.  Goal Attainment Scale (GAS)    -2: 0/4 trials    -1: 1/4 trials      0: per goal written above    +1: 3/4 trials    +2: 4/4 trials      3. Patient increases core stability and balance to prop sit independently  with supervision for 30 seconds minutes without demonstrating loss of balance to progress developmental skills.  Goal Attainment Scale (GAS)    -2: 0 seconds    -1: 15 seconds      0: per goal written above    +1: 45 seconds    +2: 60  seconds    Importance: 3  Difficulty: 2  Weight: 6    Baseline: -2  Achieved: 2 Change: 4   Status: This goal will continue to next episode:  Patient stands unsupported independently for 30 seconds to progress developmental skills and to increase independent mobility.  Goal Attainment Scale (GAS)    -2: 0 sec    -1: 15 sec      0: per goal written above    +1: 45 sec    +2: 60 sec    GAS T Score Calculations:    Baseline: 22.61  Achieved: 63.69  Change: 41.08  4.   Patient will increase antigravity postural control as noted by independent sitting without arm support for 2 minutes to progress developmental skills    -2: 0 minutes    -1: 1 mintues      0: per goal written above    +1: 3 mintues    +2: 4 mintues        Status: discontinued Achieved      Therapy procedure time and total treatment time can be found documented on the Time Entry flowsheet         Current Participants as of 11/1/2023      Name Type Comments Contact Info    Maite Tripp DO PCP - General  [phone]    Signature pending    Rosey Martínez PT Physical Therapist      Electronically signed by Rosey Martínez PT at 11/1/2023 1413 CDT              Please complete the attached form to indicate your approval of the plan of care upon receipt and fax signed form to the fax number below.  Insurance compliance requires your approval be on file.  Should you have any questions, feel free to contact me.     Rosey Martínez PT  [facility], [place], [facility] by [facility]  [address] [facility] [address]  Phone: [phone]  Fax: [phone]                RE: Plan of Care for Nolvia Wilde, YOB: 2022     I certify the need for these services, furnished under this plan of treatment and while under my care.  I agree with the plan of care as stated and request that therapy proceed.        __________________________________________________________________________________  Provider Signature          Date   Time   No

## 2023-11-16 ENCOUNTER — APPOINTMENT (OUTPATIENT)
Dept: OPHTHALMOLOGY | Facility: CLINIC | Age: 83
End: 2023-11-16
Payer: MEDICARE

## 2023-11-16 ENCOUNTER — NON-APPOINTMENT (OUTPATIENT)
Age: 83
End: 2023-11-16

## 2023-11-16 PROCEDURE — 92012 INTRM OPH EXAM EST PATIENT: CPT

## 2023-11-30 NOTE — PATIENT PROFILE ADULT. - FUNCTIONAL SCREEN CURRENT LEVEL: SWALLOWING (IF SCORE 2 OR MORE FOR ANY ITEM, CONSULT REHAB SERVICES), MLM)
Topical Retinoid Pregnancy And Lactation Text: This medication is Pregnancy Category C. It is unknown if this medication is excreted in breast milk. Calcipotriene Pregnancy And Lactation Text: The use of this medication during pregnancy or lactation is not recommended as there is insufficient data. Minocycline Counseling: Patient advised regarding possible photosensitivity and discoloration of the teeth, skin, lips, tongue and gums.  Patient instructed to avoid sunlight, if possible.  When exposed to sunlight, patients should wear protective clothing, sunglasses, and sunscreen.  The patient was instructed to call the office immediately if the following severe adverse effects occur:  hearing changes, easy bruising/bleeding, severe headache, or vision changes.  The patient verbalized understanding of the proper use and possible adverse effects of minocycline.  All of the patient's questions and concerns were addressed. Simponi Pregnancy And Lactation Text: The risk during pregnancy and breastfeeding is uncertain with this medication. Glycopyrrolate Pregnancy And Lactation Text: This medication is Pregnancy Category B and is considered safe during pregnancy. It is unknown if it is excreted breast milk. Clofazimine Counseling:  I discussed with the patient the risks of clofazimine including but not limited to skin and eye pigmentation, liver damage, nausea/vomiting, gastrointestinal bleeding and allergy. Mirvaso Pregnancy And Lactation Text: This medication has not been assigned a Pregnancy Risk Category. It is unknown if the medication is excreted in breast milk. Thalidomide Counseling: I discussed with the patient the risks of thalidomide including but not limited to birth defects, anxiety, weakness, chest pain, dizziness, cough and severe allergy. Ilumya Counseling: I discussed with the patient the risks of tildrakizumab including but not limited to immunosuppression, malignancy, posterior leukoencephalopathy syndrome, and serious infections.  The patient understands that monitoring is required including a PPD at baseline and must alert us or the primary physician if symptoms of infection or other concerning signs are noted. Hydroquinone Counseling:  Patient advised that medication may result in skin irritation, lightening (hypopigmentation), dryness, and burning.  In the event of skin irritation, the patient was advised to reduce the amount of the drug applied or use it less frequently.  Rarely, spots that are treated with hydroquinone can become darker (pseudoochronosis).  Should this occur, patient instructed to stop medication and call the office. The patient verbalized understanding of the proper use and possible adverse effects of hydroquinone.  All of the patient's questions and concerns were addressed. Cephalexin Pregnancy And Lactation Text: This medication is Pregnancy Category B and considered safe during pregnancy.  It is also excreted in breast milk but can be used safely for shorter doses. Zoryve Counseling:  I discussed with the patient that Zoryve is not for use in the eyes, mouth or vagina. The most commonly reported side effects include diarrhea, headache, insomnia, application site pain, upper respiratory tract infections, and urinary tract infections.  All of the patient's questions and concerns were addressed. Detail Level: Simple Cyclophosphamide Counseling:  I discussed with the patient the risks of cyclophosphamide including but not limited to hair loss, hormonal abnormalities, decreased fertility, abdominal pain, diarrhea, nausea and vomiting, bone marrow suppression and infection. The patient understands that monitoring is required while taking this medication. Cantharidin Counseling:  I discussed with the patient the risks of Cantharidin including but not limited to pain, redness, burning, itching, and blistering. Cimzia Pregnancy And Lactation Text: This medication crosses the placenta but can be considered safe in certain situations. Cimzia may be excreted in breast milk. Cellcept Counseling:  I discussed with the patient the risks of mycophenolate mofetil including but not limited to infection/immunosuppression, GI upset, hypokalemia, hypercholesterolemia, bone marrow suppression, lymphoproliferative disorders, malignancy, GI ulceration/bleed/perforation, colitis, interstitial lung disease, kidney failure, progressive multifocal leukoencephalopathy, and birth defects.  The patient understands that monitoring is required including a baseline creatinine and regular CBC testing. In addition, patient must alert us immediately if symptoms of infection or other concerning signs are noted. Dutasteride Pregnancy And Lactation Text: This medication is absolutely contraindicated in women, especially during pregnancy and breast feeding. Feminization of male fetuses is possible if taking while pregnant. Aklief counseling:  Patient advised to apply a pea-sized amount only at bedtime and wait 30 minutes after washing their face before applying.  If too drying, patient may add a non-comedogenic moisturizer.  The most commonly reported side effects including irritation, redness, scaling, dryness, stinging, burning, itching, and increased risk of sunburn.  The patient verbalized understanding of the proper use and possible adverse effects of retinoids.  All of the patient's questions and concerns were addressed. Hydroxyzine Pregnancy And Lactation Text: This medication is not safe during pregnancy and should not be taken. It is also excreted in breast milk and breast feeding isn't recommended. Topical Steroids Applications Pregnancy And Lactation Text: Most topical steroids are considered safe to use during pregnancy and lactation.  Any topical steroid applied to the breast or nipple should be washed off before breastfeeding. Olanzapine Pregnancy And Lactation Text: This medication is pregnancy category C.   There are no adequate and well controlled trials with olanzapine in pregnant females.  Olanzapine should be used during pregnancy only if the potential benefit justifies the potential risk to the fetus.   In a study in lactating healthy women, olanzapine was excreted in breast milk.  It is recommended that women taking olanzapine should not breast feed. Cibinqo Pregnancy And Lactation Text: It is unknown if this medication will adversely affect pregnancy or breast feeding.  You should not take this medication if you are currently pregnant or planning a pregnancy or while breastfeeding. Xolair Pregnancy And Lactation Text: This medication is Pregnancy Category B and is considered safe during pregnancy. This medication is excreted in breast milk. Acitretin Counseling:  I discussed with the patient the risks of acitretin including but not limited to hair loss, dry lips/skin/eyes, liver damage, hyperlipidemia, depression/suicidal ideation, photosensitivity.  Serious rare side effects can include but are not limited to pancreatitis, pseudotumor cerebri, bony changes, clot formation/stroke/heart attack.  Patient understands that alcohol is contraindicated since it can result in liver toxicity and significantly prolong the elimination of the drug by many years. Skyrizi Counseling: I discussed with the patient the risks of risankizumab-rzaa including but not limited to immunosuppression, and serious infections.  The patient understands that monitoring is required including a PPD at baseline and must alert us or the primary physician if symptoms of infection or other concerning signs are noted. Tazorac Counseling:  Patient advised that medication is irritating and drying.  Patient may need to apply sparingly and wash off after an hour before eventually leaving it on overnight.  The patient verbalized understanding of the proper use and possible adverse effects of tazorac.  All of the patient's questions and concerns were addressed. Cantharidin Pregnancy And Lactation Text: This medication has not been proven safe during pregnancy. It is unknown if this medication is excreted in breast milk. Minocycline Pregnancy And Lactation Text: This medication is Pregnancy Category D and not consider safe during pregnancy. It is also excreted in breast milk. Hydroxychloroquine Counseling:  I discussed with the patient that a baseline ophthalmologic exam is needed at the start of therapy and every year thereafter while on therapy. A CBC may also be warranted for monitoring.  The side effects of this medication were discussed with the patient, including but not limited to agranulocytosis, aplastic anemia, seizures, rashes, retinopathy, and liver toxicity. Patient instructed to call the office should any adverse effect occur.  The patient verbalized understanding of the proper use and possible adverse effects of Plaquenil.  All the patient's questions and concerns were addressed. Opzelura Counseling:  I discussed with the patient the risks of Opzelura including but not limited to nasopharngitis, bronchitis, ear infection, eosinophila, hives, diarrhea, folliculitis, tonsillitis, and rhinorrhea.  Taken orally, this medication has been linked to serious infections; higher rate of mortality; malignancy and lymphoproliferative disorders; major adverse cardiovascular events; thrombosis; thrombocytopenia, anemia, and neutropenia; and lipid elevations. Qbrexza Pregnancy And Lactation Text: There is no available data on Qbrexza use in pregnant women.  There is no available data on Qbrexza use in lactation. Thalidomide Pregnancy And Lactation Text: This medication is Pregnancy Category X and is absolutely contraindicated during pregnancy. It is unknown if it is excreted in breast milk. Hydroquinone Pregnancy And Lactation Text: This medication has not been assigned a Pregnancy Risk Category but animal studies failed to show danger with the topical medication. It is unknown if the medication is excreted in breast milk. Clofazimine Pregnancy And Lactation Text: This medication is Pregnancy Category C and isn't considered safe during pregnancy. It is excreted in breast milk. Fluconazole Counseling:  Patient counseled regarding adverse effects of fluconazole including but not limited to headache, diarrhea, nausea, upset stomach, liver function test abnormalities, taste disturbance, and stomach pain.  There is a rare possibility of liver failure that can occur when taking fluconazole.  The patient understands that monitoring of LFTs and kidney function test may be required, especially at baseline. The patient verbalized understanding of the proper use and possible adverse effects of fluconazole.  All of the patient's questions and concerns were addressed. Clindamycin Counseling: I counseled the patient regarding use of clindamycin as an antibiotic for prophylactic and/or therapeutic purposes. Clindamycin is active against numerous classes of bacteria, including skin bacteria. Side effects may include nausea, diarrhea, gastrointestinal upset, rash, hives, yeast infections, and in rare cases, colitis. 5-Fu Counseling: 5-Fluorouracil Counseling:  I discussed with the patient the risks of 5-fluorouracil including but not limited to erythema, scaling, itching, weeping, crusting, and pain. Cyclophosphamide Pregnancy And Lactation Text: This medication is Pregnancy Category D and it isn't considered safe during pregnancy. This medication is excreted in breast milk. Finasteride Male Counseling: Finasteride Counseling:  I discussed with the patient the risks of use of finasteride including but not limited to decreased libido, decreased ejaculate volume, gynecomastia, and depression. Women should not handle medication.  All of the patient's questions and concerns were addressed. Cosentyx Counseling:  I discussed with the patient the risks of Cosentyx including but not limited to worsening of Crohn's disease, immunosuppression, allergic reactions and infections.  The patient understands that monitoring is required including a PPD at baseline and must alert us or the primary physician if symptoms of infection or other concerning signs are noted. Include Pregnancy/Lactation Warning?: No Aklief Pregnancy And Lactation Text: It is unknown if this medication is safe to use during pregnancy.  It is unknown if this medication is excreted in breast milk.  Breastfeeding women should use the topical cream on the smallest area of the skin for the shortest time needed while breastfeeding.  Do not apply to nipple and areola. Zoryve Pregnancy And Lactation Text: It is unknown if this medication can cause problems during pregnancy and breastfeeding. Olumiant Counseling: I discussed with the patient the risks of Olumiant therapy including but not limited to upper respiratory tract infections, shingles, cold sores, and nausea. Live vaccines should be avoided.  This medication has been linked to serious infections; higher rate of mortality; malignancy and lymphoproliferative disorders; major adverse cardiovascular events; thrombosis; gastrointestinal perforations; neutropenia; lymphopenia; anemia; liver enzyme elevations; and lipid elevations. Topical Sulfur Applications Counseling: Topical Sulfur Counseling: Patient counseled that this medication may cause skin irritation or allergic reactions.  In the event of skin irritation, the patient was advised to reduce the amount of the drug applied or use it less frequently.   The patient verbalized understanding of the proper use and possible adverse effects of topical sulfur application.  All of the patient's questions and concerns were addressed. Oral Minoxidil Counseling- I discussed with the patient the risks of oral minoxidil including but not limited to shortness of breath, swelling of the feet or ankles, dizziness, lightheadedness, unwanted hair growth and allergic reaction.  The patient verbalized understanding of the proper use and possible adverse effects of oral minoxidil.  All of the patient's questions and concerns were addressed. Hydroxychloroquine Pregnancy And Lactation Text: This medication has been shown to cause fetal harm but it isn't assigned a Pregnancy Risk Category. There are small amounts excreted in breast milk. Tazorac Pregnancy And Lactation Text: This medication is not safe during pregnancy. It is unknown if this medication is excreted in breast milk. Opzelura Pregnancy And Lactation Text: There is insufficient data to evaluate drug-associated risk for major birth defects, miscarriage, or other adverse maternal or fetal outcomes.  There is a pregnancy registry that monitors pregnancy outcomes in pregnant persons exposed to the medication during pregnancy.  It is unknown if this medication is excreted in breast milk.  Do not breastfeed during treatment and for about 4 weeks after the last dose. Quinolones Counseling:  I discussed with the patient the risks of fluoroquinolones including but not limited to GI upset, allergic reaction, drug rash, diarrhea, dizziness, photosensitivity, yeast infections, liver function test abnormalities, tendonitis/tendon rupture. Colchicine Counseling:  Patient counseled regarding adverse effects including but not limited to stomach upset (nausea, vomiting, stomach pain, or diarrhea).  Patient instructed to limit alcohol consumption while taking this medication.  Colchicine may reduce blood counts especially with prolonged use.  The patient understands that monitoring of kidney function and blood counts may be required, especially at baseline. The patient verbalized understanding of the proper use and possible adverse effects of colchicine.  All of the patient's questions and concerns were addressed. Acitretin Pregnancy And Lactation Text: This medication is Pregnancy Category X and should not be given to women who are pregnant or may become pregnant in the future. This medication is excreted in breast milk. Rhofade Counseling: Rhofade is a topical medication which can decrease superficial blood flow where applied. Side effects are uncommon and include stinging, redness and allergic reactions. Tranexamic Acid Counseling:  Patient advised of the small risk of bleeding problems with tranexamic acid. They were also instructed to call if they developed any nausea, vomiting or diarrhea. All of the patient's questions and concerns were addressed. Albendazole Counseling:  I discussed with the patient the risks of albendazole including but not limited to cytopenia, kidney damage, nausea/vomiting and severe allergy.  The patient understands that this medication is being used in an off-label manner. Erivedge Counseling- I discussed with the patient the risks of Erivedge including but not limited to nausea, vomiting, diarrhea, constipation, weight loss, changes in the sense of taste, decreased appetite, muscle spasms, and hair loss.  The patient verbalized understanding of the proper use and possible adverse effects of Erivedge.  All of the patient's questions and concerns were addressed. Imiquimod Counseling:  I discussed with the patient the risks of imiquimod including but not limited to erythema, scaling, itching, weeping, crusting, and pain.  Patient understands that the inflammatory response to imiquimod is variable from person to person and was educated regarded proper titration schedule.  If flu-like symptoms develop, patient knows to discontinue the medication and contact us. Fluconazole Pregnancy And Lactation Text: This medication is Pregnancy Category C and it isn't know if it is safe during pregnancy. It is also excreted in breast milk. Infliximab Counseling:  I discussed with the patient the risks of infliximab including but not limited to myelosuppression, immunosuppression, autoimmune hepatitis, demyelinating diseases, lymphoma, and serious infections.  The patient understands that monitoring is required including a PPD at baseline and must alert us or the primary physician if symptoms of infection or other concerning signs are noted. Cyclosporine Counseling:  I discussed with the patient the risks of cyclosporine including but not limited to hypertension, gingival hyperplasia,myelosuppression, immunosuppression, liver damage, kidney damage, neurotoxicity, lymphoma, and serious infections. The patient understands that monitoring is required including baseline blood pressure, CBC, CMP, lipid panel and uric acid, and then 1-2 times monthly CMP and blood pressure. Clindamycin Pregnancy And Lactation Text: This medication can be used in pregnancy if certain situations. Clindamycin is also present in breast milk. Cosentyx Pregnancy And Lactation Text: This medication is Pregnancy Category B and is considered safe during pregnancy. It is unknown if this medication is excreted in breast milk. Zyclara Counseling:  I discussed with the patient the risks of imiquimod including but not limited to erythema, scaling, itching, weeping, crusting, and pain.  Patient understands that the inflammatory response to imiquimod is variable from person to person and was educated regarded proper titration schedule.  If flu-like symptoms develop, patient knows to discontinue the medication and contact us. Finasteride Pregnancy And Lactation Text: This medication is absolutely contraindicated during pregnancy. It is unknown if it is excreted in breast milk. Terbinafine Counseling: Patient counseling regarding adverse effects of terbinafine including but not limited to headache, diarrhea, rash, upset stomach, liver function test abnormalities, itching, taste/smell disturbance, nausea, abdominal pain, and flatulence.  There is a rare possibility of liver failure that can occur when taking terbinafine.  The patient understands that a baseline LFT and kidney function test may be required. The patient verbalized understanding of the proper use and possible adverse effects of terbinafine.  All of the patient's questions and concerns were addressed. 5-Fu Pregnancy And Lactation Text: This medication is Pregnancy Category X and contraindicated in pregnancy and in women who may become pregnant. It is unknown if this medication is excreted in breast milk. Topical Sulfur Applications Pregnancy And Lactation Text: This medication is Pregnancy Category C and has an unknown safety profile during pregnancy. It is unknown if this topical medication is excreted in breast milk. Olumiant Pregnancy And Lactation Text: Based on animal studies, Olumiant may cause embryo-fetal harm when administered to pregnant women.  The medication should not be used in pregnancy.  Breastfeeding is not recommended during treatment. Stelara Counseling:  I discussed with the patient the risks of ustekinumab including but not limited to immunosuppression, malignancy, posterior leukoencephalopathy syndrome, and serious infections.  The patient understands that monitoring is required including a PPD at baseline and must alert us or the primary physician if symptoms of infection or other concerning signs are noted. Azelaic Acid Counseling: Patient counseled that medicine may cause skin irritation and to avoid applying near the eyes.  In the event of skin irritation, the patient was advised to reduce the amount of the drug applied or use it less frequently.   The patient verbalized understanding of the proper use and possible adverse effects of azelaic acid.  All of the patient's questions and concerns were addressed. Oral Minoxidil Pregnancy And Lactation Text: This medication should only be used when clearly needed if you are pregnant, attempting to become pregnant or breast feeding. (0) swallows foods/liquids without difficulty Topical Clindamycin Counseling: Patient counseled that this medication may cause skin irritation or allergic reactions.  In the event of skin irritation, the patient was advised to reduce the amount of the drug applied or use it less frequently.   The patient verbalized understanding of the proper use and possible adverse effects of clindamycin.  All of the patient's questions and concerns were addressed. Picato Counseling:  I discussed with the patient the risks of Picato including but not limited to erythema, scaling, itching, weeping, crusting, and pain. Bexarotene Counseling:  I discussed with the patient the risks of bexarotene including but not limited to hair loss, dry lips/skin/eyes, liver abnormalities, hyperlipidemia, pancreatitis, depression/suicidal ideation, photosensitivity, drug rash/allergic reactions, hypothyroidism, anemia, leukopenia, infection, cataracts, and teratogenicity.  Patient understands that they will need regular blood tests to check lipid profile, liver function tests, white blood cell count, thyroid function tests and pregnancy test if applicable. Low Dose Naltrexone Counseling- I discussed with the patient the potential risks and side effects of low dose naltrexone including but not limited to: more vivid dreams, headaches, nausea, vomiting, abdominal pain, fatigue, dizziness, and anxiety. Albendazole Pregnancy And Lactation Text: This medication is Pregnancy Category C and it isn't known if it is safe during pregnancy. It is also excreted in breast milk. Tranexamic Acid Pregnancy And Lactation Text: It is unknown if this medication is safe during pregnancy or breast feeding. Griseofulvin Counseling:  I discussed with the patient the risks of griseofulvin including but not limited to photosensitivity, cytopenia, liver damage, nausea/vomiting and severe allergy.  The patient understands that this medication is best absorbed when taken with a fatty meal (e.g., ice cream or french fries). Doxycycline Counseling:  Patient counseled regarding possible photosensitivity and increased risk for sunburn.  Patient instructed to avoid sunlight, if possible.  When exposed to sunlight, patients should wear protective clothing, sunglasses, and sunscreen.  The patient was instructed to call the office immediately if the following severe adverse effects occur:  hearing changes, easy bruising/bleeding, severe headache, or vision changes.  The patient verbalized understanding of the proper use and possible adverse effects of doxycycline.  All of the patient's questions and concerns were addressed. Cyclosporine Pregnancy And Lactation Text: This medication is Pregnancy Category C and it isn't know if it is safe during pregnancy. This medication is excreted in breast milk. Dupixent Counseling: I discussed with the patient the risks of dupilumab including but not limited to eye infection and irritation, cold sores, injection site reactions, worsening of asthma, allergic reactions and increased risk of parasitic infection.  Live vaccines should be avoided while taking dupilumab. Dupilumab will also interact with certain medications such as warfarin and cyclosporine. The patient understands that monitoring is required and they must alert us or the primary physician if symptoms of infection or other concerning signs are noted. Terbinafine Pregnancy And Lactation Text: This medication is Pregnancy Category B and is considered safe during pregnancy. It is also excreted in breast milk and breast feeding isn't recommended. Birth Control Pills Counseling: Birth Control Pill Counseling: I discussed with the patient the potential side effects of OCPs including but not limited to increased risk of stroke, heart attack, thrombophlebitis, deep venous thrombosis, hepatic adenomas, breast changes, GI upset, headaches, and depression.  The patient verbalized understanding of the proper use and possible adverse effects of OCPs. All of the patient's questions and concerns were addressed. Wartpeel Counseling:  I discussed with the patient the risks of Wartpeel including but not limited to erythema, scaling, itching, weeping, crusting, and pain. Otezla Counseling: The side effects of Otezla were discussed with the patient, including but not limited to worsening or new depression, weight loss, diarrhea, nausea, upper respiratory tract infection, and headache. Patient instructed to call the office should any adverse effect occur.  The patient verbalized understanding of the proper use and possible adverse effects of Otezla.  All the patient's questions and concerns were addressed. Drysol Counseling:  I discussed with the patient the risks of drysol/aluminum chloride including but not limited to skin rash, itching, irritation, burning. Rinvoq Counseling: I discussed with the patient the risks of Rinvoq therapy including but not limited to upper respiratory tract infections, shingles, cold sores, bronchitis, nausea, cough, fever, acne, and headache. Live vaccines should be avoided.  This medication has been linked to serious infections; higher rate of mortality; malignancy and lymphoproliferative disorders; major adverse cardiovascular events; thrombosis; thrombocytopenia, anemia, and neutropenia; lipid elevations; liver enzyme elevations; and gastrointestinal perforations. Azelaic Acid Pregnancy And Lactation Text: This medication is considered safe during pregnancy and breast feeding. Bexarotene Pregnancy And Lactation Text: This medication is Pregnancy Category X and should not be given to women who are pregnant or may become pregnant. This medication should not be used if you are breast feeding. Libtayo Counseling- I discussed with the patient the risks of Libtayo including but not limited to nausea, vomiting, diarrhea, and bone or muscle pain.  The patient verbalized understanding of the proper use and possible adverse effects of Libtayo.  All of the patient's questions and concerns were addressed. Topical Clindamycin Pregnancy And Lactation Text: This medication is Pregnancy Category B and is considered safe during pregnancy. It is unknown if it is excreted in breast milk. Valtrex Counseling: I discussed with the patient the risks of valacyclovir including but not limited to kidney damage, nausea, vomiting and severe allergy.  The patient understands that if the infection seems to be worsening or is not improving, they are to call. Rifampin Counseling: I discussed with the patient the risks of rifampin including but not limited to liver damage, kidney damage, red-orange body fluids, nausea/vomiting and severe allergy. Low Dose Naltrexone Pregnancy And Lactation Text: Naltrexone is pregnancy category C.  There have been no adequate and well-controlled studies in pregnant women.  It should be used in pregnancy only if the potential benefit justifies the potential risk to the fetus.   Limited data indicates that naltrexone is minimally excreted into breastmilk. Dapsone Counseling: I discussed with the patient the risks of dapsone including but not limited to hemolytic anemia, agranulocytosis, rashes, methemoglobinemia, kidney failure, peripheral neuropathy, headaches, GI upset, and liver toxicity.  Patients who start dapsone require monitoring including baseline LFTs and weekly CBCs for the first month, then every month thereafter.  The patient verbalized understanding of the proper use and possible adverse effects of dapsone.  All of the patient's questions and concerns were addressed. Solaraze Counseling:  I discussed with the patient the risks of Solaraze including but not limited to erythema, scaling, itching, weeping, crusting, and pain. Rituxan Counseling:  I discussed with the patient the risks of Rituxan infusions. Side effects can include infusion reactions, severe drug rashes including mucocutaneous reactions, reactivation of latent hepatitis and other infections and rarely progressive multifocal leukoencephalopathy.  All of the patient's questions and concerns were addressed. Birth Control Pills Pregnancy And Lactation Text: This medication should be avoided if pregnant and for the first 30 days post-partum. Doxycycline Pregnancy And Lactation Text: This medication is Pregnancy Category D and not consider safe during pregnancy. It is also excreted in breast milk but is considered safe for shorter treatment courses. Dupixent Pregnancy And Lactation Text: This medication likely crosses the placenta but the risk for the fetus is uncertain. This medication is excreted in breast milk. Klisyri Counseling:  I discussed with the patient the risks of Klisyri including but not limited to erythema, scaling, itching, weeping, crusting, and pain. Ivermectin Counseling:  Patient instructed to take medication on an empty stomach with a full glass of water.  Patient informed of potential adverse effects including but not limited to nausea, diarrhea, dizziness, itching, and swelling of the extremities or lymph nodes.  The patient verbalized understanding of the proper use and possible adverse effects of ivermectin.  All of the patient's questions and concerns were addressed. Griseofulvin Pregnancy And Lactation Text: This medication is Pregnancy Category X and is known to cause serious birth defects. It is unknown if this medication is excreted in breast milk but breast feeding should be avoided. Methotrexate Counseling:  Patient counseled regarding adverse effects of methotrexate including but not limited to nausea, vomiting, abnormalities in liver function tests. Patients may develop mouth sores, rash, diarrhea, and abnormalities in blood counts. The patient understands that monitoring is required including LFT's and blood counts.  There is a rare possibility of scarring of the liver and lung problems that can occur when taking methotrexate. Persistent nausea, loss of appetite, pale stools, dark urine, cough, and shortness of breath should be reported immediately. Patient advised to discontinue methotrexate treatment at least three months before attempting to become pregnant.  I discussed the need for folate supplements while taking methotrexate.  These supplements can decrease side effects during methotrexate treatment. The patient verbalized understanding of the proper use and possible adverse effects of methotrexate.  All of the patient's questions and concerns were addressed. Rinvoq Pregnancy And Lactation Text: Based on animal studies, Rinvoq may cause embryo-fetal harm when administered to pregnant women.  The medication should not be used in pregnancy.  Breastfeeding is not recommended during treatment and for 6 days after the last dose. Otezla Pregnancy And Lactation Text: This medication is Pregnancy Category C and it isn't known if it is safe during pregnancy. It is unknown if it is excreted in breast milk. Azithromycin Counseling:  I discussed with the patient the risks of azithromycin including but not limited to GI upset, allergic reaction, drug rash, diarrhea, and yeast infections. Benzoyl Peroxide Counseling: Patient counseled that medicine may cause skin irritation and bleach clothing.  In the event of skin irritation, the patient was advised to reduce the amount of the drug applied or use it less frequently.   The patient verbalized understanding of the proper use and possible adverse effects of benzoyl peroxide.  All of the patient's questions and concerns were addressed. Isotretinoin Counseling: Patient should get monthly blood tests, not donate blood, not drive at night if vision affected, not share medication, and not undergo elective surgery for 6 months after tx completed. Side effects reviewed, pt to contact office should one occur. Taltz Counseling: I discussed with the patient the risks of ixekizumab including but not limited to immunosuppression, serious infections, worsening of inflammatory bowel disease and drug reactions.  The patient understands that monitoring is required including a PPD at baseline and must alert us or the primary physician if symptoms of infection or other concerning signs are noted. Cimetidine Counseling:  I discussed with the patient the risks of Cimetidine including but not limited to gynecomastia, headache, diarrhea, nausea, drowsiness, arrhythmias, pancreatitis, skin rashes, psychosis, bone marrow suppression and kidney toxicity. Opioid Counseling: I discussed with the patient the potential side effects of opioids including but not limited to addiction, altered mental status, and depression. I stressed avoiding alcohol, benzodiazepines, muscle relaxants and sleep aids unless specifically okayed by a physician. The patient verbalized understanding of the proper use and possible adverse effects of opioids. All of the patient's questions and concerns were addressed. They were instructed to flush the remaining pills down the toilet if they did not need them for pain. Rifampin Pregnancy And Lactation Text: This medication is Pregnancy Category C and it isn't know if it is safe during pregnancy. It is also excreted in breast milk and should not be used if you are breast feeding. Topical Ketoconazole Counseling: Patient counseled that this medication may cause skin irritation or allergic reactions.  In the event of skin irritation, the patient was advised to reduce the amount of the drug applied or use it less frequently.   The patient verbalized understanding of the proper use and possible adverse effects of ketoconazole.  All of the patient's questions and concerns were addressed. Protopic Counseling: Patient may experience a mild burning sensation during topical application. Protopic is not approved in children less than 2 years of age. There have been case reports of hematologic and skin malignancies in patients using topical calcineurin inhibitors although causality is questionable. Niacinamide Counseling: I recommended taking niacin or niacinamide, also know as vitamin B3, twice daily. Recent evidence suggests that taking vitamin B3 (500 mg twice daily) can reduce the risk of actinic keratoses and non-melanoma skin cancers. Side effects of vitamin B3 include flushing and headache. Solaraze Pregnancy And Lactation Text: This medication is Pregnancy Category B and is considered safe. There is some data to suggest avoiding during the third trimester. It is unknown if this medication is excreted in breast milk. Libtayo Pregnancy And Lactation Text: This medication is contraindicated in pregnancy and when breast feeding. Valtrex Pregnancy And Lactation Text: this medication is Pregnancy Category B and is considered safe during pregnancy. This medication is not directly found in breast milk but it's metabolite acyclovir is present. Erythromycin Counseling:  I discussed with the patient the risks of erythromycin including but not limited to GI upset, allergic reaction, drug rash, diarrhea, increase in liver enzymes, and yeast infections. Rituxan Pregnancy And Lactation Text: This medication is Pregnancy Category C and it isn't know if it is safe during pregnancy. It is unknown if this medication is excreted in breast milk but similar antibodies are known to be excreted. Dapsone Pregnancy And Lactation Text: This medication is Pregnancy Category C and is not considered safe during pregnancy or breast feeding. Klisyri Pregnancy And Lactation Text: It is unknown if this medication can harm a developing fetus or if it is excreted in breast milk. Itraconazole Counseling:  I discussed with the patient the risks of itraconazole including but not limited to liver damage, nausea/vomiting, neuropathy, and severe allergy.  The patient understands that this medication is best absorbed when taken with acidic beverages such as non-diet cola or ginger ale.  The patient understands that monitoring is required including baseline LFTs and repeat LFTs at intervals.  The patient understands that they are to contact us or the primary physician if concerning signs are noted. Spironolactone Counseling: Patient advised regarding risks of diarrhea, abdominal pain, hyperkalemia, birth defects (for female patients), liver toxicity and renal toxicity. The patient may need blood work to monitor liver and kidney function and potassium levels while on therapy. The patient verbalized understanding of the proper use and possible adverse effects of spironolactone.  All of the patient's questions and concerns were addressed. Elidel Counseling: Patient may experience a mild burning sensation during topical application. Elidel is not approved in children less than 2 years of age. There have been case reports of hematologic and skin malignancies in patients using topical calcineurin inhibitors although causality is questionable. Methotrexate Pregnancy And Lactation Text: This medication is Pregnancy Category X and is known to cause fetal harm. This medication is excreted in breast milk. Enbrel Counseling:  I discussed with the patient the risks of etanercept including but not limited to myelosuppression, immunosuppression, autoimmune hepatitis, demyelinating diseases, lymphoma, and infections.  The patient understands that monitoring is required including a PPD at baseline and must alert us or the primary physician if symptoms of infection or other concerning signs are noted. Azithromycin Pregnancy And Lactation Text: This medication is considered safe during pregnancy and is also secreted in breast milk. Benzoyl Peroxide Pregnancy And Lactation Text: This medication is Pregnancy Category C. It is unknown if benzoyl peroxide is excreted in breast milk. Sotyktu Counseling:  I discussed the most common side effects of Sotyktu including: common cold, sore throat, sinus infections, cold sores, canker sores, folliculitis, and acne.  I also discussed more serious side effects of Sotyktu including but not limited to: serious allergic reactions; increased risk for infections such as TB; cancers such as lymphomas; rhabdomyolysis and elevated CPK; and elevated triglycerides and liver enzymes.  Opioid Pregnancy And Lactation Text: These medications can lead to premature delivery and should be avoided during pregnancy. These medications are also present in breast milk in small amounts. Oxybutynin Counseling:  I discussed with the patient the risks of oxybutynin including but not limited to skin rash, drowsiness, dry mouth, difficulty urinating, and blurred vision. Isotretinoin Pregnancy And Lactation Text: This medication is Pregnancy Category X and is considered extremely dangerous during pregnancy. It is unknown if it is excreted in breast milk. Winlevi Counseling:  I discussed with the patient the risks of topical clascoterone including but not limited to erythema, scaling, itching, and stinging. Patient voiced their understanding. Niacinamide Pregnancy And Lactation Text: These medications are considered safe during pregnancy. Siliq Counseling:  I discussed with the patient the risks of Siliq including but not limited to new or worsening depression, suicidal thoughts and behavior, immunosuppression, malignancy, posterior leukoencephalopathy syndrome, and serious infections.  The patient understands that monitoring is required including a PPD at baseline and must alert us or the primary physician if symptoms of infection or other concerning signs are noted. There is also a special program designed to monitor depression which is required with Siliq. Protopic Pregnancy And Lactation Text: This medication is Pregnancy Category C. It is unknown if this medication is excreted in breast milk when applied topically. Sarecycline Counseling: Patient advised regarding possible photosensitivity and discoloration of the teeth, skin, lips, tongue and gums.  Patient instructed to avoid sunlight, if possible.  When exposed to sunlight, patients should wear protective clothing, sunglasses, and sunscreen.  The patient was instructed to call the office immediately if the following severe adverse effects occur:  hearing changes, easy bruising/bleeding, severe headache, or vision changes.  The patient verbalized understanding of the proper use and possible adverse effects of sarecycline.  All of the patient's questions and concerns were addressed. Gabapentin Counseling: I discussed with the patient the risks of gabapentin including but not limited to dizziness, somnolence, fatigue and ataxia. Soolantra Counseling: I discussed with the patients the risks of topial Soolantra. This is a medicine which decreases the number of mites and inflammation in the skin. You experience burning, stinging, eye irritation or allergic reactions.  Please call our office if you develop any problems from using this medication. Erythromycin Pregnancy And Lactation Text: This medication is Pregnancy Category B and is considered safe during pregnancy. It is also excreted in breast milk. Odomzo Counseling- I discussed with the patient the risks of Odomzo including but not limited to nausea, vomiting, diarrhea, constipation, weight loss, changes in the sense of taste, decreased appetite, muscle spasms, and hair loss.  The patient verbalized understanding of the proper use and possible adverse effects of Odomzo.  All of the patient's questions and concerns were addressed. Minoxidil Counseling: Minoxidil is a topical medication which can increase blood flow where it is applied. It is uncertain how this medication increases hair growth. Side effects are uncommon and include stinging and allergic reactions. Prednisone Counseling:  I discussed with the patient the risks of prolonged use of prednisone including but not limited to weight gain, insomnia, osteoporosis, mood changes, diabetes, susceptibility to infection, glaucoma and high blood pressure.  In cases where prednisone use is prolonged, patients should be monitored with blood pressure checks, serum glucose levels and an eye exam.  Additionally, the patient may need to be placed on GI prophylaxis, PCP prophylaxis, and calcium and vitamin D supplementation and/or a bisphosphonate.  The patient verbalized understanding of the proper use and the possible adverse effects of prednisone.  All of the patient's questions and concerns were addressed. Spironolactone Pregnancy And Lactation Text: This medication can cause feminization of the male fetus and should be avoided during pregnancy. The active metabolite is also found in breast milk. Bactrim Counseling:  I discussed with the patient the risks of sulfa antibiotics including but not limited to GI upset, allergic reaction, drug rash, diarrhea, dizziness, photosensitivity, and yeast infections.  Rarely, more serious reactions can occur including but not limited to aplastic anemia, agranulocytosis, methemoglobinemia, blood dyscrasias, liver or kidney failure, lung infiltrates or desquamative/blistering drug rashes. Carac Counseling:  I discussed with the patient the risks of Carac including but not limited to erythema, scaling, itching, weeping, crusting, and pain. Adbry Counseling: I discussed with the patient the risks of tralokinumab including but not limited to eye infection and irritation, cold sores, injection site reactions, worsening of asthma, allergic reactions and increased risk of parasitic infection.  Live vaccines should be avoided while taking tralokinumab. The patient understands that monitoring is required and they must alert us or the primary physician if symptoms of infection or other concerning signs are noted. Sotyktu Pregnancy And Lactation Text: There is insufficient data to evaluate whether or not Sotyktu is safe to use during pregnancy.   It is not known if Sotyktu passes into breast milk and whether or not it is safe to use when breastfeeding.   Winlevi Pregnancy And Lactation Text: This medication is considered safe during pregnancy and breastfeeding. Doxepin Counseling:  Patient advised that the medication is sedating and not to drive a car after taking this medication. Patient informed of potential adverse effects including but not limited to dry mouth, urinary retention, and blurry vision.  The patient verbalized understanding of the proper use and possible adverse effects of doxepin.  All of the patient's questions and concerns were addressed. Topical Metronidazole Counseling: Metronidazole is a topical antibiotic medication. You may experience burning, stinging, redness, or allergic reactions.  Please call our office if you develop any problems from using this medication. Tremfya Counseling: I discussed with the patient the risks of guselkumab including but not limited to immunosuppression, serious infections, and drug reactions.  The patient understands that monitoring is required including a PPD at baseline and must alert us or the primary physician if symptoms of infection or other concerning signs are noted. High Dose Vitamin A Counseling: Side effects reviewed, pt to contact office should one occur. Nsaids Counseling: NSAID Counseling: I discussed with the patient that NSAIDs should be taken with food. Prolonged use of NSAIDs can result in the development of stomach ulcers.  Patient advised to stop taking NSAIDs if abdominal pain occurs.  The patient verbalized understanding of the proper use and possible adverse effects of NSAIDs.  All of the patient's questions and concerns were addressed. Qbrexza Counseling:  I discussed with the patient the risks of Qbrexza including but not limited to headache, mydriasis, blurred vision, dry eyes, nasal dryness, dry mouth, dry throat, dry skin, urinary hesitation, and constipation.  Local skin reactions including erythema, burning, stinging, and itching can also occur. Soolantra Pregnancy And Lactation Text: This medication is Pregnancy Category C. This medication is considered safe during breast feeding. Ketoconazole Counseling:   Patient counseled regarding improving absorption with orange juice.  Adverse effects include but are not limited to breast enlargement, headache, diarrhea, nausea, upset stomach, liver function test abnormalities, taste disturbance, and stomach pain.  There is a rare possibility of liver failure that can occur when taking ketoconazole. The patient understands that monitoring of LFTs may be required, especially at baseline. The patient verbalized understanding of the proper use and possible adverse effects of ketoconazole.  All of the patient's questions and concerns were addressed. SSKI Counseling:  I discussed with the patient the risks of SSKI including but not limited to thyroid abnormalities, metallic taste, GI upset, fever, headache, acne, arthralgias, paraesthesias, lymphadenopathy, easy bleeding, arrhythmias, and allergic reaction. Metronidazole Counseling:  I discussed with the patient the risks of metronidazole including but not limited to seizures, nausea/vomiting, a metallic taste in the mouth, nausea/vomiting and severe allergy. Humira Counseling:  I discussed with the patient the risks of adalimumab including but not limited to myelosuppression, immunosuppression, autoimmune hepatitis, demyelinating diseases, lymphoma, and serious infections.  The patient understands that monitoring is required including a PPD at baseline and must alert us or the primary physician if symptoms of infection or other concerning signs are noted. Bactrim Pregnancy And Lactation Text: This medication is Pregnancy Category D and is known to cause fetal risk.  It is also excreted in breast milk. Arava Counseling:  Patient counseled regarding adverse effects of Arava including but not limited to nausea, vomiting, abnormalities in liver function tests. Patients may develop mouth sores, rash, diarrhea, and abnormalities in blood counts. The patient understands that monitoring is required including LFTs and blood counts.  There is a rare possibility of scarring of the liver and lung problems that can occur when taking methotrexate. Persistent nausea, loss of appetite, pale stools, dark urine, cough, and shortness of breath should be reported immediately. Patient advised to discontinue Arava treatment and consult with a physician prior to attempting conception. The patient will have to undergo a treatment to eliminate Arava from the body prior to conception. Adbry Pregnancy And Lactation Text: It is unknown if this medication will adversely affect pregnancy or breast feeding. VTAMA Counseling: I discussed with the patient that VTAMA is not for use in the eyes, mouth or mouth. They should call the office if they develop any signs of allergic reactions to VTAMA. The patient verbalized understanding of the proper use and possible adverse effects of VTAMA.  All of the patient's questions and concerns were addressed. Eucrisa Counseling: Patient may experience a mild burning sensation during topical application. Eucrisa is not approved in children less than 2 years of age. Propranolol Counseling:  I discussed with the patient the risks of propranolol including but not limited to low heart rate, low blood pressure, low blood sugar, restlessness and increased cold sensitivity. They should call the office if they experience any of these side effects. Xeljanz Counseling: I discussed with the patient the risks of Xeljanz therapy including increased risk of infection, liver issues, headache, diarrhea, or cold symptoms. Live vaccines should be avoided. They were instructed to call if they have any problems. Nsaids Pregnancy And Lactation Text: These medications are considered safe up to 30 weeks gestation. It is excreted in breast milk. Topical Metronidazole Pregnancy And Lactation Text: This medication is Pregnancy Category B and considered safe during pregnancy.  It is also considered safe to use while breastfeeding. High Dose Vitamin A Pregnancy And Lactation Text: High dose vitamin A therapy is contraindicated during pregnancy and breast feeding. Azathioprine Counseling:  I discussed with the patient the risks of azathioprine including but not limited to myelosuppression, immunosuppression, hepatotoxicity, lymphoma, and infections.  The patient understands that monitoring is required including baseline LFTs, Creatinine, possible TPMP genotyping and weekly CBCs for the first month and then every 2 weeks thereafter.  The patient verbalized understanding of the proper use and possible adverse effects of azathioprine.  All of the patient's questions and concerns were addressed. Doxepin Pregnancy And Lactation Text: This medication is Pregnancy Category C and it isn't known if it is safe during pregnancy. It is also excreted in breast milk and breast feeding isn't recommended. Topical Retinoid counseling:  Patient advised to apply a pea-sized amount only at bedtime and wait 30 minutes after washing their face before applying.  If too drying, patient may add a non-comedogenic moisturizer. The patient verbalized understanding of the proper use and possible adverse effects of retinoids.  All of the patient's questions and concerns were addressed. Tetracycline Counseling: Patient counseled regarding possible photosensitivity and increased risk for sunburn.  Patient instructed to avoid sunlight, if possible.  When exposed to sunlight, patients should wear protective clothing, sunglasses, and sunscreen.  The patient was instructed to call the office immediately if the following severe adverse effects occur:  hearing changes, easy bruising/bleeding, severe headache, or vision changes.  The patient verbalized understanding of the proper use and possible adverse effects of tetracycline.  All of the patient's questions and concerns were addressed. Patient understands to avoid pregnancy while on therapy due to potential birth defects. Mirvaso Counseling: Mirvaso is a topical medication which can decrease superficial blood flow where applied. Side effects are uncommon and include stinging, redness and allergic reactions. Ketoconazole Pregnancy And Lactation Text: This medication is Pregnancy Category C and it isn't know if it is safe during pregnancy. It is also excreted in breast milk and breast feeding isn't recommended. Glycopyrrolate Counseling:  I discussed with the patient the risks of glycopyrrolate including but not limited to skin rash, drowsiness, dry mouth, difficulty urinating, and blurred vision. Sski Pregnancy And Lactation Text: This medication is Pregnancy Category D and isn't considered safe during pregnancy. It is excreted in breast milk. Simponi Counseling:  I discussed with the patient the risks of golimumab including but not limited to myelosuppression, immunosuppression, autoimmune hepatitis, demyelinating diseases, lymphoma, and serious infections.  The patient understands that monitoring is required including a PPD at baseline and must alert us or the primary physician if symptoms of infection or other concerning signs are noted. Metronidazole Pregnancy And Lactation Text: This medication is Pregnancy Category B and considered safe during pregnancy.  It is also excreted in breast milk. Cephalexin Counseling: I counseled the patient regarding use of cephalexin as an antibiotic for prophylactic and/or therapeutic purposes. Cephalexin (commonly prescribed under brand name Keflex) is a cephalosporin antibiotic which is active against numerous classes of bacteria, including most skin bacteria. Side effects may include nausea, diarrhea, gastrointestinal upset, rash, hives, yeast infections, and in rare cases, hepatitis, kidney disease, seizures, fever, confusion, neurologic symptoms, and others. Patients with severe allergies to penicillin medications are cautioned that there is about a 10% incidence of cross-reactivity with cephalosporins. When possible, patients with penicillin allergies should use alternatives to cephalosporins for antibiotic therapy. Xellilyz Pregnancy And Lactation Text: This medication is Pregnancy Category D and is not considered safe during pregnancy.  The risk during breast feeding is also uncertain. Propranolol Pregnancy And Lactation Text: This medication is Pregnancy Category C and it isn't known if it is safe during pregnancy. It is excreted in breast milk. Dutasteride Male Counseling: Dustasteride Counseling:  I discussed with the patient the risks of use of dutasteride including but not limited to decreased libido, decreased ejaculate volume, and gynecomastia. Women who can become pregnant should not handle medication.  All of the patient's questions and concerns were addressed. Calcipotriene Counseling:  I discussed with the patient the risks of calcipotriene including but not limited to erythema, scaling, itching, and irritation. Cellcept Pregnancy And Lactation Text: This medication is Pregnancy Category D and isn't considered safe during pregnancy. It is unknown if this medication is excreted in breast milk. Olanzapine Counseling- I discussed with the patient the common side effects of olanzapine including but are not limited to: lack of energy, dry mouth, increased appetite, sleepiness, tremor, constipation, dizziness, changes in behavior, or restlessness.  Explained that teenagers are more likely to experience headaches, abdominal pain, pain in the arms or legs, tiredness, and sleepiness.  Serious side effects include but are not limited: increased risk of death in elderly patients who are confused, have memory loss, or dementia-related psychosis; hyperglycemia; increased cholesterol and triglycerides; and weight gain. Cibinqo Counseling: I discussed with the patient the risks of Cibinqo therapy including but not limited to common cold, nausea, headache, cold sores, increased blood CPK levels, dizziness, UTIs, fatigue, acne, and vomitting. Live vaccines should be avoided.  This medication has been linked to serious infections; higher rate of mortality; malignancy and lymphoproliferative disorders; major adverse cardiovascular events; thrombosis; thrombocytopenia and lymphopenia; lipid elevations; and retinal detachment. Cimzia Counseling:  I discussed with the patient the risks of Cimzia including but not limited to immunosuppression, allergic reactions and infections.  The patient understands that monitoring is required including a PPD at baseline and must alert us or the primary physician if symptoms of infection or other concerning signs are noted. Xolair Counseling:  Patient informed of potential adverse effects including but not limited to fever, muscle aches, rash and allergic reactions.  The patient verbalized understanding of the proper use and possible adverse effects of Xolair.  All of the patient's questions and concerns were addressed. Hydroxyzine Counseling: Patient advised that the medication is sedating and not to drive a car after taking this medication.  Patient informed of potential adverse effects including but not limited to dry mouth, urinary retention, and blurry vision.  The patient verbalized understanding of the proper use and possible adverse effects of hydroxyzine.  All of the patient's questions and concerns were addressed. Topical Steroids Counseling: I discussed with the patient that prolonged use of topical steroids can result in the increased appearance of superficial blood vessels (telangiectasias), lightening (hypopigmentation) and thinning of the skin (atrophy).  Patient understands to avoid using high potency steroids in skin folds, the groin or the face.  The patient verbalized understanding of the proper use and possible adverse effects of topical steroids.  All of the patient's questions and concerns were addressed.

## 2023-12-20 ENCOUNTER — APPOINTMENT (OUTPATIENT)
Dept: OPHTHALMOLOGY | Facility: CLINIC | Age: 83
End: 2023-12-20

## 2024-01-10 ENCOUNTER — NON-APPOINTMENT (OUTPATIENT)
Age: 84
End: 2024-01-10

## 2024-01-10 ENCOUNTER — APPOINTMENT (OUTPATIENT)
Dept: OPHTHALMOLOGY | Facility: CLINIC | Age: 84
End: 2024-01-10
Payer: MEDICARE

## 2024-01-10 PROCEDURE — 92012 INTRM OPH EXAM EST PATIENT: CPT

## 2024-02-12 ENCOUNTER — NON-APPOINTMENT (OUTPATIENT)
Age: 84
End: 2024-02-12

## 2024-02-12 ENCOUNTER — APPOINTMENT (OUTPATIENT)
Dept: OPHTHALMOLOGY | Facility: CLINIC | Age: 84
End: 2024-02-12
Payer: MEDICARE

## 2024-02-12 PROCEDURE — 92012 INTRM OPH EXAM EST PATIENT: CPT

## 2024-04-16 ENCOUNTER — NON-APPOINTMENT (OUTPATIENT)
Age: 84
End: 2024-04-16

## 2024-04-16 ENCOUNTER — APPOINTMENT (OUTPATIENT)
Dept: OPHTHALMOLOGY | Facility: CLINIC | Age: 84
End: 2024-04-16
Payer: MEDICARE

## 2024-04-16 PROCEDURE — 92012 INTRM OPH EXAM EST PATIENT: CPT

## 2024-05-28 ENCOUNTER — APPOINTMENT (OUTPATIENT)
Dept: OPHTHALMOLOGY | Facility: CLINIC | Age: 84
End: 2024-05-28

## 2024-06-03 NOTE — DISCHARGE NOTE PROVIDER - DISCHARGE SERVICE FOR PATIENT
Kidney function increasing since discontinuation of lasix.   Encouraged to drink plenty of water.   Continue to monitor at visit in April.    on the discharge service for the patient. I have reviewed and made amendments to the documentation where necessary.

## 2024-08-08 ENCOUNTER — APPOINTMENT (OUTPATIENT)
Dept: OPHTHALMOLOGY | Facility: CLINIC | Age: 84
End: 2024-08-08

## 2024-09-13 ENCOUNTER — NON-APPOINTMENT (OUTPATIENT)
Age: 84
End: 2024-09-13

## 2024-09-13 ENCOUNTER — APPOINTMENT (OUTPATIENT)
Dept: OPHTHALMOLOGY | Facility: CLINIC | Age: 84
End: 2024-09-13
Payer: MEDICARE

## 2024-09-13 PROCEDURE — 92012 INTRM OPH EXAM EST PATIENT: CPT

## 2024-11-22 ENCOUNTER — APPOINTMENT (OUTPATIENT)
Dept: OPHTHALMOLOGY | Facility: CLINIC | Age: 84
End: 2024-11-22

## 2024-12-13 ENCOUNTER — APPOINTMENT (OUTPATIENT)
Dept: OPHTHALMOLOGY | Facility: CLINIC | Age: 84
End: 2024-12-13

## 2024-12-25 PROBLEM — F10.90 ALCOHOL USE: Status: INACTIVE | Noted: 2018-02-16

## 2025-05-30 ENCOUNTER — APPOINTMENT (OUTPATIENT)
Dept: OPHTHALMOLOGY | Facility: CLINIC | Age: 85
End: 2025-05-30
Payer: MEDICARE

## 2025-05-30 ENCOUNTER — NON-APPOINTMENT (OUTPATIENT)
Age: 85
End: 2025-05-30

## 2025-05-30 PROCEDURE — 92014 COMPRE OPH EXAM EST PT 1/>: CPT

## 2025-07-25 ENCOUNTER — APPOINTMENT (OUTPATIENT)
Dept: OPHTHALMOLOGY | Facility: CLINIC | Age: 85
End: 2025-07-25

## 2025-08-05 ENCOUNTER — APPOINTMENT (OUTPATIENT)
Dept: OPHTHALMOLOGY | Facility: CLINIC | Age: 85
End: 2025-08-05

## 2025-08-06 ENCOUNTER — APPOINTMENT (OUTPATIENT)
Dept: OPHTHALMOLOGY | Facility: CLINIC | Age: 85
End: 2025-08-06
Payer: MEDICARE

## 2025-08-06 ENCOUNTER — NON-APPOINTMENT (OUTPATIENT)
Age: 85
End: 2025-08-06

## 2025-08-06 PROCEDURE — 92014 COMPRE OPH EXAM EST PT 1/>: CPT

## 2025-08-06 PROCEDURE — 92250 FUNDUS PHOTOGRAPHY W/I&R: CPT
